# Patient Record
Sex: MALE | Race: WHITE | Employment: OTHER | URBAN - NONMETROPOLITAN AREA
[De-identification: names, ages, dates, MRNs, and addresses within clinical notes are randomized per-mention and may not be internally consistent; named-entity substitution may affect disease eponyms.]

---

## 2021-09-03 ENCOUNTER — APPOINTMENT (OUTPATIENT)
Dept: GENERAL RADIOLOGY | Age: 76
End: 2021-09-03
Payer: MEDICARE

## 2021-09-03 ENCOUNTER — HOSPITAL ENCOUNTER (EMERGENCY)
Age: 76
Discharge: HOME | End: 2021-09-03
Attending: EMERGENCY MEDICINE
Payer: MEDICARE

## 2021-09-03 VITALS
SYSTOLIC BLOOD PRESSURE: 126 MMHG | HEART RATE: 90 BPM | TEMPERATURE: 97.8 F | DIASTOLIC BLOOD PRESSURE: 83 MMHG | HEIGHT: 71 IN | BODY MASS INDEX: 20.66 KG/M2 | OXYGEN SATURATION: 92 % | WEIGHT: 147.6 LBS | RESPIRATION RATE: 18 BRPM
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
## 2021-09-03 DIAGNOSIS — S16.1XXA ACUTE STRAIN OF NECK MUSCLE, INITIAL ENCOUNTER: ICD-10-CM

## 2021-09-03 DIAGNOSIS — S29.019A THORACIC MYOFASCIAL STRAIN, INITIAL ENCOUNTER: ICD-10-CM

## 2021-09-03 PROCEDURE — 72110 X-RAY EXAM L-2 SPINE 4/>VWS: CPT

## 2021-09-03 PROCEDURE — 72072 X-RAY EXAM THORAC SPINE 3VWS: CPT

## 2021-09-03 PROCEDURE — 99283 EMERGENCY DEPT VISIT LOW MDM: CPT

## 2021-09-03 PROCEDURE — 72050 X-RAY EXAM NECK SPINE 4/5VWS: CPT

## 2021-09-03 PROCEDURE — 71045 X-RAY EXAM CHEST 1 VIEW: CPT

## 2021-09-03 RX ORDER — NAPROXEN 375 MG/1
375 TABLET ORAL 2 TIMES DAILY WITH MEALS
Qty: 20 TABLET | Refills: 0 | Status: SHIPPED | OUTPATIENT
Start: 2021-09-03

## 2021-09-03 ASSESSMENT — PAIN DESCRIPTION - PAIN TYPE: TYPE: ACUTE PAIN

## 2021-09-03 ASSESSMENT — PAIN DESCRIPTION - FREQUENCY: FREQUENCY: CONTINUOUS

## 2021-09-03 ASSESSMENT — PAIN DESCRIPTION - LOCATION: LOCATION: BACK;CHEST;NECK

## 2021-09-03 ASSESSMENT — PAIN DESCRIPTION - DESCRIPTORS: DESCRIPTORS: ACHING

## 2021-09-03 ASSESSMENT — PAIN SCALES - GENERAL: PAINLEVEL_OUTOF10: 7
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
## 2022-01-09 ENCOUNTER — APPOINTMENT (OUTPATIENT)
Dept: GENERAL RADIOLOGY | Age: 77
DRG: 190 | End: 2022-01-09
Payer: MEDICARE

## 2022-01-09 ENCOUNTER — HOSPITAL ENCOUNTER (INPATIENT)
Age: 77
LOS: 4 days | Discharge: HOME | DRG: 190 | End: 2022-01-14
Attending: EMERGENCY MEDICINE | Admitting: INTERNAL MEDICINE
Payer: MEDICARE

## 2022-01-09 DIAGNOSIS — R91.8 PULMONARY NODULES: ICD-10-CM

## 2022-01-09 DIAGNOSIS — J44.1 COPD EXACERBATION (HCC): Primary | ICD-10-CM

## 2022-01-09 DIAGNOSIS — R09.02 HYPOXIA: ICD-10-CM

## 2022-01-09 LAB
ABSOLUTE EOS #: 0 K/UL (ref 0–0.4)
ABSOLUTE IMMATURE GRANULOCYTE: ABNORMAL K/UL (ref 0–0.3)
ABSOLUTE LYMPH #: 0.7 K/UL (ref 1–4.8)
ABSOLUTE MONO #: 1.7 K/UL (ref 0–1)
ALBUMIN SERPL-MCNC: 3.9 G/DL (ref 3.5–5.2)
ALBUMIN/GLOBULIN RATIO: ABNORMAL (ref 1–2.5)
ALLEN TEST: ABNORMAL
ALP BLD-CCNC: 106 U/L (ref 40–129)
ALT SERPL-CCNC: 6 U/L (ref 5–41)
ANION GAP SERPL CALCULATED.3IONS-SCNC: 14 MMOL/L (ref 9–17)
AST SERPL-CCNC: 7 U/L
BASOPHILS # BLD: 0 % (ref 0–2)
BASOPHILS ABSOLUTE: 0 K/UL (ref 0–0.2)
BILIRUB SERPL-MCNC: 0.41 MG/DL (ref 0.3–1.2)
BUN BLDV-MCNC: 26 MG/DL (ref 8–23)
BUN/CREAT BLD: ABNORMAL (ref 9–20)
C-REACTIVE PROTEIN: 323.1 MG/L (ref 0–5)
CALCIUM SERPL-MCNC: 9.6 MG/DL (ref 8.6–10.4)
CHLORIDE BLD-SCNC: 101 MMOL/L (ref 98–107)
CO2: 25 MMOL/L (ref 20–31)
CREAT SERPL-MCNC: 0.59 MG/DL (ref 0.7–1.2)
DIFFERENTIAL TYPE: YES
EOSINOPHILS RELATIVE PERCENT: 0 % (ref 0–5)
FERRITIN: 307 UG/L (ref 30–400)
FIO2: 35
FIO2: 35
FIO2: 40
GFR AFRICAN AMERICAN: >60 ML/MIN
GFR NON-AFRICAN AMERICAN: >60 ML/MIN
GFR SERPL CREATININE-BSD FRML MDRD: ABNORMAL ML/MIN/{1.73_M2}
GFR SERPL CREATININE-BSD FRML MDRD: ABNORMAL ML/MIN/{1.73_M2}
GLUCOSE BLD-MCNC: 164 MG/DL (ref 70–99)
HCT VFR BLD CALC: 46.3 % (ref 41–53)
HEMOGLOBIN: 14.9 G/DL (ref 13.5–17.5)
IMMATURE GRANULOCYTES: ABNORMAL %
LACTATE DEHYDROGENASE: 136 U/L (ref 135–225)
LACTIC ACID, SEPSIS WHOLE BLOOD: NORMAL MMOL/L (ref 0.5–1.9)
LACTIC ACID, SEPSIS: 1.1 MMOL/L (ref 0.5–1.9)
LYMPHOCYTES # BLD: 4 % (ref 13–44)
MCH RBC QN AUTO: 26.5 PG (ref 26–34)
MCHC RBC AUTO-ENTMCNC: 32.1 G/DL (ref 31–37)
MCV RBC AUTO: 82.5 FL (ref 80–100)
MODE: ABNORMAL
MONOCYTES # BLD: 11 % (ref 5–9)
NEGATIVE BASE EXCESS, ART: 2 (ref 0–2)
NEGATIVE BASE EXCESS, ART: ABNORMAL (ref 0–2)
NEGATIVE BASE EXCESS, ART: ABNORMAL (ref 0–2)
NRBC AUTOMATED: ABNORMAL PER 100 WBC
O2 DEVICE/FLOW/%: ABNORMAL
PATIENT TEMP: ABNORMAL
PDW BLD-RTO: 15.9 % (ref 12.1–15.2)
PLATELET # BLD: 310 K/UL (ref 140–450)
PLATELET ESTIMATE: ABNORMAL
PMV BLD AUTO: ABNORMAL FL (ref 6–12)
POC HCO3: 28 MMOL/L (ref 21–28)
POC HCO3: 32.1 MMOL/L (ref 21–28)
POC HCO3: 32.6 MMOL/L (ref 21–28)
POC O2 SATURATION: 88 % (ref 94–98)
POC O2 SATURATION: 91 % (ref 94–98)
POC O2 SATURATION: 96 % (ref 94–98)
POC PCO2 TEMP: ABNORMAL MM HG
POC PCO2: 69.2 MM HG (ref 35–48)
POC PCO2: 73.2 MM HG (ref 35–48)
POC PCO2: 85.5 MM HG (ref 35–48)
POC PH TEMP: ABNORMAL
POC PH: 7.18 (ref 7.35–7.45)
POC PH: 7.21 (ref 7.35–7.45)
POC PH: 7.26 (ref 7.35–7.45)
POC PO2 TEMP: ABNORMAL MM HG
POC PO2: 103.3 MM HG (ref 83–108)
POC PO2: 70.4 MM HG (ref 83–108)
POC PO2: 74.3 MM HG (ref 83–108)
POSITIVE BASE EXCESS, ART: 0 (ref 0–3)
POSITIVE BASE EXCESS, ART: 2 (ref 0–3)
POSITIVE BASE EXCESS, ART: ABNORMAL (ref 0–3)
POTASSIUM SERPL-SCNC: 4.2 MMOL/L (ref 3.7–5.3)
RBC # BLD: 5.61 M/UL (ref 4.5–5.9)
RBC # BLD: ABNORMAL 10*6/UL
SAMPLE SITE: ABNORMAL
SARS-COV-2, RAPID: NOT DETECTED
SEG NEUTROPHILS: 85 % (ref 39–75)
SEGMENTED NEUTROPHILS ABSOLUTE COUNT: 13.1 K/UL (ref 2.1–6.5)
SODIUM BLD-SCNC: 140 MMOL/L (ref 135–144)
SPECIMEN DESCRIPTION: NORMAL
TCO2 (CALC), ART: ABNORMAL MMOL/L (ref 22–29)
TOTAL PROTEIN: 8.3 G/DL (ref 6.4–8.3)
TROPONIN INTERP: NORMAL
TROPONIN T: NORMAL NG/ML
TROPONIN, HIGH SENSITIVITY: 20 NG/L (ref 0–22)
WBC # BLD: 15.6 K/UL (ref 3.5–11)
WBC # BLD: ABNORMAL 10*3/UL

## 2022-01-09 PROCEDURE — 82728 ASSAY OF FERRITIN: CPT

## 2022-01-09 PROCEDURE — 2580000003 HC RX 258: Performed by: EMERGENCY MEDICINE

## 2022-01-09 PROCEDURE — 86140 C-REACTIVE PROTEIN: CPT

## 2022-01-09 PROCEDURE — 96367 TX/PROPH/DG ADDL SEQ IV INF: CPT

## 2022-01-09 PROCEDURE — C9803 HOPD COVID-19 SPEC COLLECT: HCPCS

## 2022-01-09 PROCEDURE — 84484 ASSAY OF TROPONIN QUANT: CPT

## 2022-01-09 PROCEDURE — 83605 ASSAY OF LACTIC ACID: CPT

## 2022-01-09 PROCEDURE — 96375 TX/PRO/DX INJ NEW DRUG ADDON: CPT

## 2022-01-09 PROCEDURE — 93005 ELECTROCARDIOGRAM TRACING: CPT | Performed by: EMERGENCY MEDICINE

## 2022-01-09 PROCEDURE — 36415 COLL VENOUS BLD VENIPUNCTURE: CPT

## 2022-01-09 PROCEDURE — 96365 THER/PROPH/DIAG IV INF INIT: CPT

## 2022-01-09 PROCEDURE — 6370000000 HC RX 637 (ALT 250 FOR IP): Performed by: EMERGENCY MEDICINE

## 2022-01-09 PROCEDURE — 83615 LACTATE (LD) (LDH) ENZYME: CPT

## 2022-01-09 PROCEDURE — 85025 COMPLETE CBC W/AUTO DIFF WBC: CPT

## 2022-01-09 PROCEDURE — 36600 WITHDRAWAL OF ARTERIAL BLOOD: CPT

## 2022-01-09 PROCEDURE — 87635 SARS-COV-2 COVID-19 AMP PRB: CPT

## 2022-01-09 PROCEDURE — 94640 AIRWAY INHALATION TREATMENT: CPT

## 2022-01-09 PROCEDURE — 87040 BLOOD CULTURE FOR BACTERIA: CPT

## 2022-01-09 PROCEDURE — 94660 CPAP INITIATION&MGMT: CPT

## 2022-01-09 PROCEDURE — 82803 BLOOD GASES ANY COMBINATION: CPT

## 2022-01-09 PROCEDURE — 71045 X-RAY EXAM CHEST 1 VIEW: CPT

## 2022-01-09 PROCEDURE — 6360000002 HC RX W HCPCS: Performed by: EMERGENCY MEDICINE

## 2022-01-09 PROCEDURE — 80053 COMPREHEN METABOLIC PANEL: CPT

## 2022-01-09 RX ORDER — IPRATROPIUM BROMIDE AND ALBUTEROL SULFATE 2.5; .5 MG/3ML; MG/3ML
1 SOLUTION RESPIRATORY (INHALATION) ONCE
Status: COMPLETED | OUTPATIENT
Start: 2022-01-09 | End: 2022-01-09

## 2022-01-09 RX ORDER — 0.9 % SODIUM CHLORIDE 0.9 %
500 INTRAVENOUS SOLUTION INTRAVENOUS ONCE
Status: COMPLETED | OUTPATIENT
Start: 2022-01-09 | End: 2022-01-09

## 2022-01-09 RX ORDER — METHYLPREDNISOLONE SODIUM SUCCINATE 125 MG/2ML
125 INJECTION, POWDER, LYOPHILIZED, FOR SOLUTION INTRAMUSCULAR; INTRAVENOUS ONCE
Status: COMPLETED | OUTPATIENT
Start: 2022-01-09 | End: 2022-01-09

## 2022-01-09 RX ADMIN — CEFTRIAXONE SODIUM 1000 MG: 1 INJECTION, POWDER, FOR SOLUTION INTRAMUSCULAR; INTRAVENOUS at 14:51

## 2022-01-09 RX ADMIN — IPRATROPIUM BROMIDE AND ALBUTEROL SULFATE 1 AMPULE: .5; 3 SOLUTION RESPIRATORY (INHALATION) at 14:36

## 2022-01-09 RX ADMIN — METHYLPREDNISOLONE SODIUM SUCCINATE 125 MG: 125 INJECTION, POWDER, FOR SOLUTION INTRAMUSCULAR; INTRAVENOUS at 18:41

## 2022-01-09 RX ADMIN — IPRATROPIUM BROMIDE AND ALBUTEROL SULFATE 1 AMPULE: .5; 3 SOLUTION RESPIRATORY (INHALATION) at 19:41

## 2022-01-09 RX ADMIN — AZITHROMYCIN MONOHYDRATE 500 MG: 500 INJECTION, POWDER, LYOPHILIZED, FOR SOLUTION INTRAVENOUS at 15:22

## 2022-01-09 RX ADMIN — SODIUM CHLORIDE 500 ML: 9 INJECTION, SOLUTION INTRAVENOUS at 13:22

## 2022-01-09 ASSESSMENT — ENCOUNTER SYMPTOMS
VOMITING: 0
ABDOMINAL PAIN: 0
NAUSEA: 0
EYE REDNESS: 0
DIARRHEA: 0
SHORTNESS OF BREATH: 1
TROUBLE SWALLOWING: 0
SORE THROAT: 0
BACK PAIN: 0
EYE PAIN: 0
COLOR CHANGE: 0
COUGH: 0

## 2022-01-09 NOTE — ED PROVIDER NOTES
family history. SOCIAL HISTORY       Social History     Socioeconomic History    Marital status:      Spouse name: None    Number of children: None    Years of education: None    Highest education level: None   Occupational History    None   Tobacco Use    Smoking status: Current Every Day Smoker     Packs/day: 1.00    Smokeless tobacco: Never Used   Vaping Use    Vaping Use: Never used   Substance and Sexual Activity    Alcohol use: None    Drug use: Never    Sexual activity: None   Other Topics Concern    None   Social History Narrative    None     Social Determinants of Health     Financial Resource Strain:     Difficulty of Paying Living Expenses: Not on file   Food Insecurity:     Worried About Running Out of Food in the Last Year: Not on file    Lesly of Food in the Last Year: Not on file   Transportation Needs:     Lack of Transportation (Medical): Not on file    Lack of Transportation (Non-Medical):  Not on file   Physical Activity:     Days of Exercise per Week: Not on file    Minutes of Exercise per Session: Not on file   Stress:     Feeling of Stress : Not on file   Social Connections:     Frequency of Communication with Friends and Family: Not on file    Frequency of Social Gatherings with Friends and Family: Not on file    Attends Orthodox Services: Not on file    Active Member of [address] [place] [address] or Organizations: Not on file    Attends Club or Organization Meetings: Not on file    Marital Status: Not on file   Intimate Partner Violence:     Fear of Current or Ex-Partner: Not on file    Emotionally Abused: Not on file    Physically Abused: Not on file    Sexually Abused: Not on file   Housing Stability:     Unable to Pay for Housing in the Last Year: Not on file    Number of [place] in the Last Year: Not on file    Unstable Housing in the Last Year: Not on file           PHYSICAL EXAM    (up to 7 for level 4, 8 ormore for level 5)     ED Triage Vitals   BP Temp Temp Source Pulse Resp SpO2 Height Weight   01/09/22 1306 01/09/22 1307 01/09/22 1307 01/09/22 1306 01/09/22 1307 01/09/22 1305 -- --   (!) 148/72 98.7 °F (37.1 °C) Oral 110 28 (!) 61 %         Physical Exam     Physical    Vital signs and nursing notes were reviewed as well as the social, family, and past medical history. Gen. appearance: Patient is alert and oriented and in no acute distress    Head: Atraumatic, normocephalic    Neck: Supple, trachea/thyroid normal    EENT: PERRLA, EOMI, conjunctiva normal.    Skin: Warm and dry with no rash    Cardiovascular: Heart RRR, no gallops or rubs, no aortic enlargement or bruits noted. Respiratory: Lungs diminished, minimal wheezing, no rales,     Gastrointestinal: Abdomen nontender, bowel sounds normal, no rebound/guarding/distention or mass    Musculoskeletal: No tenderness in the extremities, no back or hip pain. Neurological: Patient is alert and oriented ×3, no focal motor or sensory deficits noted, reflexes normal, NIH = 0      DIAGNOSTIC RESULTS     EKG: All EKG's are interpreted by the Emergency Department Physicianwho either signs or Co-signs this chart in the absence of a cardiologist.    EKG shows a heart rate of 114 with normal sinus rhythm and no acute ischemic change.     RADIOLOGY:         LABS:  Labs Reviewed   CBC WITH AUTO DIFFERENTIAL - Abnormal; Notable for the following components:       Result Value    WBC 15.6 (*)     RDW 15.9 (*)     Seg Neutrophils 85 (*)     Lymphocytes 4 (*)     Monocytes 11 (*)     Segs Absolute 13.10 (*)     Absolute Lymph # 0.70 (*)     Absolute Mono # 1.70 (*)     All other components within normal limits   COMPREHENSIVE METABOLIC PANEL - Abnormal; Notable for the following components:    Glucose 164 (*)     BUN 26 (*)     CREATININE 0.59 (*)     All other components within normal limits   C-REACTIVE PROTEIN - Abnormal; Notable for the following components:    .1 (*)     All other components within normal limits   ARTERIAL BLOOD GAS, POC - Abnormal; Notable for the following components:    POC pH 7.215 (*)     POC pCO2 69.2 (*)     All other components within normal limits   ARTERIAL BLOOD GAS, POC - Abnormal; Notable for the following components:    POC pH 7.182 (*)     POC pCO2 85.5 (*)     POC PO2 70.4 (*)     POC HCO3 32.1 (*)     POC O2 SAT 88 (*)     All other components within normal limits   ARTERIAL BLOOD GAS, POC - Abnormal; Notable for the following components:    POC pH 7.257 (*)     POC pCO2 73.2 (*)     POC PO2 74.3 (*)     POC HCO3 32.6 (*)     POC O2 SAT 91 (*)     All other components within normal limits   ARTERIAL BLOOD GAS, POC - Abnormal; Notable for the following components:    POC pH 7.304 (*)     POC pCO2 61.6 (*)     POC PO2 67.3 (*)     POC HCO3 30.6 (*)     POC O2 SAT 90 (*)     All other components within normal limits   ARTERIAL BLOOD GAS, POC - Abnormal; Notable for the following components:    POC pH 7.310 (*)     POC pCO2 62.8 (*)     POC PO2 62.8 (*)     POC HCO3 31.6 (*)     POC O2 SAT 89 (*)     All other components within normal limits   COVID-19, RAPID   CULTURE, BLOOD 1   CULTURE, BLOOD 1   TROPONIN   LACTATE DEHYDROGENASE   FERRITIN   LACTATE, SEPSIS       All other labs were within normal range or not returned as of this dictation. EMERGENCY DEPARTMENT COURSE and DIFFERENTIAL DIAGNOSIS/MDM:   Vitals:    Vitals:    01/10/22 0530 01/10/22 0600 01/10/22 0630 01/10/22 0701   BP:  120/69  127/75   Pulse: 75 70 73 71   Resp: 18 16 17 15   Temp:       TempSrc:       SpO2: 92%  94% 93%   Weight:       Height:                     REASSESSMENT      Here in the ED given patient's initial O2 of 69 patient was placed on BiPAP. After an hour and a half a repeat blood gas was obtained and patient's pH is actually more acidic at 7.182 and a PCO2 of 85 is noted. At this time patient's settings were adjusted and we will check again in 30 minutes for improvement versus deterioration. Patient is still awake and breathing comfortably with the BiPAP. Patient's pH and PCO2 improved with adjustments and patient seems to be doing much better. Patient was given IV steroids and has been given multiple nebulized treatments and is tolerating the BiPAP mask. At this point there is no bed for admission. I did call [facility] and spoke to their intensivist and patient was accepted in transfer however there are no beds and patient is on the wait list.  Patient will be continued to be managed in the ED and reevaluated. Patient will be signed out to the oncoming provider at 7 AM.                  Due to the immediate potential for life-threatening deterioration due to hypoxia and respiratory distress secondary to COPD during which time patient was placed on BiPAP and given albuterol treatments and steroids, I spent 80 minutes providing critical care. This time is excluding time spent performing procedures. PROCEDURES:  Unless otherwise noted below, none     Procedures    FINAL IMPRESSION      1. COPD exacerbation ([address]    2. Hypoxia          DISPOSITION/PLAN   DISPOSITION        PATIENT REFERRED TO:  No follow-up provider specified.     DISCHARGE MEDICATIONS:  New Prescriptions    No medications on file          (Please note that portions ofthis note were completed with a voice recognition program.  Efforts were made to edit the dictations but occasionally words are mis-transcribed.)    Jaimee Reaves MD(electronically signed)  Attending Emergency Physician            Jaimee Reaves MD  01/10/22 0394

## 2022-01-09 NOTE — CARE COORDINATION
Call received from transfer center for admission to [facility] for evaluation and treatment of acute hypercapnic/hypoxic respiratory failure. Pt has history of COPD  and is now requiring Bipap. AB.//32. Plan:     1. Restart appropriate home medications  2. Oxygen supplementation with goal SPO2 88-92%  3. Continue Solu-medrol 40 Q8  4. Continue Duo-neb's, start guaifenesin, acapella. 5. Continue Rocephin/Azithromycin  6. Continue BiPAP  7.  Plan for admission to Step-down when pt arrives

## 2022-01-10 ENCOUNTER — APPOINTMENT (OUTPATIENT)
Dept: CT IMAGING | Age: 77
DRG: 190 | End: 2022-01-10
Payer: MEDICARE

## 2022-01-10 LAB
ALLEN TEST: POSITIVE
ALLEN TEST: POSITIVE
EKG ATRIAL RATE: 114 BPM
EKG P AXIS: 73 DEGREES
EKG P-R INTERVAL: 168 MS
EKG Q-T INTERVAL: 338 MS
EKG QRS DURATION: 108 MS
EKG QTC CALCULATION (BAZETT): 465 MS
EKG R AXIS: 97 DEGREES
EKG T AXIS: 59 DEGREES
EKG VENTRICULAR RATE: 114 BPM
FIO2: 34
FIO2: 34
GLUCOSE BLD-MCNC: 217 MG/DL (ref 65–99)
GLUCOSE BLD-MCNC: 217 MG/DL (ref 65–99)
MODE: ABNORMAL
MODE: ABNORMAL
NEGATIVE BASE EXCESS, ART: ABNORMAL (ref 0–2)
NEGATIVE BASE EXCESS, ART: ABNORMAL (ref 0–2)
O2 DEVICE/FLOW/%: ABNORMAL
O2 DEVICE/FLOW/%: ABNORMAL
PATIENT TEMP: ABNORMAL
PATIENT TEMP: ABNORMAL
POC HCO3: 30.6 MMOL/L (ref 21–28)
POC HCO3: 31.6 MMOL/L (ref 21–28)
POC O2 SATURATION: 89 % (ref 94–98)
POC O2 SATURATION: 90 % (ref 94–98)
POC PCO2 TEMP: ABNORMAL MM HG
POC PCO2 TEMP: ABNORMAL MM HG
POC PCO2: 61.6 MM HG (ref 35–48)
POC PCO2: 62.8 MM HG (ref 35–48)
POC PH TEMP: ABNORMAL
POC PH TEMP: ABNORMAL
POC PH: 7.3 (ref 7.35–7.45)
POC PH: 7.31 (ref 7.35–7.45)
POC PO2 TEMP: ABNORMAL MM HG
POC PO2 TEMP: ABNORMAL MM HG
POC PO2: 62.8 MM HG (ref 83–108)
POC PO2: 67.3 MM HG (ref 83–108)
POSITIVE BASE EXCESS, ART: 2 (ref 0–3)
POSITIVE BASE EXCESS, ART: 3 (ref 0–3)
SAMPLE SITE: ABNORMAL
SAMPLE SITE: ABNORMAL
TCO2 (CALC), ART: ABNORMAL MMOL/L (ref 22–29)
TCO2 (CALC), ART: ABNORMAL MMOL/L (ref 22–29)

## 2022-01-10 PROCEDURE — 6370000000 HC RX 637 (ALT 250 FOR IP): Performed by: INTERNAL MEDICINE

## 2022-01-10 PROCEDURE — 2580000003 HC RX 258: Performed by: INTERNAL MEDICINE

## 2022-01-10 PROCEDURE — 96375 TX/PRO/DX INJ NEW DRUG ADDON: CPT

## 2022-01-10 PROCEDURE — 94640 AIRWAY INHALATION TREATMENT: CPT

## 2022-01-10 PROCEDURE — 6360000002 HC RX W HCPCS: Performed by: INTERNAL MEDICINE

## 2022-01-10 PROCEDURE — 2700000000 HC OXYGEN THERAPY PER DAY

## 2022-01-10 PROCEDURE — 83036 HEMOGLOBIN GLYCOSYLATED A1C: CPT

## 2022-01-10 PROCEDURE — 6370000000 HC RX 637 (ALT 250 FOR IP): Performed by: EMERGENCY MEDICINE

## 2022-01-10 PROCEDURE — 94761 N-INVAS EAR/PLS OXIMETRY MLT: CPT

## 2022-01-10 PROCEDURE — 82947 ASSAY GLUCOSE BLOOD QUANT: CPT

## 2022-01-10 PROCEDURE — 82803 BLOOD GASES ANY COMBINATION: CPT

## 2022-01-10 PROCEDURE — 99285 EMERGENCY DEPT VISIT HI MDM: CPT

## 2022-01-10 PROCEDURE — 93010 ELECTROCARDIOGRAM REPORT: CPT | Performed by: INTERNAL MEDICINE

## 2022-01-10 PROCEDURE — 6360000002 HC RX W HCPCS: Performed by: EMERGENCY MEDICINE

## 2022-01-10 PROCEDURE — 1200000000 HC SEMI PRIVATE

## 2022-01-10 PROCEDURE — 71250 CT THORAX DX C-: CPT

## 2022-01-10 PROCEDURE — 94660 CPAP INITIATION&MGMT: CPT

## 2022-01-10 PROCEDURE — 36600 WITHDRAWAL OF ARTERIAL BLOOD: CPT

## 2022-01-10 PROCEDURE — 94669 MECHANICAL CHEST WALL OSCILL: CPT

## 2022-01-10 RX ORDER — IPRATROPIUM BROMIDE AND ALBUTEROL SULFATE 2.5; .5 MG/3ML; MG/3ML
1 SOLUTION RESPIRATORY (INHALATION) ONCE
Status: COMPLETED | OUTPATIENT
Start: 2022-01-10 | End: 2022-01-10

## 2022-01-10 RX ORDER — ALBUTEROL SULFATE 2.5 MG/3ML
2.5 SOLUTION RESPIRATORY (INHALATION)
Status: DISCONTINUED | OUTPATIENT
Start: 2022-01-10 | End: 2022-01-14 | Stop reason: HOSPADM

## 2022-01-10 RX ORDER — LORAZEPAM 2 MG/ML
0.5 INJECTION INTRAMUSCULAR ONCE
Status: COMPLETED | OUTPATIENT
Start: 2022-01-10 | End: 2022-01-10

## 2022-01-10 RX ORDER — ONDANSETRON 2 MG/ML
4 INJECTION INTRAMUSCULAR; INTRAVENOUS EVERY 6 HOURS PRN
Status: DISCONTINUED | OUTPATIENT
Start: 2022-01-10 | End: 2022-01-14 | Stop reason: HOSPADM

## 2022-01-10 RX ORDER — ONDANSETRON 4 MG/1
4 TABLET, ORALLY DISINTEGRATING ORAL EVERY 8 HOURS PRN
Status: DISCONTINUED | OUTPATIENT
Start: 2022-01-10 | End: 2022-01-14 | Stop reason: HOSPADM

## 2022-01-10 RX ORDER — NICOTINE POLACRILEX 4 MG
15 LOZENGE BUCCAL PRN
Status: DISCONTINUED | OUTPATIENT
Start: 2022-01-10 | End: 2022-01-14 | Stop reason: HOSPADM

## 2022-01-10 RX ORDER — GUAIFENESIN 600 MG/1
600 TABLET, EXTENDED RELEASE ORAL 2 TIMES DAILY
Status: DISCONTINUED | OUTPATIENT
Start: 2022-01-10 | End: 2022-01-14 | Stop reason: HOSPADM

## 2022-01-10 RX ORDER — IPRATROPIUM BROMIDE AND ALBUTEROL SULFATE 2.5; .5 MG/3ML; MG/3ML
1 SOLUTION RESPIRATORY (INHALATION)
Status: DISCONTINUED | OUTPATIENT
Start: 2022-01-10 | End: 2022-01-13

## 2022-01-10 RX ORDER — ACETAMINOPHEN 650 MG/1
650 SUPPOSITORY RECTAL EVERY 6 HOURS PRN
Status: DISCONTINUED | OUTPATIENT
Start: 2022-01-10 | End: 2022-01-14 | Stop reason: HOSPADM

## 2022-01-10 RX ORDER — HYDROCODONE BITARTRATE AND ACETAMINOPHEN 5; 325 MG/1; MG/1
1 TABLET ORAL ONCE
Status: COMPLETED | OUTPATIENT
Start: 2022-01-10 | End: 2022-01-10

## 2022-01-10 RX ORDER — DEXTROSE MONOHYDRATE 50 MG/ML
100 INJECTION, SOLUTION INTRAVENOUS PRN
Status: DISCONTINUED | OUTPATIENT
Start: 2022-01-10 | End: 2022-01-14 | Stop reason: HOSPADM

## 2022-01-10 RX ORDER — ACETAMINOPHEN 325 MG/1
650 TABLET ORAL EVERY 6 HOURS PRN
Status: DISCONTINUED | OUTPATIENT
Start: 2022-01-10 | End: 2022-01-14 | Stop reason: HOSPADM

## 2022-01-10 RX ORDER — PREDNISONE 20 MG/1
40 TABLET ORAL DAILY
Status: DISCONTINUED | OUTPATIENT
Start: 2022-01-13 | End: 2022-01-14 | Stop reason: HOSPADM

## 2022-01-10 RX ORDER — DEXTROSE MONOHYDRATE 25 G/50ML
12.5 INJECTION, SOLUTION INTRAVENOUS PRN
Status: DISCONTINUED | OUTPATIENT
Start: 2022-01-10 | End: 2022-01-14 | Stop reason: HOSPADM

## 2022-01-10 RX ORDER — LACTOBACILLUS RHAMNOSUS GG 10B CELL
1 CAPSULE ORAL 2 TIMES DAILY WITH MEALS
Status: DISCONTINUED | OUTPATIENT
Start: 2022-01-10 | End: 2022-01-14 | Stop reason: HOSPADM

## 2022-01-10 RX ORDER — BUDESONIDE AND FORMOTEROL FUMARATE DIHYDRATE 160; 4.5 UG/1; UG/1
2 AEROSOL RESPIRATORY (INHALATION) 2 TIMES DAILY
Status: DISCONTINUED | OUTPATIENT
Start: 2022-01-10 | End: 2022-01-14 | Stop reason: HOSPADM

## 2022-01-10 RX ORDER — SODIUM CHLORIDE 9 MG/ML
25 INJECTION, SOLUTION INTRAVENOUS PRN
Status: DISCONTINUED | OUTPATIENT
Start: 2022-01-10 | End: 2022-01-14 | Stop reason: HOSPADM

## 2022-01-10 RX ORDER — SODIUM CHLORIDE 0.9 % (FLUSH) 0.9 %
5-40 SYRINGE (ML) INJECTION EVERY 12 HOURS SCHEDULED
Status: DISCONTINUED | OUTPATIENT
Start: 2022-01-10 | End: 2022-01-14 | Stop reason: HOSPADM

## 2022-01-10 RX ORDER — SODIUM CHLORIDE 0.9 % (FLUSH) 0.9 %
5-40 SYRINGE (ML) INJECTION PRN
Status: DISCONTINUED | OUTPATIENT
Start: 2022-01-10 | End: 2022-01-14 | Stop reason: HOSPADM

## 2022-01-10 RX ORDER — POLYETHYLENE GLYCOL 3350 17 G/17G
17 POWDER, FOR SOLUTION ORAL DAILY PRN
Status: DISCONTINUED | OUTPATIENT
Start: 2022-01-10 | End: 2022-01-14 | Stop reason: HOSPADM

## 2022-01-10 RX ORDER — METHYLPREDNISOLONE SODIUM SUCCINATE 40 MG/ML
40 INJECTION, POWDER, LYOPHILIZED, FOR SOLUTION INTRAMUSCULAR; INTRAVENOUS EVERY 8 HOURS
Status: COMPLETED | OUTPATIENT
Start: 2022-01-10 | End: 2022-01-12

## 2022-01-10 RX ADMIN — METHYLPREDNISOLONE SODIUM SUCCINATE 40 MG: 40 INJECTION, POWDER, FOR SOLUTION INTRAMUSCULAR; INTRAVENOUS at 18:14

## 2022-01-10 RX ADMIN — GUAIFENESIN 600 MG: 600 TABLET, EXTENDED RELEASE ORAL at 21:43

## 2022-01-10 RX ADMIN — IPRATROPIUM BROMIDE AND ALBUTEROL SULFATE 1 AMPULE: .5; 3 SOLUTION RESPIRATORY (INHALATION) at 19:48

## 2022-01-10 RX ADMIN — LORAZEPAM 0.5 MG: 2 INJECTION, SOLUTION INTRAMUSCULAR; INTRAVENOUS at 01:30

## 2022-01-10 RX ADMIN — CEFTRIAXONE SODIUM 1000 MG: 1 INJECTION, POWDER, FOR SOLUTION INTRAMUSCULAR; INTRAVENOUS at 18:14

## 2022-01-10 RX ADMIN — SODIUM CHLORIDE, PRESERVATIVE FREE 10 ML: 5 INJECTION INTRAVENOUS at 21:46

## 2022-01-10 RX ADMIN — Medication 1 CAPSULE: at 18:19

## 2022-01-10 RX ADMIN — INSULIN LISPRO 1 UNITS: 100 INJECTION, SOLUTION INTRAVENOUS; SUBCUTANEOUS at 21:43

## 2022-01-10 RX ADMIN — HYDROCODONE BITARTRATE AND ACETAMINOPHEN 1 TABLET: 5; 325 TABLET ORAL at 11:53

## 2022-01-10 RX ADMIN — ENOXAPARIN SODIUM 40 MG: 100 INJECTION SUBCUTANEOUS at 18:19

## 2022-01-10 RX ADMIN — BUDESONIDE AND FORMOTEROL FUMARATE DIHYDRATE 2 PUFF: 160; 4.5 AEROSOL RESPIRATORY (INHALATION) at 19:48

## 2022-01-10 RX ADMIN — AZITHROMYCIN MONOHYDRATE 500 MG: 500 INJECTION, POWDER, LYOPHILIZED, FOR SOLUTION INTRAVENOUS at 18:42

## 2022-01-10 RX ADMIN — IPRATROPIUM BROMIDE AND ALBUTEROL SULFATE 1 AMPULE: .5; 3 SOLUTION RESPIRATORY (INHALATION) at 01:42

## 2022-01-10 ASSESSMENT — PAIN SCALES - GENERAL
PAINLEVEL_OUTOF10: 0
PAINLEVEL_OUTOF10: 0
PAINLEVEL_OUTOF10: 7

## 2022-01-11 LAB
ABSOLUTE EOS #: ABNORMAL K/UL (ref 0–0.4)
ABSOLUTE IMMATURE GRANULOCYTE: ABNORMAL K/UL (ref 0–0.3)
ABSOLUTE LYMPH #: 0.6 K/UL (ref 1–4.8)
ABSOLUTE MONO #: 0.52 K/UL (ref 0–1)
ANION GAP SERPL CALCULATED.3IONS-SCNC: 9 MMOL/L (ref 9–17)
BASOPHILS # BLD: ABNORMAL % (ref 0–2)
BASOPHILS ABSOLUTE: ABNORMAL K/UL (ref 0–0.2)
BUN BLDV-MCNC: 35 MG/DL (ref 8–23)
BUN/CREAT BLD: 85 (ref 9–20)
CALCIUM SERPL-MCNC: 9.3 MG/DL (ref 8.6–10.4)
CHLORIDE BLD-SCNC: 105 MMOL/L (ref 98–107)
CO2: 28 MMOL/L (ref 20–31)
CREAT SERPL-MCNC: 0.41 MG/DL (ref 0.7–1.2)
DIFFERENTIAL TYPE: ABNORMAL
EOSINOPHILS RELATIVE PERCENT: ABNORMAL % (ref 0–5)
ESTIMATED AVERAGE GLUCOSE: 108 MG/DL
GFR AFRICAN AMERICAN: >60 ML/MIN
GFR NON-AFRICAN AMERICAN: >60 ML/MIN
GFR SERPL CREATININE-BSD FRML MDRD: ABNORMAL ML/MIN/{1.73_M2}
GFR SERPL CREATININE-BSD FRML MDRD: ABNORMAL ML/MIN/{1.73_M2}
GLUCOSE BLD-MCNC: 216 MG/DL (ref 70–99)
GLUCOSE BLD-MCNC: 223 MG/DL (ref 65–99)
GLUCOSE BLD-MCNC: 262 MG/DL (ref 65–99)
GLUCOSE BLD-MCNC: 264 MG/DL (ref 65–99)
GLUCOSE BLD-MCNC: 297 MG/DL (ref 65–99)
HBA1C MFR BLD: 5.4 % (ref 4–6)
HCT VFR BLD CALC: 41.8 % (ref 41–53)
HEMOGLOBIN: 13.5 G/DL (ref 13.5–17.5)
IMMATURE GRANULOCYTES: ABNORMAL %
LYMPHOCYTES # BLD: 7 % (ref 13–44)
MCH RBC QN AUTO: 26.8 PG (ref 26–34)
MCHC RBC AUTO-ENTMCNC: 32.3 G/DL (ref 31–37)
MCV RBC AUTO: 82.8 FL (ref 80–100)
MONOCYTES # BLD: 6 % (ref 5–9)
MORPHOLOGY: ABNORMAL
NRBC AUTOMATED: ABNORMAL PER 100 WBC
PDW BLD-RTO: 16.2 % (ref 12.1–15.2)
PLATELET # BLD: 327 K/UL (ref 140–450)
PLATELET ESTIMATE: ABNORMAL
PMV BLD AUTO: ABNORMAL FL (ref 6–12)
POTASSIUM SERPL-SCNC: 5 MMOL/L (ref 3.7–5.3)
RBC # BLD: 5.05 M/UL (ref 4.5–5.9)
RBC # BLD: ABNORMAL 10*6/UL
SEG NEUTROPHILS: 87 % (ref 39–75)
SEGMENTED NEUTROPHILS ABSOLUTE COUNT: 7.48 K/UL (ref 2.1–6.5)
SODIUM BLD-SCNC: 142 MMOL/L (ref 135–144)
WBC # BLD: 8.6 K/UL (ref 3.5–11)
WBC # BLD: ABNORMAL 10*3/UL

## 2022-01-11 PROCEDURE — 6360000002 HC RX W HCPCS: Performed by: INTERNAL MEDICINE

## 2022-01-11 PROCEDURE — 82947 ASSAY GLUCOSE BLOOD QUANT: CPT

## 2022-01-11 PROCEDURE — 94660 CPAP INITIATION&MGMT: CPT

## 2022-01-11 PROCEDURE — 80048 BASIC METABOLIC PNL TOTAL CA: CPT

## 2022-01-11 PROCEDURE — 94640 AIRWAY INHALATION TREATMENT: CPT

## 2022-01-11 PROCEDURE — 36415 COLL VENOUS BLD VENIPUNCTURE: CPT

## 2022-01-11 PROCEDURE — 85025 COMPLETE CBC W/AUTO DIFF WBC: CPT

## 2022-01-11 PROCEDURE — 2700000000 HC OXYGEN THERAPY PER DAY

## 2022-01-11 PROCEDURE — 6370000000 HC RX 637 (ALT 250 FOR IP): Performed by: INTERNAL MEDICINE

## 2022-01-11 PROCEDURE — 2580000003 HC RX 258: Performed by: INTERNAL MEDICINE

## 2022-01-11 PROCEDURE — 94669 MECHANICAL CHEST WALL OSCILL: CPT

## 2022-01-11 PROCEDURE — 1200000000 HC SEMI PRIVATE

## 2022-01-11 PROCEDURE — 94761 N-INVAS EAR/PLS OXIMETRY MLT: CPT

## 2022-01-11 RX ORDER — NICOTINE 21 MG/24HR
1 PATCH, TRANSDERMAL 24 HOURS TRANSDERMAL DAILY
Status: DISCONTINUED | OUTPATIENT
Start: 2022-01-11 | End: 2022-01-14 | Stop reason: HOSPADM

## 2022-01-11 RX ORDER — PANTOPRAZOLE SODIUM 40 MG/1
40 TABLET, DELAYED RELEASE ORAL
Status: DISCONTINUED | OUTPATIENT
Start: 2022-01-11 | End: 2022-01-14 | Stop reason: HOSPADM

## 2022-01-11 RX ADMIN — METFORMIN HYDROCHLORIDE 500 MG: 500 TABLET ORAL at 08:37

## 2022-01-11 RX ADMIN — INSULIN LISPRO 2 UNITS: 100 INJECTION, SOLUTION INTRAVENOUS; SUBCUTANEOUS at 21:41

## 2022-01-11 RX ADMIN — IPRATROPIUM BROMIDE AND ALBUTEROL SULFATE 1 AMPULE: .5; 3 SOLUTION RESPIRATORY (INHALATION) at 13:39

## 2022-01-11 RX ADMIN — IPRATROPIUM BROMIDE AND ALBUTEROL SULFATE 1 AMPULE: .5; 3 SOLUTION RESPIRATORY (INHALATION) at 09:36

## 2022-01-11 RX ADMIN — METHYLPREDNISOLONE SODIUM SUCCINATE 40 MG: 40 INJECTION, POWDER, FOR SOLUTION INTRAMUSCULAR; INTRAVENOUS at 00:54

## 2022-01-11 RX ADMIN — ENOXAPARIN SODIUM 40 MG: 100 INJECTION SUBCUTANEOUS at 08:37

## 2022-01-11 RX ADMIN — GUAIFENESIN 600 MG: 600 TABLET, EXTENDED RELEASE ORAL at 08:37

## 2022-01-11 RX ADMIN — IPRATROPIUM BROMIDE AND ALBUTEROL SULFATE 1 AMPULE: .5; 3 SOLUTION RESPIRATORY (INHALATION) at 17:46

## 2022-01-11 RX ADMIN — IPRATROPIUM BROMIDE AND ALBUTEROL SULFATE 1 AMPULE: .5; 3 SOLUTION RESPIRATORY (INHALATION) at 22:08

## 2022-01-11 RX ADMIN — SODIUM CHLORIDE, PRESERVATIVE FREE 10 ML: 5 INJECTION INTRAVENOUS at 08:39

## 2022-01-11 RX ADMIN — SODIUM CHLORIDE, PRESERVATIVE FREE 10 ML: 5 INJECTION INTRAVENOUS at 21:00

## 2022-01-11 RX ADMIN — INSULIN LISPRO 2 UNITS: 100 INJECTION, SOLUTION INTRAVENOUS; SUBCUTANEOUS at 17:45

## 2022-01-11 RX ADMIN — INSULIN LISPRO 3 UNITS: 100 INJECTION, SOLUTION INTRAVENOUS; SUBCUTANEOUS at 08:35

## 2022-01-11 RX ADMIN — BUDESONIDE AND FORMOTEROL FUMARATE DIHYDRATE 2 PUFF: 160; 4.5 AEROSOL RESPIRATORY (INHALATION) at 22:08

## 2022-01-11 RX ADMIN — CEFTRIAXONE SODIUM 1000 MG: 1 INJECTION, POWDER, FOR SOLUTION INTRAMUSCULAR; INTRAVENOUS at 17:47

## 2022-01-11 RX ADMIN — Medication 1 CAPSULE: at 17:48

## 2022-01-11 RX ADMIN — BUDESONIDE AND FORMOTEROL FUMARATE DIHYDRATE 2 PUFF: 160; 4.5 AEROSOL RESPIRATORY (INHALATION) at 09:38

## 2022-01-11 RX ADMIN — PANTOPRAZOLE SODIUM 40 MG: 40 TABLET, DELAYED RELEASE ORAL at 06:11

## 2022-01-11 RX ADMIN — IPRATROPIUM BROMIDE AND ALBUTEROL SULFATE 1 AMPULE: .5; 3 SOLUTION RESPIRATORY (INHALATION) at 05:27

## 2022-01-11 RX ADMIN — METHYLPREDNISOLONE SODIUM SUCCINATE 40 MG: 40 INJECTION, POWDER, FOR SOLUTION INTRAMUSCULAR; INTRAVENOUS at 17:48

## 2022-01-11 RX ADMIN — AZITHROMYCIN MONOHYDRATE 500 MG: 500 INJECTION, POWDER, LYOPHILIZED, FOR SOLUTION INTRAVENOUS at 18:26

## 2022-01-11 RX ADMIN — ACETAMINOPHEN 650 MG: 325 TABLET, FILM COATED ORAL at 08:37

## 2022-01-11 RX ADMIN — INSULIN LISPRO 3 UNITS: 100 INJECTION, SOLUTION INTRAVENOUS; SUBCUTANEOUS at 11:27

## 2022-01-11 RX ADMIN — Medication 1 CAPSULE: at 08:37

## 2022-01-11 RX ADMIN — METHYLPREDNISOLONE SODIUM SUCCINATE 40 MG: 40 INJECTION, POWDER, FOR SOLUTION INTRAMUSCULAR; INTRAVENOUS at 08:37

## 2022-01-11 RX ADMIN — GUAIFENESIN 600 MG: 600 TABLET, EXTENDED RELEASE ORAL at 21:42

## 2022-01-11 ASSESSMENT — PAIN SCALES - GENERAL
PAINLEVEL_OUTOF10: 7
PAINLEVEL_OUTOF10: 0
PAINLEVEL_OUTOF10: 7
PAINLEVEL_OUTOF10: 0
PAINLEVEL_OUTOF10: 7
PAINLEVEL_OUTOF10: 0
PAINLEVEL_OUTOF10: 6
PAINLEVEL_OUTOF10: 4

## 2022-01-11 ASSESSMENT — PAIN DESCRIPTION - LOCATION: LOCATION: GENERALIZED

## 2022-01-11 NOTE — PROGRESS NOTES
Room smells overwhelmingly of smoke upon entering the room; pt refuses to allow nursing to put his jacket in the closet and states he isn't smoking in the room. Dr. Serena Alba made aware and in to talk with pt; pt gives his cigarettes to Dr. Serena Alba and is educated on no smoking in the hospital. Cigarettes given to .

## 2022-01-11 NOTE — PLAN OF CARE
Problem: Skin Integrity:  Goal: Will show no infection signs and symptoms  Description: Will show no infection signs and symptoms  1/10/2022 2343 by Abhishek Fair RN  Outcome: Ongoing  1/10/2022 1920 by Paige Perry RN  Outcome: Ongoing  Goal: Absence of new skin breakdown  Description: Absence of new skin breakdown  1/10/2022 2343 by Abhishek Fair RN  Outcome: Ongoing  1/10/2022 1920 by Paige Perry RN  Outcome: Ongoing     Problem: SAFETY  Goal: Free from accidental physical injury  1/10/2022 2343 by Abhishek Fair RN  Outcome: Ongoing  1/10/2022 1920 by Paige Perry RN  Outcome: Ongoing  Goal: Free from intentional harm  1/10/2022 1920 by Paige Perry RN  Outcome: Ongoing     Problem: DAILY CARE  Goal: Daily care needs are met  1/10/2022 2343 by Abhishek Fair RN  Outcome: Ongoing  1/10/2022 1920 by Paige Perry RN  Outcome: Ongoing     Problem: PAIN  Goal: Patient's pain/discomfort is manageable  1/10/2022 1920 by Paige Perry RN  Outcome: Ongoing     Problem: SKIN INTEGRITY  Goal: Skin integrity is maintained or improved  1/10/2022 2343 by Abhishek Fair RN  Outcome: Ongoing  1/10/2022 1920 by Paige Perry RN  Outcome: Ongoing     Problem: KNOWLEDGE DEFICIT  Goal: Patient/S.O. demonstrates understanding of disease process, treatment plan, medications, and discharge instructions.   1/10/2022 2343 by Abhishek Fair RN  Outcome: Ongoing  1/10/2022 1920 by Paige Perry RN  Outcome: Ongoing     Problem: DISCHARGE BARRIERS  Goal: Patient's continuum of care needs are met  1/10/2022 1920 by Paige Perry RN  Outcome: Ongoing

## 2022-01-11 NOTE — PROGRESS NOTES
SW met with pt to complete assessment with RN case manager during quality rounds. Pt is alert and oriented and cooperative with assessment. Pt is a 78year old male admitted for COPD exacerbation. Pt reports that he lives alone in his home in [place] [place] but states that his grandson has been staying with him. Pt reports that his grandkids are close by and are willing to assist him if needed. Pt reports that he is not using any DME. Pt attends the [facility] in [place] and is eligible for additional services through them as needed. Pt reports that he drives and is able to get to appointments. Pt is a full code and follows with Dr Aguilar Ocasio as PCP. Pt also sees the [place] Dr in the clinic in [place]. Pt does not have advance directives and is uncertain about who he would name to make medical decisions for him. Pt talks for awhile about his sister who has had many medical complications over the years and knows healthcare pretty well but he doesn't think he would want her to make his decisions. ACP note completed with information provided. Pt reports that his medications are completely covered at the [facility]. Pt plans to return home at discharge. Pt identifies no discharge needs or concerns at this time. SW will follow and remain available as needed.  Ashley Moreno MSW LSW 1/11/2022

## 2022-01-11 NOTE — PLAN OF CARE
Problem: Skin Integrity:  Goal: Will show no infection signs and symptoms  Description: Will show no infection signs and symptoms  Outcome: Ongoing  Goal: Absence of new skin breakdown  Description: Absence of new skin breakdown  Outcome: Ongoing     Problem: SAFETY  Goal: Free from accidental physical injury  Outcome: Ongoing  Goal: Free from intentional harm  Outcome: Ongoing     Problem: DAILY CARE  Goal: Daily care needs are met  Outcome: Ongoing     Problem: PAIN  Goal: Patient's pain/discomfort is manageable  Outcome: Ongoing     Problem: SKIN INTEGRITY  Goal: Skin integrity is maintained or improved  Outcome: Ongoing     Problem: KNOWLEDGE DEFICIT  Goal: Patient/S.O. demonstrates understanding of disease process, treatment plan, medications, and discharge instructions.   Outcome: Ongoing     Problem: DISCHARGE BARRIERS  Goal: Patient's continuum of care needs are met  Outcome: Ongoing

## 2022-01-11 NOTE — H&P
History & Physical    Patient:  Rosie Isaacs  YOB: 1945  Date of Service: 1/11/2022  MRN: [id number]   Acct:   [de-identified]   Primary Care Physician: No primary care provider on file. Chief Complaint:   Chief Complaint   Patient presents with    Shortness of Breath     pt reports shortness of breath since yesterday. History of Present Illness: The patient is a 78 y.o. male presented to the emergency room complaining of severe shortness of breath, fever, myalgias and generalized weakness. Patient reported that his symptoms started today before and progressively became worse. He had no associated chest pain, no abdominal pain. He had associated cough. Patient is a smoker and has a history of COPD, not on oxygen at home. Patient's oxygen saturations was in the 70s in ER. He was placed on BiPAP for respiratory support. Work-up in the emergency room revealed temperature of 98.7, heart rate 110, respirations 28, blood pressure 148/72. Initial ABGs revealed pH of 7.25/73.2/74.3/32. 6. After BiPAP treatment repeat ABGs revealed pH 7.18, PCO2 85.5, PO2 70.4, bicarb 32.1. BiPAP settings were adjusted and subsequent ABG revealed improvement of PCO2. In addition, the patient was treated with IV steroids and was treated with multiple nebulizer treatments. Patient's transfer to [facility] was initiated in ER. Labs revealed unremarkable BMP with normal renal function, glucose 164, CRP was 323, , troponin high-sensitivity was 20, LFTs normal, WBC was elevated 15.6, H&H was normal 14.9/46.3, platelets 411. Portable chest x-ray revealed mild atelectasis or early infiltrates in lung bases, hyperhydration with chronic interstitial markings bilaterally. COVID-19 was negative. Patient also underwent CT chest without contrast for better evaluation of respiratory status and it revealed:     1. Marked emphysematous changes. 2. Mild lingular infiltrates versus atelectasis.    3. Scattered tiny nodular and patchy opacities; nonspecific. Follow-up CT    imaging in 3 months is recommended to document stability. 4. Mild mediastinal lymphadenopathy; some of this may be reactive, but there is    also evidence of chronic granulomatous disease. 5. Innumerable hypodense lesions throughout the liver suspected represent    cysts, or possibly cysts and hemangiomas. Consider nonemergent multiphase CT    imaging of the liver for confirmation and to exclude a mass. 6. Cholelithiasis.         After spending more than 24 hours in the emergency room due to difficulty with transfer to higher level care facility (given current COVID pandemic crisis )the patient was deemed appropriate for admission to our facility since his respiratory status improved in ER. Morning the patient looks comfortable, on 4 L nasal cannula and has no complaints            Past Medical History:        Diagnosis Date    Diabetes mellitus ([address]        Past Surgical History:  History reviewed. No pertinent surgical history. Home Medications:   No current facility-administered medications on file prior to encounter. Current Outpatient Medications on File Prior to Encounter   Medication Sig Dispense Refill    metFORMIN (GLUCOPHAGE) 500 MG tablet Take 500 mg by mouth daily (with breakfast)      naproxen (NAPROSYN) 375 MG tablet Take 1 tablet by mouth 2 times daily (with meals) 20 tablet 0       Allergies:  Patient has no known allergies. Social History:    reports that he has been smoking. He has been smoking about 1.00 pack per day. He has never used smokeless tobacco. He reports that he does not use drugs. Family History:   History reviewed. No pertinent family history. Review of systems:  Constitutional: Positive for fever, no night sweats, positive fatigue  Head: no headache, no head injury  Eye: no blurring of vision, no double vision.   Ears: no earache, hearing difficulty, no tinnitus  Mouth/throat: no sore throat  Lungs: Positive cough, positive shortness of breath, positive wheeze  CVS: no palpitation, no chest pain  GI: no abdominal pain, no nausea , no vomiting, no constipation  ITA: no dysuria, frequency and urgency  Musculoskeletal: no joint pain, positive for myalgia  Endocrine: no polyuria, polydypsia, no cold or heat intolerence  Hematology: no anemia, no easy brusing or bleeding, no hx of clotting disorder  Dermatology: no skin rash, no eczema  Psychiatry: no depression, no anxiety  Neurology: no syncope, no seizures, no numbness or tingling of hands, no numbness or tingling of feet, no paresis      Vitals:   Vitals:    01/11/22 0106   BP: 100/63   Pulse: 99   Resp: 22   Temp: 97.3   SpO2: 93 % on 4 L      BMI: Body mass index is 19.39 kg/m².     Physical Exam:  General Appearance: alert and oriented to person, place and time, in no acute distress  Cardiovascular: normal rate, regular rhythm, normal S1 and S2, no murmurs, rubs, clicks, or gallops, distal pulses intact  Pulmonary/Chest: Scattered bilateral expiratory wheezes, no rales or rhonchi, normal air movement, no respiratory distress  Abdomen: soft, non-tender, non-distended, normal bowel sounds  Extremities: no cyanosis, clubbing or edema  Skin: warm and dry, no rash or erythema  Head: normocephalic and atraumatic  Eyes: pupils equal, round, and reactive to light  Neck: supple and non-tender without mass, no thyromegaly   Musculoskeletal: normal range of motion, no joint swelling, deformity or tenderness  Neurological: alert, oriented, normal speech, no focal findings or movement disorder noted    Review of Labs and Diagnostic Testing:    Recent Results (from the past 24 hour(s))   Glucose, Whole Blood    Collection Time: 01/10/22  5:52 PM   Result Value Ref Range    POC Glucose 217 (H) 65 - 99 mg/dL   Glucose, Whole Blood    Collection Time: 01/10/22  9:40 PM   Result Value Ref Range    POC Glucose 217 (H) 65 - 99 mg/dL       Radiology:     CT CHEST WO CONTRAST    Result Date: 1/10/2022  EXAMINATION: CT CHEST WO CONTRAST HISTORY: Reason for exam:->evaluate for pneumonia is COMPARISON: XR chest 1/9/2022 TECHNIQUE: Axial, Coronal, and Sagittal images were created without the administration of IV contrast material. Dose reduction techniques were achieved by using automated exposure control and/or adjustment of mA and/or kV according to patient size and/or use of iterative reconstruction technique. FINDINGS: LUNGS: Marked emphysematous changes. A few areas of mild patchy and strandy opacities within the anterior lateral left lung base. Tiny faint patchy and nodular opacities scattered within the lungs, most notable within the posterior right lung base. 6 mm nodule within left upper lobe adjacent the hilum. PLEURA: No mass, effusion, or pneumothorax. VASCULATURE: No abnormality. ARIADNA: No mass or adenopathy. MEDIASTINUM: Mild lymphadenopathy. Calcified subcarinal lymph nodes. CARDIAC: Trace amount of pericardial fluid. No significant cardiac enlargement. AORTA: No aneurysm or dissection. CHEST WALL: No mass or axillary adenopathy. BONES: No bone lesion or fracture. LIMITED ABDOMEN: Innumerable small and large hypodense lesions throughout the liver; larger lesions tend to measure fluid density. Multiple dense calcifications within the liver. Numerous tiny stones layering within the gallbladder. Bilateral adrenal gland hypertrophy. Old incompletely healed posterior lateral right eighth rib fracture. Old healed left rib fractures. Limited images of the upper abdomen. OTHER: Negative. 1. Marked emphysematous changes. 2. Mild lingular infiltrates versus atelectasis. 3. Scattered tiny nodular and patchy opacities; nonspecific. Follow-up CT imaging in 3 months is recommended to document stability. 4. Mild mediastinal lymphadenopathy; some of this may be reactive, but there is also evidence of chronic granulomatous disease.  5. Innumerable hypodense lesions throughout the liver suspected represent cysts, or possibly cysts and hemangiomas. Consider nonemergent multiphase CT imaging of the liver for confirmation and to exclude a mass. 6. Cholelithiasis. XR CHEST PORTABLE    Result Date: 1/9/2022  EXAM: XR CHEST PORTABLE HISTORY: Reason for exam:->shortness of breath COMPARISON: Portable chest from 9/3/2021. TECHNIQUE: Portable chest was done at 1:11 PM. REPORT: Trachea is midline. Mediastinum is not widened. Heart size is unremarkable. The lungs show hyperaeration with chronic changes bilaterally. There is slight atelectasis or early infiltrates in the lung bases. No effusion or nodule or pneumothorax is noted. Diaphragm is intact. The bony elements show mild osteopenic and degenerative changes. 1. Mild atelectasis or early infiltrates in the lung bases. 2. Hyperaeration with chronic interstitial markings bilaterally. EKG: Sinus tachycardia, rate 114, right axis deviation, nonspecific ST and T wave changes    Assessment/ Plan:    1. Acute exacerbation of COPD -patient is on IV steroids, frequent nebulizer treatments, IV Rocephin and Zithromax started 1/10/2022, Symbicort. Clinically improving  2. Acute hypoxemic hypercapnic respiratory failure secondary to #1 -required BiPAP in the emergency room, was able to be weaned off and currently on 4 L nasal cannula, continue titrating oxygen off as tolerates. Patient is not oxygen dependent at home  3. Diabetes mellitus type 2, non-insulin-dependent -patient will be started on sliding scale insulin for anticipated steroid-induced hyperglycemia  4. Pulmonary nodules per CT chest -outpatient follow-up in 3 months recommended to document stability  5. Tobacco dependence      Advanced Care Plan    (x )  I confirmed that the patient's Advanced Care Plan is present, code status documented, or surrogate decision maker is listed in the patient's medical record.   ( )  The patient's advanced care plan is not present because: (select)   ( ) I confirmed today that the patient does not wish or was not able to name a surrogate decision maker or provide an [address] ( ) Hospice care is currently being provided or has been provided this calender year. ( )  I did not confirm today the presence of an [address] or surrogate decision maker documented within the patient's medical record. (Does not satisfy MIPS performance). Documentation of Current Medications in the Medical Record   (x )  I have utilized all available immediate resources to obtain, update, or review the patient's current medications. If Yes, Stop Here  ( ) The patient is not eligible for medications reconciliation; the patient is in an emergent medical situation where delaying treatment would jeopardize the patient's health. ( ) I did not confirm, update or review the patient's current list of medications today. (does not satisfy MIPS performance)        Medical Necessity: Inpatient admission is appropriate for this patient secondary to the need of IV steroids, IV antibiotics, frequent nebulizer treatments, oxygen supplement    Estimated length of stay: 3 days. The beneficiary may reasonably be expected to be discharged or transferred to a hospital within 96 hours after admission.     DVT prophylaxis:   [x] Lovenox   [] SCDs   [] SQ Heparin   [] Encourage ambulation, low risk for DVT, no chemical or mechanical    prophylaxis necessary      [] Already on Anticoagulation    Anticipated Disposition upon discharge:   [x] Home   [] Home with Home Health   [] Ivan Ghulam   [] [address] [place] [address],[address]      Electronically signed by Olaf Phoenix MD on 1/11/2022 at 3:59 AM

## 2022-01-11 NOTE — PROGRESS NOTES
Quality flow rounds held on 1/11/22     Isaak Guidry is admitted for  COPD    Length of stay 1. Education:    Needed Education: COPD, meds, follow up,diet      Do you have any questions regarding your plan of care while at the hospital? denies    Planned Disposition:               [x]  Home when able                [] Swing Bed                [] ECF/SNF               [] Other/TBD    Barriers to Discharge:    Can you afford your medications? yes   Do you have transportation to follow up appointments? Drives self   Do you need any new equipment at home? denies   Current equipment includes   none    Do you have a living will or durable power of  for healthcare? denies               If yes do we have a copy on file? n/a    Do you or your family have any questions or concerns we haven't already discussed? Denies    Lives alone, states has a grandson that helps when needed. [facility] and also has local PCP-Dr Villatoro in [place]. Denies needs at discharge and plans to return home. Bowen Harley and writer present for rounding.

## 2022-01-11 NOTE — ACP (ADVANCE CARE PLANNING)
Advance Care Planning     Advance Care Planning Activator (Inpatient)  Conversation Note      Date of ACP Conversation: 1/11/2022     Conversation Conducted with: Patient with Decision Making Capacity    ACP Activator: Tamra Henrik, [address] [facility] Decision Maker:     Current Designated Health Care Decision Maker: Pt is uncertain who he wants to name to make medical decisions for him and prefers to think about this. Click here to complete Healthcare Decision Makers including section of the Healthcare Decision Maker Relationship (ie \"Primary\")  Today we discussed [address] [place] [address]. The patient is considering options. Care Preferences    Ventilation: \"If you were in your present state of health and suddenly became very ill and were unable to breathe on your own, what would your preference be about the use of a ventilator (breathing machine) if it were available to you? \"      Would the patient desire the use of ventilator (breathing machine)?: yes    \"If your health worsens and it becomes clear that your chance of recovery is unlikely, what would your preference be about the use of a ventilator (breathing machine) if it were available to you? \"     Would the patient desire the use of ventilator (breathing machine)?: No      Resuscitation  \"CPR works best to restart the heart when there is a sudden event, like a heart attack, in someone who is otherwise healthy. Unfortunately, CPR does not typically restart the heart for people who have serious health conditions or who are very sick. \"    \"In the event your heart stopped as a result of an underlying serious health condition, would you want attempts to be made to restart your heart (answer \"yes\" for attempt to resuscitate) or would you prefer a natural death (answer \"no\" for do not attempt to resuscitate)? \" yes       [] Yes   [x] No   Educated Patient / Chaim Wilson regarding differences between Advance Directives and portable DNR orders.     Length of ACP Conversation in minutes:  10    Conversation Outcomes:  [x] ACP discussion completed  [] Existing advance directive reviewed with patient; no changes to patient's previously recorded wishes  [] New Advance Directive completed  [] Portable Do Not Rescitate prepared for Provider review and signature  [] POLST/POST/MOLST/MOST prepared for Provider review and signature      Follow-up plan:    [] Schedule follow-up conversation to continue planning  [] Referred individual to Provider for additional questions/concerns   [] Advised patient/agent/surrogate to review completed ACP document and update if needed with changes in condition, patient preferences or care setting    [x] This note routed to one or more involved healthcare providers

## 2022-01-11 NOTE — PROGRESS NOTES
In to give patient his medication and ask to scan his wrist band, pt refuses stating he is eating his dinner and becoming verbally aggressive with nursing. Pt left to eat dinner.

## 2022-01-12 PROBLEM — E44.0 MODERATE MALNUTRITION (HCC): Status: ACTIVE | Noted: 2022-01-12

## 2022-01-12 LAB
ANION GAP SERPL CALCULATED.3IONS-SCNC: 7 MMOL/L (ref 9–17)
BUN BLDV-MCNC: 27 MG/DL (ref 8–23)
BUN/CREAT BLD: 60 (ref 9–20)
CALCIUM SERPL-MCNC: 9.2 MG/DL (ref 8.6–10.4)
CHLORIDE BLD-SCNC: 105 MMOL/L (ref 98–107)
CO2: 31 MMOL/L (ref 20–31)
CREAT SERPL-MCNC: 0.45 MG/DL (ref 0.7–1.2)
GFR AFRICAN AMERICAN: >60 ML/MIN
GFR NON-AFRICAN AMERICAN: >60 ML/MIN
GFR SERPL CREATININE-BSD FRML MDRD: ABNORMAL ML/MIN/{1.73_M2}
GFR SERPL CREATININE-BSD FRML MDRD: ABNORMAL ML/MIN/{1.73_M2}
GLUCOSE BLD-MCNC: 214 MG/DL (ref 65–99)
GLUCOSE BLD-MCNC: 237 MG/DL (ref 65–99)
GLUCOSE BLD-MCNC: 263 MG/DL (ref 65–99)
GLUCOSE BLD-MCNC: 282 MG/DL (ref 65–99)
GLUCOSE BLD-MCNC: 332 MG/DL (ref 70–99)
POTASSIUM SERPL-SCNC: 4.7 MMOL/L (ref 3.7–5.3)
SODIUM BLD-SCNC: 143 MMOL/L (ref 135–144)

## 2022-01-12 PROCEDURE — 36415 COLL VENOUS BLD VENIPUNCTURE: CPT

## 2022-01-12 PROCEDURE — 2700000000 HC OXYGEN THERAPY PER DAY

## 2022-01-12 PROCEDURE — 80048 BASIC METABOLIC PNL TOTAL CA: CPT

## 2022-01-12 PROCEDURE — 94761 N-INVAS EAR/PLS OXIMETRY MLT: CPT

## 2022-01-12 PROCEDURE — 6370000000 HC RX 637 (ALT 250 FOR IP): Performed by: INTERNAL MEDICINE

## 2022-01-12 PROCEDURE — 6360000002 HC RX W HCPCS: Performed by: INTERNAL MEDICINE

## 2022-01-12 PROCEDURE — 94669 MECHANICAL CHEST WALL OSCILL: CPT

## 2022-01-12 PROCEDURE — 1200000000 HC SEMI PRIVATE

## 2022-01-12 PROCEDURE — 94640 AIRWAY INHALATION TREATMENT: CPT

## 2022-01-12 PROCEDURE — 2580000003 HC RX 258: Performed by: INTERNAL MEDICINE

## 2022-01-12 PROCEDURE — 82947 ASSAY GLUCOSE BLOOD QUANT: CPT

## 2022-01-12 RX ADMIN — INSULIN LISPRO 2 UNITS: 100 INJECTION, SOLUTION INTRAVENOUS; SUBCUTANEOUS at 21:17

## 2022-01-12 RX ADMIN — Medication 1 CAPSULE: at 16:56

## 2022-01-12 RX ADMIN — INSULIN LISPRO 2 UNITS: 100 INJECTION, SOLUTION INTRAVENOUS; SUBCUTANEOUS at 16:56

## 2022-01-12 RX ADMIN — GUAIFENESIN 600 MG: 600 TABLET, EXTENDED RELEASE ORAL at 21:18

## 2022-01-12 RX ADMIN — BUDESONIDE AND FORMOTEROL FUMARATE DIHYDRATE 2 PUFF: 160; 4.5 AEROSOL RESPIRATORY (INHALATION) at 09:41

## 2022-01-12 RX ADMIN — IPRATROPIUM BROMIDE AND ALBUTEROL SULFATE 1 AMPULE: .5; 3 SOLUTION RESPIRATORY (INHALATION) at 09:39

## 2022-01-12 RX ADMIN — ACETAMINOPHEN 650 MG: 325 TABLET, FILM COATED ORAL at 07:50

## 2022-01-12 RX ADMIN — IPRATROPIUM BROMIDE AND ALBUTEROL SULFATE 1 AMPULE: .5; 3 SOLUTION RESPIRATORY (INHALATION) at 18:18

## 2022-01-12 RX ADMIN — BUDESONIDE AND FORMOTEROL FUMARATE DIHYDRATE 2 PUFF: 160; 4.5 AEROSOL RESPIRATORY (INHALATION) at 22:15

## 2022-01-12 RX ADMIN — INSULIN LISPRO 3 UNITS: 100 INJECTION, SOLUTION INTRAVENOUS; SUBCUTANEOUS at 07:52

## 2022-01-12 RX ADMIN — ACETAMINOPHEN 650 MG: 325 TABLET, FILM COATED ORAL at 21:18

## 2022-01-12 RX ADMIN — Medication 1 CAPSULE: at 07:51

## 2022-01-12 RX ADMIN — IPRATROPIUM BROMIDE AND ALBUTEROL SULFATE 1 AMPULE: .5; 3 SOLUTION RESPIRATORY (INHALATION) at 22:15

## 2022-01-12 RX ADMIN — METFORMIN HYDROCHLORIDE 500 MG: 500 TABLET ORAL at 07:51

## 2022-01-12 RX ADMIN — CEFTRIAXONE SODIUM 1000 MG: 1 INJECTION, POWDER, FOR SOLUTION INTRAMUSCULAR; INTRAVENOUS at 18:31

## 2022-01-12 RX ADMIN — INSULIN LISPRO 2 UNITS: 100 INJECTION, SOLUTION INTRAVENOUS; SUBCUTANEOUS at 12:09

## 2022-01-12 RX ADMIN — ENOXAPARIN SODIUM 40 MG: 100 INJECTION SUBCUTANEOUS at 07:51

## 2022-01-12 RX ADMIN — PANTOPRAZOLE SODIUM 40 MG: 40 TABLET, DELAYED RELEASE ORAL at 06:14

## 2022-01-12 RX ADMIN — METHYLPREDNISOLONE SODIUM SUCCINATE 40 MG: 40 INJECTION, POWDER, FOR SOLUTION INTRAMUSCULAR; INTRAVENOUS at 00:57

## 2022-01-12 RX ADMIN — ALBUTEROL SULFATE 2.5 MG: 2.5 SOLUTION RESPIRATORY (INHALATION) at 05:34

## 2022-01-12 RX ADMIN — METHYLPREDNISOLONE SODIUM SUCCINATE 40 MG: 40 INJECTION, POWDER, FOR SOLUTION INTRAMUSCULAR; INTRAVENOUS at 07:50

## 2022-01-12 RX ADMIN — GUAIFENESIN 600 MG: 600 TABLET, EXTENDED RELEASE ORAL at 07:51

## 2022-01-12 RX ADMIN — AZITHROMYCIN MONOHYDRATE 500 MG: 500 INJECTION, POWDER, LYOPHILIZED, FOR SOLUTION INTRAVENOUS at 19:13

## 2022-01-12 RX ADMIN — SODIUM CHLORIDE, PRESERVATIVE FREE 10 ML: 5 INJECTION INTRAVENOUS at 07:51

## 2022-01-12 ASSESSMENT — PAIN DESCRIPTION - LOCATION
LOCATION: GENERALIZED
LOCATION: GENERALIZED

## 2022-01-12 ASSESSMENT — PAIN SCALES - GENERAL
PAINLEVEL_OUTOF10: 0
PAINLEVEL_OUTOF10: 7
PAINLEVEL_OUTOF10: 4
PAINLEVEL_OUTOF10: 1

## 2022-01-12 ASSESSMENT — PAIN DESCRIPTION - PAIN TYPE
TYPE: CHRONIC PAIN
TYPE: CHRONIC PAIN

## 2022-01-12 NOTE — PROGRESS NOTES
Comprehensive Nutrition Assessment    Type and Reason for Visit:  Initial    Nutrition Recommendations/Plan:  Encourage oral intakes    Nutrition Assessment:  Moderate malnutrition r/t inadequate nutrient intakes, AEB mild to moderate fat and muscle losses. Weight losses over time, not triggering for significance at this time. Hyperglycemia r/t steroid provision, with Diabetes history (A1C 5.4). Asleep upon visit, not awaking to call. Will trial Glucerna supplement r/t concerning weight losses and malnutrition indices. Malnutrition Assessment:  Malnutrition Status: Moderate malnutrition    Context:  Acute Illness     Findings of the 6 clinical characteristics of malnutrition:  Energy Intake:  Unable to assess  Weight Loss:  No significant weight loss     Body Fat Loss:  1 - Mild body fat loss Buccal region   Muscle Mass Loss:  1 - Mild muscle mass loss Scapula (trapezius),Temples (temporalis),Clavicles (pectoralis & deltoids)  Fluid Accumulation:  No significant fluid accumulation     Strength:  Not Performed    Estimated Daily Nutrient Needs:  Energy (kcal):  4238-0588 (25-30); Weight Used for Energy Requirements:  Current     Protein (g):  82-95 (1.3-1.5); Weight Used for Protein Requirements:  Ideal        Fluid (ml/day):  1900; Method Used for Fluid Requirements:  1 ml/kcal      Nutrition Related Findings:  mild to moderate fat and muscle losses. Wounds:  None       Current Nutrition Therapies:    ADULT DIET;  Regular; 4 carb choices (60 gm/meal)  ADULT ORAL NUTRITION SUPPLEMENT; Breakfast, Lunch, Dinner; Diabetic Oral Supplement    Anthropometric Measures:  · Height: 5' 11\" (180.3 cm)  · Current Body Weight: 139 lb (63 kg)   · Admission Body Weight: 150 lb (68 kg)    · Usual Body Weight: 147 lb 9.6 oz (67 kg) (in September)     · Ideal Body Weight: 172 lbs; % Ideal Body Weight 80.8 %   · BMI: 19.4  · Adjusted Body Weight:  ; No Adjustment    · BMI Categories: Underweight (BMI less than 22) age over 65       Nutrition Diagnosis:   · Altered nutrition-related lab values related to endocrine dysfuntion as evidenced by lab values (glucose)     Lab Results   Component Value Date     01/12/2022    K 4.7 01/12/2022     01/12/2022    CO2 31 01/12/2022    BUN 27 (H) 01/12/2022    CREATININE 0.45 (L) 01/12/2022    GLUCOSE 332 (H) 01/12/2022    CALCIUM 9.2 01/12/2022    PROT 8.3 01/09/2022    LABALBU 3.9 01/09/2022    BILITOT 0.41 01/09/2022    ALKPHOS 106 01/09/2022    AST 7 01/09/2022    ALT 6 01/09/2022    LABGLOM >60 01/12/2022    GFRAA >60 01/12/2022     Lab Results   Component Value Date    LABA1C 5.4 01/10/2022     Lab Results   Component Value Date     01/10/2022     No results found for: [address]     01/10/22  1752 01/10/22  2140 01/11/22  0751 01/11/22  1125 01/11/22  1603 01/11/22  1952 01/12/22  0732   POCGLU 217* 217* 262* 297* 223* 264* 263*       Nutrition Interventions:   Food and/or Nutrient Delivery:  Continue Current Diet  Nutrition Education/Counseling:  No recommendation at this time   Coordination of Nutrition Care:  Continue to monitor while inpatient    Goals:  PO >75% meals       Nutrition Monitoring and Evaluation:   Behavioral-Environmental Outcomes:  None Identified   Food/Nutrient Intake Outcomes:  Food and Nutrient Intake  Physical Signs/Symptoms Outcomes:  Biochemical Data,Weight     Discharge Planning:     Too soon to determine     Electronically signed by Elaine Pat RD, LD on 1/12/22 at 11:27 AM EST    Contact: [phone]

## 2022-01-12 NOTE — PROGRESS NOTES
Hospitalist Progress Note  1/12/2022 9:28 AM  Subjective:   Admit Date: 1/9/2022  PCP: Gabo Galvan MD    Interval History:     The patient states that he feels better, however still very short of breath. Has no other complaints. Denies chest pain, abdominal pain, nausea or vomiting. Still on oxygen 3 L nasal cannula. Patient reports that he is not wearing oxygen at home      Diet: ADULT DIET; Regular; 4 carb choices (60 gm/meal)  Medications:   Scheduled Meds:   pantoprazole  40 mg Oral QAM AC    nicotine  1 patch TransDERmal Daily    metFORMIN  500 mg Oral Daily with breakfast    sodium chloride flush  5-40 mL IntraVENous 2 times per day    enoxaparin  40 mg SubCUTAneous Daily    ipratropium-albuterol  1 ampule Inhalation Q4H WA    [START ON 1/13/2022] predniSONE  40 mg Oral Daily    azithromycin  500 mg IntraVENous Q24H    cefTRIAXone (ROCEPHIN) IV  1,000 mg IntraVENous Q24H    insulin lispro  0-6 Units SubCUTAneous TID WC    insulin lispro  0-3 Units SubCUTAneous Nightly    budesonide-formoterol  2 puff Inhalation BID    guaiFENesin  600 mg Oral BID    lactobacillus  1 capsule Oral BID WC     Continuous Infusions:   sodium chloride      dextrose       PRN Medications: sodium chloride flush, sodium chloride, ondansetron **OR** ondansetron, polyethylene glycol, acetaminophen **OR** acetaminophen, albuterol, glucose, dextrose, glucagon (rDNA), dextrose    Objective:   Vitals: /63   Pulse 87   Temp 97.9 °F (36.6 °C) (Oral)   Resp 16   Ht 5' 11\" (1.803 m)   Wt 139 lb (63 kg)   SpO2 90%   BMI 19.39 kg/m²   BMI: Body mass index is 19.39 kg/m².     CBC:   Recent Labs     01/09/22  1320 01/11/22  0425   WBC 15.6* 8.6   HGB 14.9 13.5    327     BMP:    Recent Labs     01/09/22  1320 01/11/22  0425 01/12/22  0430    142 143   K 4.2 5.0 4.7    105 105   CO2 25 28 31   BUN 26* 35* 27*   CREATININE 0.59* 0.41* 0.45*   GLUCOSE 164* 216* 332*     Hepatic:   Recent Labs 01/09/22  1320   AST 7   ALT 6   BILITOT 0.41   ALKPHOS 106     Physical Exam:    General Appearance: alert and oriented to person, place and time, in no acute distress  Cardiovascular: normal rate, regular rhythm, normal S1 and S2, no murmurs  Pulmonary/Chest: Scattered bilateral expiratory wheezes and rhonchi, diminished breath sounds bilaterally, no respiratory distress  Abdomen: soft, non-tender, non-distended, normal bowel sounds  Extremities: no cyanosis, clubbing or edema  Skin: warm and dry, no rash   Neurological: alert, oriented, normal speech, no focal findings or movement disorder noted    Assessment and Plan:       1. Acute exacerbation of COPD -continue on IV steroids, frequent nebulizer treatments, IV Rocephin and Zithromax started 1/10/2022, Symbicort. 2.  Acute hypoxemic hypercapnic respiratory failure secondary to #1 -required BiPAP in the emergency room, was able to be weaned off and currently on 3 L nasal cannula, continue titrating oxygen off as tolerates. Patient is not oxygen dependent at home  3. Diabetes mellitus type 2, non-insulin-dependent - on sliding scale insulin for anticipated steroid-induced hyperglycemia  4. Pulmonary nodules per CT chest -needs outpatient follow-up in 3 months recommended to document stability  5.   Tobacco dependence -on nicotine patch      Electronically signed by Deniz Fajardo MD on 1/12/2022 at 9:28 AM    [facility]

## 2022-01-13 LAB
GLUCOSE BLD-MCNC: 141 MG/DL (ref 65–99)
GLUCOSE BLD-MCNC: 211 MG/DL (ref 65–99)
GLUCOSE BLD-MCNC: 267 MG/DL (ref 65–99)
GLUCOSE BLD-MCNC: 278 MG/DL (ref 65–99)

## 2022-01-13 PROCEDURE — 6360000002 HC RX W HCPCS: Performed by: INTERNAL MEDICINE

## 2022-01-13 PROCEDURE — 2700000000 HC OXYGEN THERAPY PER DAY

## 2022-01-13 PROCEDURE — 94669 MECHANICAL CHEST WALL OSCILL: CPT

## 2022-01-13 PROCEDURE — 94761 N-INVAS EAR/PLS OXIMETRY MLT: CPT

## 2022-01-13 PROCEDURE — 2580000003 HC RX 258: Performed by: INTERNAL MEDICINE

## 2022-01-13 PROCEDURE — 1200000000 HC SEMI PRIVATE

## 2022-01-13 PROCEDURE — 82947 ASSAY GLUCOSE BLOOD QUANT: CPT

## 2022-01-13 PROCEDURE — 6370000000 HC RX 637 (ALT 250 FOR IP): Performed by: INTERNAL MEDICINE

## 2022-01-13 PROCEDURE — 94640 AIRWAY INHALATION TREATMENT: CPT

## 2022-01-13 RX ORDER — IPRATROPIUM BROMIDE AND ALBUTEROL SULFATE 2.5; .5 MG/3ML; MG/3ML
1 SOLUTION RESPIRATORY (INHALATION) 4 TIMES DAILY
Status: DISCONTINUED | OUTPATIENT
Start: 2022-01-13 | End: 2022-01-14 | Stop reason: HOSPADM

## 2022-01-13 RX ADMIN — Medication 1 CAPSULE: at 08:28

## 2022-01-13 RX ADMIN — GUAIFENESIN 600 MG: 600 TABLET, EXTENDED RELEASE ORAL at 08:28

## 2022-01-13 RX ADMIN — Medication 1 CAPSULE: at 17:01

## 2022-01-13 RX ADMIN — ENOXAPARIN SODIUM 40 MG: 100 INJECTION SUBCUTANEOUS at 08:28

## 2022-01-13 RX ADMIN — PANTOPRAZOLE SODIUM 40 MG: 40 TABLET, DELAYED RELEASE ORAL at 06:16

## 2022-01-13 RX ADMIN — INSULIN LISPRO 2 UNITS: 100 INJECTION, SOLUTION INTRAVENOUS; SUBCUTANEOUS at 21:08

## 2022-01-13 RX ADMIN — GUAIFENESIN 600 MG: 600 TABLET, EXTENDED RELEASE ORAL at 21:10

## 2022-01-13 RX ADMIN — IPRATROPIUM BROMIDE AND ALBUTEROL SULFATE 1 AMPULE: .5; 3 SOLUTION RESPIRATORY (INHALATION) at 16:09

## 2022-01-13 RX ADMIN — ACETAMINOPHEN 650 MG: 325 TABLET, FILM COATED ORAL at 15:27

## 2022-01-13 RX ADMIN — INSULIN LISPRO 3 UNITS: 100 INJECTION, SOLUTION INTRAVENOUS; SUBCUTANEOUS at 16:49

## 2022-01-13 RX ADMIN — INSULIN LISPRO 2 UNITS: 100 INJECTION, SOLUTION INTRAVENOUS; SUBCUTANEOUS at 11:39

## 2022-01-13 RX ADMIN — SODIUM CHLORIDE, PRESERVATIVE FREE 10 ML: 5 INJECTION INTRAVENOUS at 21:11

## 2022-01-13 RX ADMIN — ALBUTEROL SULFATE 2.5 MG: 2.5 SOLUTION RESPIRATORY (INHALATION) at 05:02

## 2022-01-13 RX ADMIN — SODIUM CHLORIDE, PRESERVATIVE FREE 10 ML: 5 INJECTION INTRAVENOUS at 08:28

## 2022-01-13 RX ADMIN — PREDNISONE 40 MG: 20 TABLET ORAL at 08:28

## 2022-01-13 RX ADMIN — IPRATROPIUM BROMIDE AND ALBUTEROL SULFATE 1 AMPULE: .5; 3 SOLUTION RESPIRATORY (INHALATION) at 21:15

## 2022-01-13 RX ADMIN — IPRATROPIUM BROMIDE AND ALBUTEROL SULFATE 1 AMPULE: .5; 3 SOLUTION RESPIRATORY (INHALATION) at 09:35

## 2022-01-13 RX ADMIN — BUDESONIDE AND FORMOTEROL FUMARATE DIHYDRATE 2 PUFF: 160; 4.5 AEROSOL RESPIRATORY (INHALATION) at 21:16

## 2022-01-13 RX ADMIN — BUDESONIDE AND FORMOTEROL FUMARATE DIHYDRATE 2 PUFF: 160; 4.5 AEROSOL RESPIRATORY (INHALATION) at 09:30

## 2022-01-13 RX ADMIN — CEFTRIAXONE SODIUM 1000 MG: 1 INJECTION, POWDER, FOR SOLUTION INTRAMUSCULAR; INTRAVENOUS at 16:59

## 2022-01-13 RX ADMIN — METFORMIN HYDROCHLORIDE 500 MG: 500 TABLET ORAL at 08:28

## 2022-01-13 ASSESSMENT — PAIN SCALES - GENERAL
PAINLEVEL_OUTOF10: 4
PAINLEVEL_OUTOF10: 2
PAINLEVEL_OUTOF10: 7
PAINLEVEL_OUTOF10: 4

## 2022-01-13 ASSESSMENT — PAIN DESCRIPTION - LOCATION: LOCATION: GENERALIZED

## 2022-01-13 ASSESSMENT — PAIN DESCRIPTION - PAIN TYPE: TYPE: CHRONIC PAIN

## 2022-01-13 NOTE — PROGRESS NOTES
Hospitalist Progress Note  1/13/2022 5:11 AM  Subjective:   Admit Date: 1/9/2022  PCP: Mir Flowers MD    Interval History:     The patient is awake, alert, getting breathing treatments. Has no complaints. Overall improving, still on 2 L of oxygen. Night was uneventful. Has no chest pain, no nausea, no vomiting, no diarrhea    Diet: ADULT DIET; Regular; 4 carb choices (60 gm/meal)  ADULT ORAL NUTRITION SUPPLEMENT; Breakfast, Lunch, Dinner; Diabetic Oral Supplement  Medications:   Scheduled Meds:   pantoprazole  40 mg Oral QAM AC    nicotine  1 patch TransDERmal Daily    metFORMIN  500 mg Oral Daily with breakfast    sodium chloride flush  5-40 mL IntraVENous 2 times per day    enoxaparin  40 mg SubCUTAneous Daily    ipratropium-albuterol  1 ampule Inhalation Q4H WA    predniSONE  40 mg Oral Daily    cefTRIAXone (ROCEPHIN) IV  1,000 mg IntraVENous Q24H    insulin lispro  0-6 Units SubCUTAneous TID WC    insulin lispro  0-3 Units SubCUTAneous Nightly    budesonide-formoterol  2 puff Inhalation BID    guaiFENesin  600 mg Oral BID    lactobacillus  1 capsule Oral BID WC     Continuous Infusions:   sodium chloride      dextrose       PRN Medications: sodium chloride flush, sodium chloride, ondansetron **OR** ondansetron, polyethylene glycol, acetaminophen **OR** acetaminophen, albuterol, glucose, dextrose, glucagon (rDNA), dextrose    Objective:   Vitals: /64   Pulse 90   Temp 97.9 °F (36.6 °C) (Oral)   Resp 18   Ht 5' 11\" (1.803 m)   Wt 139 lb (63 kg)   SpO2 93%   BMI 19.39 kg/m²   BMI: Body mass index is 19.39 kg/m².     CBC:   Recent Labs     01/11/22  0425   WBC 8.6   HGB 13.5        BMP:    Recent Labs     01/11/22 0425 01/12/22  0430    143   K 5.0 4.7    105   CO2 28 31   BUN 35* 27*   CREATININE 0.41* 0.45*   GLUCOSE 216* 332*       Physical Exam:    General Appearance: alert and oriented to person, place and time, in no acute distress  Cardiovascular: normal rate, regular rhythm, normal S1 and S2, no murmurs  Pulmonary/Chest: Scattered bilateral expiratory wheezes , diminished breath sounds bilaterally, no respiratory distress  Abdomen: soft, non-tender, non-distended, normal bowel sounds  Extremities: no cyanosis, clubbing or edema  Skin: warm and dry, no rash   Neurological: alert, oriented, normal speech, no focal findings or movement disorder noted       Assessment and Plan:       1.  Acute exacerbation of COPD - improving with IV steroids, IV antibiotics, frequent aerosol treatments  2.  Acute hypoxemic hypercapnic respiratory failure secondary to #1 -required BiPAP in the emergency room, was able to be weaned off and currently on 2 L nasal cannula, continue titrating oxygen off as tolerates.  Patient is not oxygen dependent at home. Suspect he may need to be discharged with home O2. Discussed with the patient and he declined the option. 3.  Diabetes mellitus type 2, non-insulin-dependent - on sliding scale insulin for anticipated steroid-induced hyperglycemia  4.  Pulmonary nodules per CT chest -needs outpatient follow-up in 3 months recommended to document stability  5.  Tobacco dependence -on nicotine patch  6.   Moderate malnutrition -oral supplement added to diet      Electronically signed by Umer Diaz MD on 1/13/2022 at 5:11 AM    Rounding Hospitalist

## 2022-01-14 ENCOUNTER — APPOINTMENT (OUTPATIENT)
Dept: CT IMAGING | Age: 77
DRG: 190 | End: 2022-01-14
Payer: MEDICARE

## 2022-01-14 VITALS
OXYGEN SATURATION: 94 % | DIASTOLIC BLOOD PRESSURE: 64 MMHG | HEIGHT: 71 IN | WEIGHT: 139 LBS | RESPIRATION RATE: 18 BRPM | HEART RATE: 86 BPM | TEMPERATURE: 97.9 F | BODY MASS INDEX: 19.46 KG/M2 | SYSTOLIC BLOOD PRESSURE: 130 MMHG

## 2022-01-14 LAB
ANION GAP SERPL CALCULATED.3IONS-SCNC: 6 MMOL/L (ref 9–17)
BUN BLDV-MCNC: 26 MG/DL (ref 8–23)
BUN/CREAT BLD: 62 (ref 9–20)
CALCIUM SERPL-MCNC: 9.2 MG/DL (ref 8.6–10.4)
CHLORIDE BLD-SCNC: 100 MMOL/L (ref 98–107)
CO2: 33 MMOL/L (ref 20–31)
CREAT SERPL-MCNC: 0.42 MG/DL (ref 0.7–1.2)
GFR AFRICAN AMERICAN: >60 ML/MIN
GFR NON-AFRICAN AMERICAN: >60 ML/MIN
GFR SERPL CREATININE-BSD FRML MDRD: ABNORMAL ML/MIN/{1.73_M2}
GFR SERPL CREATININE-BSD FRML MDRD: ABNORMAL ML/MIN/{1.73_M2}
GLUCOSE BLD-MCNC: 155 MG/DL (ref 65–99)
GLUCOSE BLD-MCNC: 203 MG/DL (ref 70–99)
GLUCOSE BLD-MCNC: 272 MG/DL (ref 65–99)
POTASSIUM SERPL-SCNC: 4.7 MMOL/L (ref 3.7–5.3)
SODIUM BLD-SCNC: 139 MMOL/L (ref 135–144)

## 2022-01-14 PROCEDURE — 6360000004 HC RX CONTRAST MEDICATION: Performed by: INTERNAL MEDICINE

## 2022-01-14 PROCEDURE — 6370000000 HC RX 637 (ALT 250 FOR IP): Performed by: INTERNAL MEDICINE

## 2022-01-14 PROCEDURE — 6360000002 HC RX W HCPCS: Performed by: INTERNAL MEDICINE

## 2022-01-14 PROCEDURE — 80048 BASIC METABOLIC PNL TOTAL CA: CPT

## 2022-01-14 PROCEDURE — 74160 CT ABDOMEN W/CONTRAST: CPT

## 2022-01-14 PROCEDURE — 2580000003 HC RX 258: Performed by: INTERNAL MEDICINE

## 2022-01-14 PROCEDURE — 94640 AIRWAY INHALATION TREATMENT: CPT

## 2022-01-14 PROCEDURE — 94669 MECHANICAL CHEST WALL OSCILL: CPT

## 2022-01-14 PROCEDURE — 94761 N-INVAS EAR/PLS OXIMETRY MLT: CPT

## 2022-01-14 PROCEDURE — 82947 ASSAY GLUCOSE BLOOD QUANT: CPT

## 2022-01-14 PROCEDURE — 36415 COLL VENOUS BLD VENIPUNCTURE: CPT

## 2022-01-14 RX ORDER — GUAIFENESIN 600 MG/1
600 TABLET, EXTENDED RELEASE ORAL 2 TIMES DAILY
Qty: 20 TABLET | Refills: 0 | Status: SHIPPED | OUTPATIENT
Start: 2022-01-14

## 2022-01-14 RX ORDER — PREDNISONE 20 MG/1
40 TABLET ORAL DAILY
Qty: 10 TABLET | Refills: 0 | Status: SHIPPED | OUTPATIENT
Start: 2022-01-14 | End: 2022-01-19

## 2022-01-14 RX ORDER — BUDESONIDE AND FORMOTEROL FUMARATE DIHYDRATE 160; 4.5 UG/1; UG/1
2 AEROSOL RESPIRATORY (INHALATION) 2 TIMES DAILY
Qty: 10.2 G | Refills: 3 | Status: SHIPPED | OUTPATIENT
Start: 2022-01-14

## 2022-01-14 RX ORDER — AZITHROMYCIN 500 MG/1
500 TABLET, FILM COATED ORAL DAILY
Qty: 3 TABLET | Refills: 0 | Status: SHIPPED | OUTPATIENT
Start: 2022-01-14 | End: 2022-01-17

## 2022-01-14 RX ORDER — ALBUTEROL SULFATE 2.5 MG/3ML
2.5 SOLUTION RESPIRATORY (INHALATION)
Qty: 120 EACH | Refills: 3 | Status: SHIPPED | OUTPATIENT
Start: 2022-01-14

## 2022-01-14 RX ADMIN — PANTOPRAZOLE SODIUM 40 MG: 40 TABLET, DELAYED RELEASE ORAL at 05:56

## 2022-01-14 RX ADMIN — INSULIN LISPRO 1 UNITS: 100 INJECTION, SOLUTION INTRAVENOUS; SUBCUTANEOUS at 08:08

## 2022-01-14 RX ADMIN — INSULIN LISPRO 3 UNITS: 100 INJECTION, SOLUTION INTRAVENOUS; SUBCUTANEOUS at 12:05

## 2022-01-14 RX ADMIN — ENOXAPARIN SODIUM 40 MG: 100 INJECTION SUBCUTANEOUS at 08:07

## 2022-01-14 RX ADMIN — METFORMIN HYDROCHLORIDE 500 MG: 500 TABLET ORAL at 08:08

## 2022-01-14 RX ADMIN — PREDNISONE 40 MG: 20 TABLET ORAL at 08:08

## 2022-01-14 RX ADMIN — IPRATROPIUM BROMIDE AND ALBUTEROL SULFATE 1 AMPULE: .5; 3 SOLUTION RESPIRATORY (INHALATION) at 11:30

## 2022-01-14 RX ADMIN — SODIUM CHLORIDE, PRESERVATIVE FREE 10 ML: 5 INJECTION INTRAVENOUS at 08:15

## 2022-01-14 RX ADMIN — GUAIFENESIN 600 MG: 600 TABLET, EXTENDED RELEASE ORAL at 08:08

## 2022-01-14 RX ADMIN — Medication 1 CAPSULE: at 08:08

## 2022-01-14 RX ADMIN — IPRATROPIUM BROMIDE AND ALBUTEROL SULFATE 1 AMPULE: .5; 3 SOLUTION RESPIRATORY (INHALATION) at 05:36

## 2022-01-14 RX ADMIN — IOPAMIDOL 75 ML: 755 INJECTION, SOLUTION INTRAVENOUS at 06:24

## 2022-01-14 RX ADMIN — ACETAMINOPHEN 650 MG: 325 TABLET, FILM COATED ORAL at 08:08

## 2022-01-14 RX ADMIN — BUDESONIDE AND FORMOTEROL FUMARATE DIHYDRATE 2 PUFF: 160; 4.5 AEROSOL RESPIRATORY (INHALATION) at 09:18

## 2022-01-14 ASSESSMENT — PAIN DESCRIPTION - PAIN TYPE: TYPE: CHRONIC PAIN

## 2022-01-14 ASSESSMENT — PAIN SCALES - GENERAL: PAINLEVEL_OUTOF10: 7

## 2022-01-14 ASSESSMENT — PAIN DESCRIPTION - LOCATION: LOCATION: GENERALIZED

## 2022-01-14 NOTE — PROGRESS NOTES
IV and telemetry monitor removed without incident. Discharge instructions provided to patient at bedside. Verbalized understanding of follow up appointments, CT scan in 3 months, diet, activity, medications and reasons to return to ED/call physician. Copy of discharge instructions provided. Advised to call back directly if there are further questions, or if these symptoms fail to improve as anticipated or worsen. All questions answered. Copy of discharge instructions provided. Assisted to wheelchair and taken to personal car. Denies further needs.

## 2022-01-14 NOTE — PROGRESS NOTES
Acknowledge pt discharge to home today.  Pt identifies no discharge needs or concerns and refused home oxygen with Dr. Eden Weinstein MSW LSW 1/14/2022

## 2022-01-14 NOTE — DISCHARGE SUMMARY
Hospitalist Discharge Summary    Patient:  Christin Palafox  YOB: 1945    MRN: [id number]   Acct: [de-identified]    Primary Care Physician: Joe Yan MD    Admit date:  1/9/2022    Discharge date:  1/14/2022       Discharge Diagnoses:     1. Acute exacerbation of COPD  2. Acute hypoxemic hypercapnic respiratory failure  3. Diabetes mellitus type 2, non-insulin-dependent, controlled. Hemoglobin A1c 5.4% on 1/10/2022  4. Tobacco abuse  5. Pulmonary nodules per CT chest.  3-month follow-up recommended to document stability. CT chest ordered  6. Moderate malnutrition  7. Innumerable liver lesions per CT chest.  Follow-up CT abdomen pelvis with IV contrast on 1/14/2022 revealed:    1. Marked emphysematous changes within lung bases. 2. Innumerable lesions throughout the liver favoring cysts. Numerous   calcifications, some are round with dense peripheral calcifications. Findings   may represent prior infectious/parasitic etiology or polycystic disease. 3. Cholelithiasis. 4. 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            Discharge Medications:         Medication List      START taking these medications    albuterol (2.5 MG/3ML) 0.083% nebulizer solution  Commonly known as: PROVENTIL  Take 3 mLs by nebulization every 2 hours as needed for Wheezing     azithromycin 500 MG tablet  Commonly known as: Zithromax  Take 1 tablet by mouth daily for 3 days     budesonide-formoterol 160-4.5 MCG/ACT Aero  Commonly known as: SYMBICORT  Inhale 2 puffs into the lungs 2 times daily     guaiFENesin 600 MG extended release tablet  Commonly known as: MUCINEX  Take 1 tablet by mouth 2 times daily     predniSONE 20 MG tablet  Commonly known as: DELTASONE  Take 2 tablets by mouth daily for 5 days        CONTINUE taking these medications    metFORMIN 500 MG tablet  Commonly known as: GLUCOPHAGE     naproxen 375 MG tablet  Commonly known as: NAPROSYN  Take 1 tablet by mouth 2 times daily (with meals)           Where to Get Your Medications      These medications were sent to [address] [place] [address] [phone] [place] [phone]  [address] [place] [address]    Phone: [phone]   · albuterol (2.5 MG/3ML) 0.083% nebulizer solution  · azithromycin 500 MG tablet  · budesonide-formoterol 160-4.5 MCG/ACT Aero  · guaiFENesin 600 MG extended release tablet  · predniSONE 20 MG tablet         Diet: Low carbohydrate diet    Activity: Resume prehospital activities    Follow-up:  in 1-2 weeks with Eudora Dakin, MD    Physical Exam:    Vitals:  Patient Vitals for the past 24 hrs:   BP Temp Temp src Pulse Resp SpO2   01/13/22 2129 -- -- -- -- -- 91 %   01/13/22 2116 -- -- -- -- 18 91 %   01/13/22 2105 (!) 105/58 98.1 °F (36.7 °C) Oral 84 20 91 %   01/13/22 1609 -- -- -- -- 20 91 %   01/13/22 1511 111/67 97.9 °F (36.6 °C) Oral -- 19 91 %   01/13/22 0937 -- -- -- -- 22 91 %   01/13/22 0930 -- -- -- -- 22 92 %   01/13/22 0742 128/76 97.5 °F (36.4 °C) Oral 94 22 90 %     Weight: Weight: 139 lb (63 kg)     24 hour intake/output:    Intake/Output Summary (Last 24 hours) at 1/14/2022 0543  Last data filed at 1/13/2022 1430  Gross per 24 hour   Intake 730 ml   Output 950 ml   Net -220 ml     General Appearance: alert and oriented to person, place and time, in no acute distress  Cardiovascular: normal rate, regular rhythm, normal S1 and S2, no murmurs  Pulmonary/Chest: Scattered bilateral expiratory wheezes , diminished breath sounds bilaterally, no respiratory distress  Abdomen: soft, non-tender, non-distended, normal bowel sounds  Extremities: no cyanosis, clubbing or edema  Skin: warm and dry, no rash   Neurological: alert, oriented, normal speech, no focal findings or movement disorder noted       Hospital Course: The patient is a 78 y.o. male presented to the emergency room complaining of severe shortness of breath, fever, myalgias and generalized weakness. Patient reported that his symptoms started today before and progressively became worse. He had no associated chest pain, no abdominal pain. He had associated cough. Patient is a smoker and has a history of COPD, not on oxygen at home. Patient's oxygen saturations was in the 70s in ER. He was placed on BiPAP for respiratory support. Work-up in the emergency room revealed temperature of 98.7, heart rate 110, respirations 28, blood pressure 148/72. Initial ABGs revealed pH of 7.25/73.2/74.3/32. 6. After BiPAP treatment repeat ABGs revealed pH 7.18, PCO2 85.5, PO2 70.4, bicarb 32.1. BiPAP settings were adjusted and subsequent ABG revealed improvement of PCO2. In addition, the patient was treated with IV steroids and was treated with multiple nebulizer treatments. Patient's transfer to [facility] was initiated in ER. Labs revealed unremarkable BMP with normal renal function, glucose 164, CRP was 323, , troponin high-sensitivity was 20, LFTs normal, WBC was elevated 15.6, H&H was normal 14.9/46.3, platelets 348. Portable chest x-ray revealed mild atelectasis or early infiltrates in lung bases, hyperhydration with chronic interstitial markings bilaterally. COVID-19 was negative. Patient also underwent CT chest without contrast for better evaluation of respiratory status and it revealed:      1. Marked emphysematous changes. 2. Mild lingular infiltrates versus atelectasis. 3. Scattered tiny nodular and patchy opacities; nonspecific. Follow-up CT    imaging in 3 months is recommended to document stability. 4. Mild mediastinal lymphadenopathy; some of this may be reactive, but there is    also evidence of chronic granulomatous disease. The patient is a 78 y.o. male presented to the emergency room complaining of severe shortness of breath, fever, myalgias and generalized weakness. Patient reported that his symptoms started today before and progressively became worse.   He had no associated chest pain, no abdominal pain. He had associated cough. Patient is a smoker and has a history of COPD, not on oxygen at home. Patient's oxygen saturations was in the 70s in ER. He was placed on BiPAP for respiratory support. Work-up in the emergency room revealed temperature of 98.7, heart rate 110, respirations 28, blood pressure 148/72. Initial ABGs revealed pH of 7.25/73.2/74.3/32. 6. After BiPAP treatment repeat ABGs revealed pH 7.18, PCO2 85.5, PO2 70.4, bicarb 32.1. BiPAP settings were adjusted and subsequent ABG revealed improvement of PCO2. In addition, the patient was treated with IV steroids and was treated with multiple nebulizer treatments. Patient's transfer to [facility] was initiated in ER. Labs revealed unremarkable BMP with normal renal function, glucose 164, CRP was 323, , troponin high-sensitivity was 20, LFTs normal, WBC was elevated 15.6, H&H was normal 14.9/46.3, platelets 340. Portable chest x-ray revealed mild atelectasis or early infiltrates in lung bases, hyperhydration with chronic interstitial markings bilaterally. COVID-19 was negative. Patient also underwent CT chest without contrast for better evaluation of respiratory status and it revealed:      1. Marked emphysematous changes. 2. Mild lingular infiltrates versus atelectasis. 3. Scattered tiny nodular and patchy opacities; nonspecific. Follow-up CT    imaging in 3 months is recommended to document stability. 4. Mild mediastinal lymphadenopathy; some of this may be reactive, but there is    also evidence of chronic granulomatous disease. 5. Innumerable hypodense lesions throughout the liver suspected represent    cysts, or possibly cysts and hemangiomas. Consider nonemergent multiphase CT    imaging of the liver for confirmation and to exclude a mass.    6. Cholelithiasis.                         After spending more than 24 hours in the emergency room due to difficulty with transfer to higher level care facility (given current COVID pandemic crisis )the patient was deemed appropriate for admission to our facility since his respiratory status improved in ER. Patient's hospital course as follows:    1.  Acute exacerbation of COPD - improved with IV steroids, IV antibiotics, frequent aerosol treatments. Discharge home with oral steroids and Zithromax  2.  Acute hypoxemic hypercapnic respiratory failure secondary to #1 -required BiPAP in the emergency room, was able to be weaned off and this morning was assessed for home oxygen. Was 91% on room air at rest.  Patient also stated that he will not use oxygen at home even if he qualifies. 3.  Diabetes mellitus type 2, non-insulin-dependent -was on sliding scale insulin for anticipated steroid-induced hyperglycemia. Will discharge home with metformin. His hemoglobin A1c is 5.4%  4.  Pulmonary nodules per CT chest -  outpatient follow-up in 3 months recommended to document stability. CT chest ordered. 5.  Tobacco dependence -on nicotine patch  6. Moderate malnutrition -oral supplement added to diet  7. Innumerable hypodense lesions throughout the liver suspected to be secondary to cysts or possibly cyst and hemangioma. Nonemergent multiphase CT imaging of the liver was recommended.   CT abdomen and pelvis was done today and revealed multiple lesions throughout the liver favoring cysts      Disposition: home    Condition: Stable    Time Spent: 32 minutes      Electronically signed by Yesenia Hoang MD on 1/14/2022 at 5:43 AM  Discharging Hospitalist

## 2022-01-15 LAB
CULTURE: NORMAL
CULTURE: NORMAL
Lab: NORMAL
Lab: NORMAL
SPECIMEN DESCRIPTION: NORMAL
SPECIMEN DESCRIPTION: NORMAL

## 2022-12-06 ENCOUNTER — APPOINTMENT (OUTPATIENT)
Dept: GENERAL RADIOLOGY | Age: 77
End: 2022-12-06
Payer: MEDICARE

## 2022-12-06 ENCOUNTER — HOSPITAL ENCOUNTER (EMERGENCY)
Age: 77
Discharge: ANOTHER HOSPITAL | End: 2022-12-07
Attending: FAMILY MEDICINE | Admitting: INTERNAL MEDICINE
Payer: MEDICARE

## 2022-12-06 DIAGNOSIS — J96.21 ACUTE ON CHRONIC RESPIRATORY FAILURE WITH HYPOXIA AND HYPERCAPNIA (HCC): Primary | ICD-10-CM

## 2022-12-06 DIAGNOSIS — J18.9 PNEUMONIA OF BOTH LOWER LOBES DUE TO INFECTIOUS ORGANISM: ICD-10-CM

## 2022-12-06 DIAGNOSIS — Z87.09 HISTORY OF COPD: ICD-10-CM

## 2022-12-06 DIAGNOSIS — J96.22 ACUTE ON CHRONIC RESPIRATORY FAILURE WITH HYPOXIA AND HYPERCAPNIA (HCC): Primary | ICD-10-CM

## 2022-12-06 PROBLEM — J15.9 COMMUNITY ACQUIRED BACTERIAL PNEUMONIA: Status: ACTIVE | Noted: 2022-12-06

## 2022-12-06 LAB
-: ABNORMAL
ABSOLUTE EOS #: 0 K/UL (ref 0–0.4)
ABSOLUTE LYMPH #: 0.3 K/UL (ref 1–4.8)
ABSOLUTE MONO #: 0.8 K/UL (ref 0–1)
ALLEN TEST: ABNORMAL
ALLEN TEST: POSITIVE
ANION GAP SERPL CALCULATED.3IONS-SCNC: 5 MMOL/L (ref 9–17)
BACTERIA: ABNORMAL
BASOPHILS # BLD: 0 % (ref 0–2)
BASOPHILS ABSOLUTE: 0 K/UL (ref 0–0.2)
BILIRUBIN URINE: NEGATIVE
BUN BLDV-MCNC: 19 MG/DL (ref 8–23)
BUN/CREAT BLD: 31 (ref 9–20)
CALCIUM SERPL-MCNC: 9.8 MG/DL (ref 8.6–10.4)
CHLORIDE BLD-SCNC: 98 MMOL/L (ref 98–107)
CO2: 37 MMOL/L (ref 20–31)
COLOR: YELLOW
COMMENT UA: ABNORMAL
CREAT SERPL-MCNC: 0.61 MG/DL (ref 0.7–1.2)
DIFFERENTIAL TYPE: YES
EOSINOPHILS RELATIVE PERCENT: 0 % (ref 0–5)
EPITHELIAL CELLS UA: ABNORMAL /HPF
FIO2: 40
FIO2: 40
FIO2: 55
GFR SERPL CREATININE-BSD FRML MDRD: >60 ML/MIN/1.73M2
GLUCOSE BLD-MCNC: 147 MG/DL (ref 70–99)
GLUCOSE URINE: NEGATIVE
HCT VFR BLD CALC: 41.8 % (ref 41–53)
HEMOGLOBIN: 13.2 G/DL (ref 13.5–17.5)
KETONES, URINE: NEGATIVE
LACTIC ACID: 0.6 MMOL/L (ref 0.5–2.2)
LEUKOCYTE ESTERASE, URINE: NEGATIVE
LYMPHOCYTES # BLD: 3 % (ref 13–44)
MCH RBC QN AUTO: 26.3 PG (ref 26–34)
MCHC RBC AUTO-ENTMCNC: 31.5 G/DL (ref 31–37)
MCV RBC AUTO: 83.4 FL (ref 80–100)
MODE: ABNORMAL
MODE: AC
MONOCYTES # BLD: 7 % (ref 5–9)
NITRITE, URINE: NEGATIVE
O2 DEVICE/FLOW/%: ABNORMAL
PDW BLD-RTO: 15.9 % (ref 12.1–15.2)
PH UA: 8 (ref 5–8)
PLATELET # BLD: 280 K/UL (ref 140–450)
POC HCO3: 32.2 MMOL/L (ref 21–28)
POC HCO3: 38.7 MMOL/L (ref 21–28)
POC HCO3: 38.8 MMOL/L (ref 21–28)
POC HCO3: 39 MMOL/L (ref 21–28)
POC HCO3: 39 MMOL/L (ref 21–28)
POC O2 SATURATION: 91 % (ref 94–98)
POC O2 SATURATION: 94 % (ref 94–98)
POC O2 SATURATION: 97 % (ref 94–98)
POC O2 SATURATION: 98 % (ref 94–98)
POC O2 SATURATION: 98 % (ref 94–98)
POC PCO2: 52.5 MM HG (ref 35–48)
POC PCO2: 77.2 MM HG (ref 35–48)
POC PCO2: 79.7 MM HG (ref 35–48)
POC PCO2: 83.6 MM HG (ref 35–48)
POC PCO2: 84.4 MM HG (ref 35–48)
POC PH: 7.27 (ref 7.35–7.45)
POC PH: 7.27 (ref 7.35–7.45)
POC PH: 7.3 (ref 7.35–7.45)
POC PH: 7.31 (ref 7.35–7.45)
POC PH: 7.4 (ref 7.35–7.45)
POC PO2: 132.1 MM HG (ref 83–108)
POC PO2: 135.7 MM HG (ref 83–108)
POC PO2: 72.5 MM HG (ref 83–108)
POC PO2: 82 MM HG (ref 83–108)
POC PO2: 95.3 MM HG (ref 83–108)
POSITIVE BASE EXCESS, ART: 6 (ref 0–3)
POSITIVE BASE EXCESS, ART: 8 (ref 0–3)
POSITIVE BASE EXCESS, ART: 8 (ref 0–3)
POSITIVE BASE EXCESS, ART: 9 (ref 0–3)
POSITIVE BASE EXCESS, ART: 9 (ref 0–3)
POTASSIUM SERPL-SCNC: 4.4 MMOL/L (ref 3.7–5.3)
PRO-BNP: 443 PG/ML
PROTEIN UA: ABNORMAL
RBC # BLD: 5.01 M/UL (ref 4.5–5.9)
RBC UA: ABNORMAL /HPF (ref 0–2)
SAMPLE SITE: ABNORMAL
SARS-COV-2, RAPID: NOT DETECTED
SEG NEUTROPHILS: 90 % (ref 39–75)
SEGMENTED NEUTROPHILS ABSOLUTE COUNT: 9.7 K/UL (ref 2.1–6.5)
SODIUM BLD-SCNC: 140 MMOL/L (ref 135–144)
SPECIFIC GRAVITY UA: 1.01 (ref 1–1.03)
SPECIMEN DESCRIPTION: NORMAL
TROPONIN, HIGH SENSITIVITY: 11 NG/L (ref 0–22)
TURBIDITY: CLEAR
URINE HGB: NEGATIVE
UROBILINOGEN, URINE: NORMAL
WBC # BLD: 10.8 K/UL (ref 3.5–11)
WBC UA: ABNORMAL /HPF

## 2022-12-06 PROCEDURE — 2500000003 HC RX 250 WO HCPCS: Performed by: FAMILY MEDICINE

## 2022-12-06 PROCEDURE — 80048 BASIC METABOLIC PNL TOTAL CA: CPT

## 2022-12-06 PROCEDURE — 94640 AIRWAY INHALATION TREATMENT: CPT

## 2022-12-06 PROCEDURE — 1200000000 HC SEMI PRIVATE

## 2022-12-06 PROCEDURE — 36415 COLL VENOUS BLD VENIPUNCTURE: CPT

## 2022-12-06 PROCEDURE — 94002 VENT MGMT INPAT INIT DAY: CPT

## 2022-12-06 PROCEDURE — 87086 URINE CULTURE/COLONY COUNT: CPT

## 2022-12-06 PROCEDURE — 6360000002 HC RX W HCPCS: Performed by: FAMILY MEDICINE

## 2022-12-06 PROCEDURE — 81001 URINALYSIS AUTO W/SCOPE: CPT

## 2022-12-06 PROCEDURE — 87040 BLOOD CULTURE FOR BACTERIA: CPT

## 2022-12-06 PROCEDURE — 51702 INSERT TEMP BLADDER CATH: CPT

## 2022-12-06 PROCEDURE — 71045 X-RAY EXAM CHEST 1 VIEW: CPT

## 2022-12-06 PROCEDURE — 36600 WITHDRAWAL OF ARTERIAL BLOOD: CPT

## 2022-12-06 PROCEDURE — 93005 ELECTROCARDIOGRAM TRACING: CPT | Performed by: FAMILY MEDICINE

## 2022-12-06 PROCEDURE — 6370000000 HC RX 637 (ALT 250 FOR IP)

## 2022-12-06 PROCEDURE — 96365 THER/PROPH/DIAG IV INF INIT: CPT

## 2022-12-06 PROCEDURE — 6370000000 HC RX 637 (ALT 250 FOR IP): Performed by: FAMILY MEDICINE

## 2022-12-06 PROCEDURE — 87635 SARS-COV-2 COVID-19 AMP PRB: CPT

## 2022-12-06 PROCEDURE — 94664 DEMO&/EVAL PT USE INHALER: CPT

## 2022-12-06 PROCEDURE — 83605 ASSAY OF LACTIC ACID: CPT

## 2022-12-06 PROCEDURE — 96366 THER/PROPH/DIAG IV INF ADDON: CPT

## 2022-12-06 PROCEDURE — 31500 INSERT EMERGENCY AIRWAY: CPT

## 2022-12-06 PROCEDURE — 83880 ASSAY OF NATRIURETIC PEPTIDE: CPT

## 2022-12-06 PROCEDURE — 2580000003 HC RX 258: Performed by: FAMILY MEDICINE

## 2022-12-06 PROCEDURE — 6360000002 HC RX W HCPCS

## 2022-12-06 PROCEDURE — 85025 COMPLETE CBC W/AUTO DIFF WBC: CPT

## 2022-12-06 PROCEDURE — 99285 EMERGENCY DEPT VISIT HI MDM: CPT

## 2022-12-06 PROCEDURE — 84484 ASSAY OF TROPONIN QUANT: CPT

## 2022-12-06 PROCEDURE — 96375 TX/PRO/DX INJ NEW DRUG ADDON: CPT

## 2022-12-06 PROCEDURE — 82803 BLOOD GASES ANY COMBINATION: CPT

## 2022-12-06 PROCEDURE — C9803 HOPD COVID-19 SPEC COLLECT: HCPCS

## 2022-12-06 PROCEDURE — 96367 TX/PROPH/DG ADDL SEQ IV INF: CPT

## 2022-12-06 PROCEDURE — 94660 CPAP INITIATION&MGMT: CPT

## 2022-12-06 RX ORDER — LEVALBUTEROL INHALATION SOLUTION 1.25 MG/3ML
1.25 SOLUTION RESPIRATORY (INHALATION)
Status: CANCELLED | OUTPATIENT
Start: 2022-12-06

## 2022-12-06 RX ORDER — IPRATROPIUM BROMIDE AND ALBUTEROL SULFATE 2.5; .5 MG/3ML; MG/3ML
1 SOLUTION RESPIRATORY (INHALATION) ONCE
Status: COMPLETED | OUTPATIENT
Start: 2022-12-06 | End: 2022-12-06

## 2022-12-06 RX ORDER — ACETAMINOPHEN 325 MG/1
650 TABLET ORAL EVERY 6 HOURS PRN
Status: CANCELLED | OUTPATIENT
Start: 2022-12-06

## 2022-12-06 RX ORDER — SUCCINYLCHOLINE CHLORIDE 20 MG/ML
INJECTION INTRAMUSCULAR; INTRAVENOUS DAILY PRN
Status: COMPLETED | OUTPATIENT
Start: 2022-12-06 | End: 2022-12-06

## 2022-12-06 RX ORDER — ROSUVASTATIN CALCIUM 10 MG/1
10 TABLET, COATED ORAL DAILY
Status: CANCELLED | OUTPATIENT
Start: 2022-12-06

## 2022-12-06 RX ORDER — LEVOTHYROXINE SODIUM 112 UG/1
112 TABLET ORAL DAILY
COMMUNITY

## 2022-12-06 RX ORDER — INSULIN LISPRO 100 [IU]/ML
0-4 INJECTION, SOLUTION INTRAVENOUS; SUBCUTANEOUS NIGHTLY
Status: CANCELLED | OUTPATIENT
Start: 2022-12-06

## 2022-12-06 RX ORDER — POLYETHYLENE GLYCOL 3350 17 G/17G
17 POWDER, FOR SOLUTION ORAL DAILY PRN
Status: CANCELLED | OUTPATIENT
Start: 2022-12-06

## 2022-12-06 RX ORDER — MIDAZOLAM HYDROCHLORIDE 5 MG/ML
INJECTION INTRAMUSCULAR; INTRAVENOUS
Status: COMPLETED
Start: 2022-12-06 | End: 2022-12-06

## 2022-12-06 RX ORDER — CELECOXIB 100 MG/1
200 CAPSULE ORAL DAILY
Status: CANCELLED | OUTPATIENT
Start: 2022-12-06

## 2022-12-06 RX ORDER — ROSUVASTATIN CALCIUM 10 MG/1
10 TABLET, COATED ORAL
Status: ON HOLD | COMMUNITY
Start: 2022-10-16 | End: 2022-12-07

## 2022-12-06 RX ORDER — LORAZEPAM 2 MG/ML
1 INJECTION INTRAMUSCULAR ONCE
Status: COMPLETED | OUTPATIENT
Start: 2022-12-06 | End: 2022-12-06

## 2022-12-06 RX ORDER — ONDANSETRON 2 MG/ML
4 INJECTION INTRAMUSCULAR; INTRAVENOUS EVERY 6 HOURS PRN
Status: CANCELLED | OUTPATIENT
Start: 2022-12-06

## 2022-12-06 RX ORDER — METHYLPREDNISOLONE SODIUM SUCCINATE 125 MG/2ML
125 INJECTION, POWDER, LYOPHILIZED, FOR SOLUTION INTRAMUSCULAR; INTRAVENOUS ONCE
Status: COMPLETED | OUTPATIENT
Start: 2022-12-06 | End: 2022-12-06

## 2022-12-06 RX ORDER — ROSUVASTATIN CALCIUM 10 MG/1
10 TABLET, COATED ORAL DAILY
COMMUNITY

## 2022-12-06 RX ORDER — SODIUM CHLORIDE 0.9 % (FLUSH) 0.9 %
5-40 SYRINGE (ML) INJECTION PRN
Status: CANCELLED | OUTPATIENT
Start: 2022-12-06

## 2022-12-06 RX ORDER — SODIUM CHLORIDE 0.9 % (FLUSH) 0.9 %
5-40 SYRINGE (ML) INJECTION EVERY 12 HOURS SCHEDULED
Status: CANCELLED | OUTPATIENT
Start: 2022-12-06

## 2022-12-06 RX ORDER — CELECOXIB 200 MG/1
200 CAPSULE ORAL
Status: ON HOLD | COMMUNITY
Start: 2022-05-06 | End: 2022-12-07

## 2022-12-06 RX ORDER — ACETAMINOPHEN 650 MG/1
650 SUPPOSITORY RECTAL EVERY 6 HOURS PRN
Status: CANCELLED | OUTPATIENT
Start: 2022-12-06

## 2022-12-06 RX ORDER — LEVOTHYROXINE SODIUM 112 UG/1
112 TABLET ORAL DAILY
Status: CANCELLED | OUTPATIENT
Start: 2022-12-06

## 2022-12-06 RX ORDER — CELECOXIB 200 MG/1
200 CAPSULE ORAL DAILY
COMMUNITY

## 2022-12-06 RX ORDER — ETOMIDATE 2 MG/ML
INJECTION INTRAVENOUS DAILY PRN
Status: COMPLETED | OUTPATIENT
Start: 2022-12-06 | End: 2022-12-06

## 2022-12-06 RX ORDER — FLUTICASONE PROPIONATE AND SALMETEROL 500; 50 UG/1; UG/1
POWDER RESPIRATORY (INHALATION)
COMMUNITY
Start: 2022-10-18

## 2022-12-06 RX ORDER — SUCCINYLCHOLINE CHLORIDE 20 MG/ML
50 INJECTION INTRAMUSCULAR; INTRAVENOUS ONCE
Status: COMPLETED | OUTPATIENT
Start: 2022-12-06 | End: 2022-12-06

## 2022-12-06 RX ORDER — ONDANSETRON 4 MG/1
4 TABLET, ORALLY DISINTEGRATING ORAL EVERY 8 HOURS PRN
Status: CANCELLED | OUTPATIENT
Start: 2022-12-06

## 2022-12-06 RX ORDER — MIDAZOLAM HYDROCHLORIDE 2 MG/2ML
2 INJECTION, SOLUTION INTRAMUSCULAR; INTRAVENOUS ONCE
Status: COMPLETED | OUTPATIENT
Start: 2022-12-06 | End: 2022-12-06

## 2022-12-06 RX ORDER — GUAIFENESIN 600 MG/1
600 TABLET, EXTENDED RELEASE ORAL 2 TIMES DAILY
Status: CANCELLED | OUTPATIENT
Start: 2022-12-06

## 2022-12-06 RX ORDER — DEXTROSE MONOHYDRATE 100 MG/ML
INJECTION, SOLUTION INTRAVENOUS CONTINUOUS PRN
Status: CANCELLED | OUTPATIENT
Start: 2022-12-06

## 2022-12-06 RX ORDER — SODIUM CHLORIDE 9 MG/ML
INJECTION, SOLUTION INTRAVENOUS CONTINUOUS
Status: CANCELLED | OUTPATIENT
Start: 2022-12-06

## 2022-12-06 RX ORDER — TOPIRAMATE 50 MG/1
25 TABLET, FILM COATED ORAL 2 TIMES DAILY
Status: CANCELLED | OUTPATIENT
Start: 2022-12-06

## 2022-12-06 RX ORDER — TOPIRAMATE 25 MG/1
25 TABLET ORAL 2 TIMES DAILY
COMMUNITY

## 2022-12-06 RX ORDER — IPRATROPIUM BROMIDE AND ALBUTEROL SULFATE 2.5; .5 MG/3ML; MG/3ML
SOLUTION RESPIRATORY (INHALATION)
Status: COMPLETED
Start: 2022-12-06 | End: 2022-12-06

## 2022-12-06 RX ORDER — SODIUM CHLORIDE 9 MG/ML
INJECTION, SOLUTION INTRAVENOUS PRN
Status: CANCELLED | OUTPATIENT
Start: 2022-12-06

## 2022-12-06 RX ORDER — LEVOTHYROXINE SODIUM 112 UG/1
112 CAPSULE ORAL
Status: ON HOLD | COMMUNITY
Start: 2022-10-16 | End: 2022-12-07

## 2022-12-06 RX ORDER — INSULIN LISPRO 100 [IU]/ML
0-4 INJECTION, SOLUTION INTRAVENOUS; SUBCUTANEOUS
Status: CANCELLED | OUTPATIENT
Start: 2022-12-06

## 2022-12-06 RX ORDER — FENTANYL CITRATE 50 UG/ML
25 INJECTION, SOLUTION INTRAMUSCULAR; INTRAVENOUS ONCE
Status: COMPLETED | OUTPATIENT
Start: 2022-12-06 | End: 2022-12-06

## 2022-12-06 RX ORDER — ENOXAPARIN SODIUM 100 MG/ML
40 INJECTION SUBCUTANEOUS DAILY
Status: CANCELLED | OUTPATIENT
Start: 2022-12-06

## 2022-12-06 RX ORDER — METHYLPREDNISOLONE SODIUM SUCCINATE 125 MG/2ML
80 INJECTION, POWDER, LYOPHILIZED, FOR SOLUTION INTRAMUSCULAR; INTRAVENOUS EVERY 8 HOURS
Status: CANCELLED | OUTPATIENT
Start: 2022-12-06

## 2022-12-06 RX ADMIN — MIDAZOLAM 50 MG: 5 INJECTION INTRAMUSCULAR; INTRAVENOUS at 21:10

## 2022-12-06 RX ADMIN — FENTANYL CITRATE 50 MCG/HR: 50 INJECTION, SOLUTION INTRAMUSCULAR; INTRAVENOUS at 21:55

## 2022-12-06 RX ADMIN — ETOMIDATE 20 MG: 2 INJECTION, SOLUTION INTRAVENOUS at 21:02

## 2022-12-06 RX ADMIN — METHYLPREDNISOLONE SODIUM SUCCINATE 125 MG: 125 INJECTION, POWDER, FOR SOLUTION INTRAMUSCULAR; INTRAVENOUS at 11:18

## 2022-12-06 RX ADMIN — FENTANYL CITRATE 25 MCG: 50 INJECTION INTRAMUSCULAR; INTRAVENOUS at 21:33

## 2022-12-06 RX ADMIN — IPRATROPIUM BROMIDE AND ALBUTEROL SULFATE 1 AMPULE: 2.5; .5 SOLUTION RESPIRATORY (INHALATION) at 11:35

## 2022-12-06 RX ADMIN — MIDAZOLAM 2 MG: 1 INJECTION INTRAMUSCULAR; INTRAVENOUS at 21:42

## 2022-12-06 RX ADMIN — MIDAZOLAM HYDROCHLORIDE 50 MG: 5 INJECTION, SOLUTION INTRAMUSCULAR; INTRAVENOUS at 21:10

## 2022-12-06 RX ADMIN — CEFTRIAXONE SODIUM 1000 MG: 1 INJECTION, POWDER, FOR SOLUTION INTRAMUSCULAR; INTRAVENOUS at 13:00

## 2022-12-06 RX ADMIN — SUCCINYLCHOLINE CHLORIDE 50 MG: 20 INJECTION, SOLUTION INTRAMUSCULAR; INTRAVENOUS at 21:22

## 2022-12-06 RX ADMIN — LORAZEPAM 1 MG: 2 INJECTION, SOLUTION INTRAMUSCULAR; INTRAVENOUS at 14:12

## 2022-12-06 RX ADMIN — AZITHROMYCIN MONOHYDRATE 500 MG: 500 INJECTION, POWDER, LYOPHILIZED, FOR SOLUTION INTRAVENOUS at 13:20

## 2022-12-06 RX ADMIN — MIDAZOLAM HYDROCHLORIDE 3.2 MG/HR: 5 INJECTION, SOLUTION INTRAMUSCULAR; INTRAVENOUS at 21:13

## 2022-12-06 RX ADMIN — IPRATROPIUM BROMIDE AND ALBUTEROL SULFATE 1 AMPULE: 2.5; .5 SOLUTION RESPIRATORY (INHALATION) at 19:46

## 2022-12-06 RX ADMIN — SUCCINYLCHOLINE CHLORIDE 50 MG: 20 INJECTION, SOLUTION INTRAMUSCULAR; INTRAVENOUS at 21:02

## 2022-12-06 ASSESSMENT — PAIN - FUNCTIONAL ASSESSMENT: PAIN_FUNCTIONAL_ASSESSMENT: NONE - DENIES PAIN

## 2022-12-06 ASSESSMENT — PULMONARY FUNCTION TESTS: PIF_VALUE: 25

## 2022-12-06 ASSESSMENT — LIFESTYLE VARIABLES: HOW OFTEN DO YOU HAVE A DRINK CONTAINING ALCOHOL: NEVER

## 2022-12-06 NOTE — ED NOTES
ST ON MONITOR. FAMILY AWARE OF THE TIME FRAME OF BIPAP AND REPEAT ABG.      Maribel Razo RN  12/06/22 2312

## 2022-12-07 ENCOUNTER — APPOINTMENT (OUTPATIENT)
Dept: GENERAL RADIOLOGY | Age: 77
DRG: 871 | End: 2022-12-07
Attending: INTERNAL MEDICINE
Payer: MEDICARE

## 2022-12-07 ENCOUNTER — HOSPITAL ENCOUNTER (INPATIENT)
Age: 77
LOS: 5 days | Discharge: HOME HEALTH | DRG: 871 | End: 2022-12-12
Attending: INTERNAL MEDICINE | Admitting: INTERNAL MEDICINE
Payer: MEDICARE

## 2022-12-07 VITALS
RESPIRATION RATE: 16 BRPM | SYSTOLIC BLOOD PRESSURE: 86 MMHG | WEIGHT: 140 LBS | BODY MASS INDEX: 20.73 KG/M2 | HEART RATE: 61 BPM | OXYGEN SATURATION: 98 % | TEMPERATURE: 97.5 F | DIASTOLIC BLOOD PRESSURE: 62 MMHG | HEIGHT: 69 IN

## 2022-12-07 DIAGNOSIS — J15.9 COMMUNITY ACQUIRED BACTERIAL PNEUMONIA: Primary | ICD-10-CM

## 2022-12-07 PROBLEM — J96.00 ACUTE RESPIRATORY FAILURE (HCC): Status: ACTIVE | Noted: 2022-12-07

## 2022-12-07 LAB
ADENOVIRUS BY PCR: DETECTED
ALBUMIN SERPL-MCNC: 2.6 G/DL (ref 3.5–4.6)
ALP BLD-CCNC: 48 U/L (ref 35–104)
ALT SERPL-CCNC: <5 U/L (ref 0–41)
ANION GAP SERPL CALCULATED.3IONS-SCNC: 5 MEQ/L (ref 9–15)
AST SERPL-CCNC: 7 U/L (ref 0–40)
BACTERIA: NEGATIVE /HPF
BASE EXCESS ARTERIAL: 5 (ref -3–3)
BASOPHILS ABSOLUTE: 0 K/UL (ref 0–0.2)
BASOPHILS RELATIVE PERCENT: 0.3 %
BILIRUB SERPL-MCNC: <0.2 MG/DL (ref 0.2–0.7)
BILIRUBIN URINE: NEGATIVE
BLOOD, URINE: ABNORMAL
BORDETELLA PARAPERTUSSIS BY PCR: NOT DETECTED
BORDETELLA PERTUSSIS BY PCR: NOT DETECTED
BUN BLDV-MCNC: 25 MG/DL (ref 8–23)
CALCIUM IONIZED: 1.3 MMOL/L (ref 1.12–1.32)
CALCIUM SERPL-MCNC: 8.5 MG/DL (ref 8.5–9.9)
CHLAMYDOPHILIA PNEUMONIAE BY PCR: NOT DETECTED
CHLORIDE BLD-SCNC: 102 MEQ/L (ref 95–107)
CLARITY: ABNORMAL
CO2: 32 MEQ/L (ref 20–31)
COLOR: YELLOW
CORONAVIRUS 229E BY PCR: NOT DETECTED
CORONAVIRUS HKU1 BY PCR: NOT DETECTED
CORONAVIRUS NL63 BY PCR: NOT DETECTED
CORONAVIRUS OC43 BY PCR: NOT DETECTED
CREAT SERPL-MCNC: 0.44 MG/DL (ref 0.7–1.2)
CULTURE: NORMAL
EKG ATRIAL RATE: 113 BPM
EKG P AXIS: 94 DEGREES
EKG P-R INTERVAL: 202 MS
EKG Q-T INTERVAL: 320 MS
EKG QRS DURATION: 88 MS
EKG QTC CALCULATION (BAZETT): 438 MS
EKG R AXIS: 102 DEGREES
EKG T AXIS: 74 DEGREES
EKG VENTRICULAR RATE: 113 BPM
EOSINOPHILS ABSOLUTE: 0 K/UL (ref 0–0.7)
EOSINOPHILS RELATIVE PERCENT: 0 %
EPITHELIAL CELLS, UA: ABNORMAL /HPF (ref 0–5)
GFR SERPL CREATININE-BSD FRML MDRD: >60 ML/MIN/{1.73_M2}
GFR SERPL CREATININE-BSD FRML MDRD: >60 ML/MIN/{1.73_M2}
GLOBULIN: 3 G/DL (ref 2.3–3.5)
GLUCOSE BLD-MCNC: 104 MG/DL (ref 70–99)
GLUCOSE BLD-MCNC: 104 MG/DL (ref 70–99)
GLUCOSE BLD-MCNC: 105 MG/DL (ref 70–99)
GLUCOSE BLD-MCNC: 114 MG/DL (ref 70–99)
GLUCOSE BLD-MCNC: 114 MG/DL (ref 70–99)
GLUCOSE BLD-MCNC: 65 MG/DL (ref 70–99)
GLUCOSE BLD-MCNC: 70 MG/DL (ref 70–99)
GLUCOSE BLD-MCNC: 95 MG/DL (ref 70–99)
GLUCOSE URINE: NEGATIVE MG/DL
HBA1C MFR BLD: 6 % (ref 4.8–5.9)
HCO3 ARTERIAL: 31.6 MMOL/L (ref 21–29)
HCT VFR BLD CALC: 32 % (ref 42–52)
HEMOGLOBIN: 11.7 GM/DL (ref 13.5–17.5)
HEMOGLOBIN: 9.9 G/DL (ref 14–18)
HUMAN METAPNEUMOVIRUS BY PCR: NOT DETECTED
HUMAN RHINOVIRUS/ENTEROVIRUS BY PCR: NOT DETECTED
HYALINE CASTS: ABNORMAL /HPF (ref 0–5)
HYALINE CASTS: ABNORMAL /LPF (ref 0–5)
INFLUENZA A BY PCR: NOT DETECTED
INFLUENZA B BY PCR: NOT DETECTED
KETONES, URINE: NEGATIVE MG/DL
LACTATE: 0.47 MMOL/L (ref 0.4–2)
LACTIC ACID: 0.9 MMOL/L (ref 0.5–2.2)
LEUKOCYTE ESTERASE, URINE: NEGATIVE
LYMPHOCYTES ABSOLUTE: 0.5 K/UL (ref 1–4.8)
LYMPHOCYTES RELATIVE PERCENT: 9 %
MCH RBC QN AUTO: 25.9 PG (ref 27–31.3)
MCHC RBC AUTO-ENTMCNC: 30.8 % (ref 33–37)
MCV RBC AUTO: 83.9 FL (ref 79–92.2)
MONOCYTES ABSOLUTE: 0.7 K/UL (ref 0.2–0.8)
MONOCYTES RELATIVE PERCENT: 12.1 %
MYCOPLASMA PNEUMONIAE BY PCR: NOT DETECTED
NEUTROPHILS ABSOLUTE: 4.5 K/UL (ref 1.4–6.5)
NEUTROPHILS RELATIVE PERCENT: 78.6 %
NITRITE, URINE: NEGATIVE
O2 SAT, ARTERIAL: 98 % (ref 93–100)
PARAINFLUENZA VIRUS 1 BY PCR: NOT DETECTED
PARAINFLUENZA VIRUS 2 BY PCR: NOT DETECTED
PARAINFLUENZA VIRUS 3 BY PCR: NOT DETECTED
PARAINFLUENZA VIRUS 4 BY PCR: NOT DETECTED
PCO2 ARTERIAL: 67 MM HG (ref 35–45)
PDW BLD-RTO: 15.4 % (ref 11.5–14.5)
PERFORMED ON: ABNORMAL
PERFORMED ON: NORMAL
PERFORMED ON: NORMAL
PH ARTERIAL: 7.28 (ref 7.35–7.45)
PH UA: 5.5 (ref 5–9)
PLATELET # BLD: 192 K/UL (ref 130–400)
PO2 ARTERIAL: 131 MM HG (ref 75–108)
POC CHLORIDE: 102 MEQ/L (ref 99–110)
POC CREATININE: 0.6 MG/DL (ref 0.8–1.3)
POC FIO2: 50
POC HEMATOCRIT: 35 % (ref 41–53)
POC POTASSIUM: 4.3 MEQ/L (ref 3.5–5.1)
POC SAMPLE TYPE: ABNORMAL
POC SODIUM: 141 MEQ/L (ref 136–145)
POTASSIUM REFLEX MAGNESIUM: 4.2 MEQ/L (ref 3.4–4.9)
POTASSIUM SERPL-SCNC: 4.2 MEQ/L (ref 3.4–4.9)
PROCALCITONIN: 0.08 NG/ML (ref 0–0.15)
PROTEIN UA: ABNORMAL MG/DL
RBC # BLD: 3.81 M/UL (ref 4.7–6.1)
RBC UA: ABNORMAL /HPF (ref 0–5)
RESPIRATORY SYNCYTIAL VIRUS BY PCR: DETECTED
SARS-COV-2, PCR: NOT DETECTED
SODIUM BLD-SCNC: 139 MEQ/L (ref 135–144)
SPECIFIC GRAVITY UA: 1.02 (ref 1–1.03)
SPECIMEN DESCRIPTION: NORMAL
TCO2 ARTERIAL: 34 MMOL/L (ref 21–32)
TOTAL PROTEIN: 5.6 G/DL (ref 6.3–8)
TROPONIN: <0.01 NG/ML (ref 0–0.01)
UROBILINOGEN, URINE: 0.2 E.U./DL
WBC # BLD: 5.7 K/UL (ref 4.8–10.8)
WBC UA: ABNORMAL /HPF (ref 0–5)

## 2022-12-07 PROCEDURE — 82435 ASSAY OF BLOOD CHLORIDE: CPT

## 2022-12-07 PROCEDURE — 93306 TTE W/DOPPLER COMPLETE: CPT

## 2022-12-07 PROCEDURE — 87449 NOS EACH ORGANISM AG IA: CPT

## 2022-12-07 PROCEDURE — 94002 VENT MGMT INPAT INIT DAY: CPT

## 2022-12-07 PROCEDURE — 6370000000 HC RX 637 (ALT 250 FOR IP): Performed by: INTERNAL MEDICINE

## 2022-12-07 PROCEDURE — 81001 URINALYSIS AUTO W/SCOPE: CPT

## 2022-12-07 PROCEDURE — 82565 ASSAY OF CREATININE: CPT

## 2022-12-07 PROCEDURE — 71045 X-RAY EXAM CHEST 1 VIEW: CPT

## 2022-12-07 PROCEDURE — 84295 ASSAY OF SERUM SODIUM: CPT

## 2022-12-07 PROCEDURE — 2580000003 HC RX 258: Performed by: INTERNAL MEDICINE

## 2022-12-07 PROCEDURE — 84145 PROCALCITONIN (PCT): CPT

## 2022-12-07 PROCEDURE — 87086 URINE CULTURE/COLONY COUNT: CPT

## 2022-12-07 PROCEDURE — 93010 ELECTROCARDIOGRAM REPORT: CPT | Performed by: INTERNAL MEDICINE

## 2022-12-07 PROCEDURE — 94761 N-INVAS EAR/PLS OXIMETRY MLT: CPT

## 2022-12-07 PROCEDURE — 2500000003 HC RX 250 WO HCPCS: Performed by: INTERNAL MEDICINE

## 2022-12-07 PROCEDURE — 82803 BLOOD GASES ANY COMBINATION: CPT

## 2022-12-07 PROCEDURE — 84132 ASSAY OF SERUM POTASSIUM: CPT

## 2022-12-07 PROCEDURE — 2000000000 HC ICU R&B

## 2022-12-07 PROCEDURE — 99291 CRITICAL CARE FIRST HOUR: CPT | Performed by: INTERNAL MEDICINE

## 2022-12-07 PROCEDURE — 0BH17EZ INSERTION OF ENDOTRACHEAL AIRWAY INTO TRACHEA, VIA NATURAL OR ARTIFICIAL OPENING: ICD-10-PCS | Performed by: RADIOLOGY

## 2022-12-07 PROCEDURE — 5A1945Z RESPIRATORY VENTILATION, 24-96 CONSECUTIVE HOURS: ICD-10-PCS | Performed by: RADIOLOGY

## 2022-12-07 PROCEDURE — 84484 ASSAY OF TROPONIN QUANT: CPT

## 2022-12-07 PROCEDURE — 2580000003 HC RX 258

## 2022-12-07 PROCEDURE — 87899 AGENT NOS ASSAY W/OPTIC: CPT

## 2022-12-07 PROCEDURE — 89220 SPUTUM SPECIMEN COLLECTION: CPT

## 2022-12-07 PROCEDURE — 83605 ASSAY OF LACTIC ACID: CPT

## 2022-12-07 PROCEDURE — A4216 STERILE WATER/SALINE, 10 ML: HCPCS | Performed by: INTERNAL MEDICINE

## 2022-12-07 PROCEDURE — 83036 HEMOGLOBIN GLYCOSYLATED A1C: CPT

## 2022-12-07 PROCEDURE — 87070 CULTURE OTHR SPECIMN AEROBIC: CPT

## 2022-12-07 PROCEDURE — 36415 COLL VENOUS BLD VENIPUNCTURE: CPT

## 2022-12-07 PROCEDURE — 6360000002 HC RX W HCPCS: Performed by: INTERNAL MEDICINE

## 2022-12-07 PROCEDURE — 36600 WITHDRAWAL OF ARTERIAL BLOOD: CPT

## 2022-12-07 PROCEDURE — 80053 COMPREHEN METABOLIC PANEL: CPT

## 2022-12-07 PROCEDURE — 82948 REAGENT STRIP/BLOOD GLUCOSE: CPT

## 2022-12-07 PROCEDURE — 94660 CPAP INITIATION&MGMT: CPT

## 2022-12-07 PROCEDURE — 85014 HEMATOCRIT: CPT

## 2022-12-07 PROCEDURE — 85025 COMPLETE CBC W/AUTO DIFF WBC: CPT

## 2022-12-07 PROCEDURE — 0202U NFCT DS 22 TRGT SARS-COV-2: CPT

## 2022-12-07 PROCEDURE — 6360000002 HC RX W HCPCS

## 2022-12-07 PROCEDURE — 82330 ASSAY OF CALCIUM: CPT

## 2022-12-07 PROCEDURE — 2500000003 HC RX 250 WO HCPCS

## 2022-12-07 PROCEDURE — 93005 ELECTROCARDIOGRAM TRACING: CPT | Performed by: INTERNAL MEDICINE

## 2022-12-07 RX ORDER — POLYETHYLENE GLYCOL 3350 17 G/17G
17 POWDER, FOR SOLUTION ORAL DAILY PRN
Status: DISCONTINUED | OUTPATIENT
Start: 2022-12-07 | End: 2022-12-12 | Stop reason: HOSPADM

## 2022-12-07 RX ORDER — ATROPINE SULFATE 0.1 MG/ML
0.5 INJECTION INTRAVENOUS ONCE
Status: COMPLETED | OUTPATIENT
Start: 2022-12-07 | End: 2022-12-07

## 2022-12-07 RX ORDER — ONDANSETRON 2 MG/ML
4 INJECTION INTRAMUSCULAR; INTRAVENOUS EVERY 6 HOURS PRN
Status: DISCONTINUED | OUTPATIENT
Start: 2022-12-07 | End: 2022-12-12 | Stop reason: HOSPADM

## 2022-12-07 RX ORDER — SODIUM CHLORIDE, SODIUM LACTATE, POTASSIUM CHLORIDE, AND CALCIUM CHLORIDE .6; .31; .03; .02 G/100ML; G/100ML; G/100ML; G/100ML
500 INJECTION, SOLUTION INTRAVENOUS ONCE
Status: COMPLETED | OUTPATIENT
Start: 2022-12-07 | End: 2022-12-07

## 2022-12-07 RX ORDER — ONDANSETRON 4 MG/1
4 TABLET, ORALLY DISINTEGRATING ORAL EVERY 8 HOURS PRN
Status: DISCONTINUED | OUTPATIENT
Start: 2022-12-07 | End: 2022-12-12 | Stop reason: HOSPADM

## 2022-12-07 RX ORDER — METHYLPREDNISOLONE SODIUM SUCCINATE 40 MG/ML
40 INJECTION, POWDER, LYOPHILIZED, FOR SOLUTION INTRAMUSCULAR; INTRAVENOUS DAILY
Status: DISCONTINUED | OUTPATIENT
Start: 2022-12-07 | End: 2022-12-10

## 2022-12-07 RX ORDER — ACETAMINOPHEN 325 MG/1
650 TABLET ORAL EVERY 6 HOURS PRN
Status: DISCONTINUED | OUTPATIENT
Start: 2022-12-07 | End: 2022-12-12 | Stop reason: HOSPADM

## 2022-12-07 RX ORDER — PROPOFOL 10 MG/ML
5-50 INJECTION, EMULSION INTRAVENOUS CONTINUOUS
Status: DISCONTINUED | OUTPATIENT
Start: 2022-12-07 | End: 2022-12-09

## 2022-12-07 RX ORDER — SODIUM CHLORIDE, SODIUM LACTATE, POTASSIUM CHLORIDE, AND CALCIUM CHLORIDE .6; .31; .03; .02 G/100ML; G/100ML; G/100ML; G/100ML
1000 INJECTION, SOLUTION INTRAVENOUS ONCE
Status: COMPLETED | OUTPATIENT
Start: 2022-12-07 | End: 2022-12-07

## 2022-12-07 RX ORDER — SODIUM CHLORIDE, SODIUM LACTATE, POTASSIUM CHLORIDE, CALCIUM CHLORIDE 600; 310; 30; 20 MG/100ML; MG/100ML; MG/100ML; MG/100ML
INJECTION, SOLUTION INTRAVENOUS
Status: COMPLETED
Start: 2022-12-07 | End: 2022-12-07

## 2022-12-07 RX ORDER — ROSUVASTATIN CALCIUM 20 MG/1
10 TABLET, COATED ORAL DAILY
Status: DISCONTINUED | OUTPATIENT
Start: 2022-12-07 | End: 2022-12-12 | Stop reason: HOSPADM

## 2022-12-07 RX ORDER — CHLORHEXIDINE GLUCONATE 0.12 MG/ML
15 RINSE ORAL 2 TIMES DAILY
Status: DISCONTINUED | OUTPATIENT
Start: 2022-12-07 | End: 2022-12-09

## 2022-12-07 RX ORDER — INSULIN LISPRO 100 [IU]/ML
0-4 INJECTION, SOLUTION INTRAVENOUS; SUBCUTANEOUS EVERY 4 HOURS
Status: DISCONTINUED | OUTPATIENT
Start: 2022-12-07 | End: 2022-12-10

## 2022-12-07 RX ORDER — ACETAMINOPHEN 650 MG/1
650 SUPPOSITORY RECTAL EVERY 6 HOURS PRN
Status: DISCONTINUED | OUTPATIENT
Start: 2022-12-07 | End: 2022-12-12 | Stop reason: HOSPADM

## 2022-12-07 RX ORDER — SODIUM CHLORIDE 0.9 % (FLUSH) 0.9 %
5-40 SYRINGE (ML) INJECTION EVERY 12 HOURS SCHEDULED
Status: DISCONTINUED | OUTPATIENT
Start: 2022-12-07 | End: 2022-12-12 | Stop reason: HOSPADM

## 2022-12-07 RX ORDER — SODIUM CHLORIDE 9 MG/ML
INJECTION, SOLUTION INTRAVENOUS PRN
Status: DISCONTINUED | OUTPATIENT
Start: 2022-12-07 | End: 2022-12-12 | Stop reason: HOSPADM

## 2022-12-07 RX ORDER — LIDOCAINE HYDROCHLORIDE 20 MG/ML
5 INJECTION, SOLUTION INFILTRATION; PERINEURAL ONCE
Status: DISCONTINUED | OUTPATIENT
Start: 2022-12-07 | End: 2022-12-12 | Stop reason: HOSPADM

## 2022-12-07 RX ORDER — ATROPINE SULFATE 0.1 MG/ML
INJECTION INTRAVENOUS
Status: COMPLETED
Start: 2022-12-07 | End: 2022-12-07

## 2022-12-07 RX ORDER — ENOXAPARIN SODIUM 100 MG/ML
40 INJECTION SUBCUTANEOUS DAILY
Status: DISCONTINUED | OUTPATIENT
Start: 2022-12-07 | End: 2022-12-12 | Stop reason: HOSPADM

## 2022-12-07 RX ORDER — SODIUM CHLORIDE 0.9 % (FLUSH) 0.9 %
5-40 SYRINGE (ML) INJECTION PRN
Status: DISCONTINUED | OUTPATIENT
Start: 2022-12-07 | End: 2022-12-12 | Stop reason: HOSPADM

## 2022-12-07 RX ORDER — NICOTINE 21 MG/24HR
1 PATCH, TRANSDERMAL 24 HOURS TRANSDERMAL DAILY
Status: DISCONTINUED | OUTPATIENT
Start: 2022-12-07 | End: 2022-12-12 | Stop reason: HOSPADM

## 2022-12-07 RX ORDER — PROPOFOL 10 MG/ML
INJECTION, EMULSION INTRAVENOUS
Status: DISPENSED
Start: 2022-12-07 | End: 2022-12-07

## 2022-12-07 RX ORDER — SODIUM CHLORIDE 9 MG/ML
250 INJECTION, SOLUTION INTRAVENOUS ONCE
Status: DISCONTINUED | OUTPATIENT
Start: 2022-12-07 | End: 2022-12-10

## 2022-12-07 RX ORDER — LEVOTHYROXINE SODIUM 112 UG/1
112 TABLET ORAL DAILY
Status: DISCONTINUED | OUTPATIENT
Start: 2022-12-07 | End: 2022-12-12 | Stop reason: HOSPADM

## 2022-12-07 RX ORDER — MIDODRINE HYDROCHLORIDE 5 MG/1
10 TABLET ORAL
Status: DISCONTINUED | OUTPATIENT
Start: 2022-12-07 | End: 2022-12-12

## 2022-12-07 RX ORDER — DEXTROSE MONOHYDRATE 100 MG/ML
INJECTION, SOLUTION INTRAVENOUS CONTINUOUS PRN
Status: DISCONTINUED | OUTPATIENT
Start: 2022-12-07 | End: 2022-12-12 | Stop reason: HOSPADM

## 2022-12-07 RX ADMIN — PROPOFOL 20 MCG/KG/MIN: 10 INJECTION, EMULSION INTRAVENOUS at 03:15

## 2022-12-07 RX ADMIN — SODIUM CHLORIDE, POTASSIUM CHLORIDE, SODIUM LACTATE AND CALCIUM CHLORIDE 500 ML: 600; 310; 30; 20 INJECTION, SOLUTION INTRAVENOUS at 16:49

## 2022-12-07 RX ADMIN — SODIUM CHLORIDE, POTASSIUM CHLORIDE, SODIUM LACTATE AND CALCIUM CHLORIDE 500 ML: 600; 310; 30; 20 INJECTION, SOLUTION INTRAVENOUS at 17:53

## 2022-12-07 RX ADMIN — ATROPINE SULFATE 0.5 MG: 0.1 INJECTION INTRAVENOUS at 04:48

## 2022-12-07 RX ADMIN — METHYLPREDNISOLONE SODIUM SUCCINATE 40 MG: 40 INJECTION, POWDER, FOR SOLUTION INTRAMUSCULAR; INTRAVENOUS at 07:37

## 2022-12-07 RX ADMIN — Medication 5 MCG/MIN: at 14:47

## 2022-12-07 RX ADMIN — PIPERACILLIN AND TAZOBACTAM 4500 MG: 4; .5 INJECTION, POWDER, LYOPHILIZED, FOR SOLUTION INTRAVENOUS at 09:51

## 2022-12-07 RX ADMIN — FAMOTIDINE 20 MG: 10 INJECTION INTRAVENOUS at 07:37

## 2022-12-07 RX ADMIN — 0.12% CHLORHEXIDINE GLUCONATE 15 ML: 1.2 RINSE ORAL at 20:52

## 2022-12-07 RX ADMIN — FENTANYL CITRATE 50 MCG/HR: 0.05 INJECTION, SOLUTION INTRAMUSCULAR; INTRAVENOUS at 12:58

## 2022-12-07 RX ADMIN — ROSUVASTATIN CALCIUM 10 MG: 20 TABLET, FILM COATED ORAL at 11:12

## 2022-12-07 RX ADMIN — Medication 10 ML: at 20:53

## 2022-12-07 RX ADMIN — Medication 8 MCG/MIN: at 15:16

## 2022-12-07 RX ADMIN — SODIUM CHLORIDE, POTASSIUM CHLORIDE, SODIUM LACTATE AND CALCIUM CHLORIDE 1000 ML: 600; 310; 30; 20 INJECTION, SOLUTION INTRAVENOUS at 03:15

## 2022-12-07 RX ADMIN — ATROPINE SULFATE 0.5 MG: 0.1 INJECTION INTRAVENOUS at 04:28

## 2022-12-07 RX ADMIN — MIDODRINE HYDROCHLORIDE 10 MG: 5 TABLET ORAL at 11:03

## 2022-12-07 RX ADMIN — 0.12% CHLORHEXIDINE GLUCONATE 15 ML: 1.2 RINSE ORAL at 07:36

## 2022-12-07 RX ADMIN — SODIUM CHLORIDE, POTASSIUM CHLORIDE, SODIUM LACTATE AND CALCIUM CHLORIDE 1000 ML: 600; 310; 30; 20 INJECTION, SOLUTION INTRAVENOUS at 04:23

## 2022-12-07 RX ADMIN — PIPERACILLIN AND TAZOBACTAM 3375 MG: 3; .375 INJECTION, POWDER, FOR SOLUTION INTRAVENOUS at 18:08

## 2022-12-07 RX ADMIN — FAMOTIDINE 20 MG: 10 INJECTION INTRAVENOUS at 20:52

## 2022-12-07 RX ADMIN — ATROPINE SULFATE 0.5 MG: 0.1 INJECTION, SOLUTION ENDOTRACHEAL; INTRAMUSCULAR; INTRAVENOUS; SUBCUTANEOUS at 04:28

## 2022-12-07 RX ADMIN — SODIUM CHLORIDE, SODIUM LACTATE, POTASSIUM CHLORIDE, AND CALCIUM CHLORIDE 1000 ML: .6; .31; .03; .02 INJECTION, SOLUTION INTRAVENOUS at 03:15

## 2022-12-07 RX ADMIN — DOCUSATE SODIUM 100 MG: 50 LIQUID ORAL at 11:03

## 2022-12-07 RX ADMIN — MIDODRINE HYDROCHLORIDE 10 MG: 5 TABLET ORAL at 16:50

## 2022-12-07 RX ADMIN — ATROPINE SULFATE 0.5 MG: 0.1 INJECTION, SOLUTION ENDOTRACHEAL; INTRAMUSCULAR; INTRAVENOUS; SUBCUTANEOUS at 04:48

## 2022-12-07 RX ADMIN — DOCUSATE SODIUM 100 MG: 50 LIQUID ORAL at 20:52

## 2022-12-07 RX ADMIN — LEVOTHYROXINE SODIUM 112 MCG: 112 TABLET ORAL at 11:12

## 2022-12-07 RX ADMIN — ENOXAPARIN SODIUM 40 MG: 100 INJECTION SUBCUTANEOUS at 07:36

## 2022-12-07 ASSESSMENT — PULMONARY FUNCTION TESTS
PIF_VALUE: 20
PIF_VALUE: 26
PIF_VALUE: 15
PIF_VALUE: 25
PIF_VALUE: 31
PIF_VALUE: 26
PIF_VALUE: 20
PIF_VALUE: 25
PIF_VALUE: 24
PIF_VALUE: 25
PIF_VALUE: 19
PIF_VALUE: 20
PIF_VALUE: 19
PIF_VALUE: 28
PIF_VALUE: 11
PIF_VALUE: 24
PIF_VALUE: 30
PIF_VALUE: 19
PIF_VALUE: 25
PIF_VALUE: 19
PIF_VALUE: 12
PIF_VALUE: 31
PIF_VALUE: 24
PIF_VALUE: 18
PIF_VALUE: 27
PIF_VALUE: 31
PIF_VALUE: 17
PIF_VALUE: 19
PIF_VALUE: 26
PIF_VALUE: 26
PIF_VALUE: 20
PIF_VALUE: 24
PIF_VALUE: 19
PIF_VALUE: 25
PIF_VALUE: 28
PIF_VALUE: 24
PIF_VALUE: 26
PIF_VALUE: 25
PIF_VALUE: 12
PIF_VALUE: 26
PIF_VALUE: 25
PIF_VALUE: 17
PIF_VALUE: 18
PIF_VALUE: 17
PIF_VALUE: 20
PIF_VALUE: 13
PIF_VALUE: 18
PIF_VALUE: 24
PIF_VALUE: 26
PIF_VALUE: 27
PIF_VALUE: 31
PIF_VALUE: 19
PIF_VALUE: 18
PIF_VALUE: 27
PIF_VALUE: 26
PIF_VALUE: 16
PIF_VALUE: 25
PIF_VALUE: 18
PIF_VALUE: 21
PIF_VALUE: 19
PIF_VALUE: 26
PIF_VALUE: 25
PIF_VALUE: 27
PIF_VALUE: 25
PIF_VALUE: 27
PIF_VALUE: 20
PIF_VALUE: 20
PIF_VALUE: 25
PIF_VALUE: 25
PIF_VALUE: 23
PIF_VALUE: 34
PIF_VALUE: 20
PIF_VALUE: 20
PIF_VALUE: 27
PIF_VALUE: 20
PIF_VALUE: 24
PIF_VALUE: 19
PIF_VALUE: 27
PIF_VALUE: 20
PIF_VALUE: 29
PIF_VALUE: 20
PIF_VALUE: 11
PIF_VALUE: 20
PIF_VALUE: 19
PIF_VALUE: 24

## 2022-12-07 ASSESSMENT — ENCOUNTER SYMPTOMS: SHORTNESS OF BREATH: 1

## 2022-12-07 NOTE — H&P
Hospital Medicine  History and Physical    Patient:  Migdalia Verduzco  MRN: [id number]    CHIEF COMPLAINT:  hypoxia    History Obtained From:  patient, electronic medical record  Primary Care Physician: Mando Worrell MD    HISTORY OF PRESENT ILLNESS:   The patient is a 78 y.o. male transferred to [facility] from hospital for hypoxic respiratory failure. Patient 63-year-old male with known history of COPD BiPAP dependence initially presented emergency department hypercapnic. Patient failed to improve with aggressive BiPAP settings and was intubated in outside hospital and transferred to [facility] ICU for further care. Per chart review patient has a history of hypothyroid disease hyperlipidemia type 2 diabetes disease and COPD. Patient is afebrile but hypotensive on arrival me arterial pressure by 58. Patient arrives on assist-control tidal volume 450 PEEP of 5, 40% FiO2 rate of 16. Patient was given 1 L LR bolus. Sherman catheter in place patient is minimally responsive. Base metabolic panel benign as is the CBC on arrival    Past Medical History:      Diagnosis Date    COPD (chronic obstructive pulmonary disease) ([facility] [address]     Diabetes mellitus ([facility] [address]        Past Surgical History:      Procedure Laterality Date    BRAIN SURGERY         Medications Prior to Admission:    Prior to Admission medications    Medication Sig Start Date End Date Taking?  Authorizing Provider   levothyroxine (SYNTHROID) 112 MCG tablet Take 112 mcg by mouth Daily    Historical Provider, MD   topiramate (TOPAMAX) 25 MG tablet Take 25 mg by mouth 2 times daily    Historical Provider, MD   rosuvastatin (CRESTOR) 10 MG tablet Take 10 mg by mouth daily    Historical Provider, MD   celecoxib (CELEBREX) 200 MG capsule Take 200 mg by mouth daily    Historical Provider, MD   Levothyroxine Sodium 112 MCG CAPS Take 112 mcg by mouth 10/16/22   Historical Provider, MD   tiotropium (SPIRIVA RESPIMAT) 2.5 MCG/ACT AERS inhaler   = 2 puff(s), Inhalation, Daily, X 30 day(s), # 1 EA, Refills(s) 11, [facility] 178, cm, 11/02/22 11:42:00 EDT, Height/Length Dosing, 61.4, kg, 11/02/22 11:42:00 EDT, Weight Dosing 11/2/22 10/28/23  Historical Provider, MD   rosuvastatin (CRESTOR) 10 MG tablet Take 10 mg by mouth 10/16/22   Historical Provider, MD   fluticasone-salmeterol (ADVAIR) 500-50 MCG/ACT AEPB diskus inhaler INHALE 1 PUFF TWICE DAILY 10/18/22   Historical Provider, MD   celecoxib (CELEBREX) 200 MG capsule Take 200 mg by mouth 5/6/22   Historical Provider, MD   albuterol (PROVENTIL) (2.5 MG/3ML) 0.083% nebulizer solution Take 3 mLs by nebulization every 2 hours as needed for Wheezing 1/14/22   Lionel Arana MD   budesonide-formoterol (SYMBICORT) 160-4.5 MCG/ACT AERO Inhale 2 puffs into the lungs 2 times daily 1/14/22   Lionel Arana MD   guaiFENesin (MUCINEX) 600 MG extended release tablet Take 1 tablet by mouth 2 times daily 1/14/22   Lionel Arana MD   metFORMIN (GLUCOPHAGE) 500 MG tablet Take 1,000 mg by mouth daily (with breakfast)    Historical Provider, MD   naproxen (NAPROSYN) 375 MG tablet Take 1 tablet by mouth 2 times daily (with meals) 9/3/21   Yanelis Shirley MD       Allergies:  Patient has no known allergies. Social History:   TOBACCO:   reports that he has been smoking cigarettes. He has been smoking an average of 1 pack per day. He has never used smokeless tobacco.  ETOH:   reports no history of alcohol use. OCCUPATION: Unknown unable to assess  Family History:   No family history on file. REVIEW OF SYSTEMS:  Unable to obtain ROS    Physical Exam:    Vitals: BP (!) 75/53   Pulse 62   Temp 97.8 °F (36.6 °C) (Oral)   Resp 16   SpO2 100%   Constitutional: alert, appears stated age and cooperative intubated and sedated  Skin: Skin color, texture, turgor normal. No rashes or lesions  Eyes:Eye: Normal external eye, conjunctiva, JUAN ALBERTO.   ENT: Head: Normocephalic, no lesions, without obvious abnormality. Neck: no adenopathy, no carotid bruit, no JVD, supple, symmetrical, trachea midline and thyroid not enlarged, symmetric, no tenderness/mass/nodules  Respiratory: clear to auscultation bilaterally no wheezes rales or rhonchi  Cardiovascular: regular rate and rhythm, S1, S2 normal, no murmur, click, rub or gallop  Gastrointestinal: soft, non-tender; bowel sounds normal; no masses,  no organomegaly  Genitourinary: Deferred  Musculoskeletal:extremities normal, atraumatic, no cyanosis or edema  Neurologic: Mental status intubated sedated no focal deficits  Psychiatric: Unable to assess psychiatric status  Hematologic: No obvious bruising or bleeding    Recent Labs     12/06/22  1115   WBC 10.8   HGB 13.2*        Recent Labs     12/06/22  1130      K 4.4   CL 98   CO2 37*   BUN 19   CREATININE 0.61*   GLUCOSE 147*     Recent Labs     12/06/22  1348   COLORU Yellow   PHUR 8.0   WBCUA 0 TO 2   RBCUA 2 TO 5   BACTERIA 3+*   SPECGRAV 1.010   LEUKOCYTESUR NEGATIVE   UROBILINOGEN Normal   BILIRUBINUR NEGATIVE   GLUCOSEU NEGATIVE         Assessment and Plan   Acute on chronic hypoxic hypercapnic respiratory failure: Empirically start Solu-Medrol wean trial in a.m. consult to pulmonology. Respiratory viral panel, urine strep and Legionella antigen ordered. Sputum culture ordered.   Pepcid for GI prophylaxis  Lovenox for DVT prophylaxis  Sliding scale insulin coverage for DM2  Hypothyroid disease start Synthroid  hyperlipidemia: Crestor    Patient Active Problem List   Diagnosis Code    COPD exacerbation ([facility] [address] J44.1    Moderate malnutrition ([address] E44.0    Community acquired bacterial pneumonia J15.9    Acute respiratory failure ([address] J96.00       Alexandria Harvey DO  Admitting Hospitalist    Emergency Contact:

## 2022-12-07 NOTE — ED PROVIDER NOTES
[address] [facility]  eMERGENCY dEPARTMENT eNCOUnter          [address] [facility]       Chief Complaint   Patient presents with    Shortness of Breath     Lethargy and increased sob since Saturday night. Nurses Notes reviewed and I agree except as noted in the HPI. HISTORY OF PRESENT ILLNESS    Daniela Ludwig is a 78 y.o. male who presents to the emergency room via private vehicle with family \"who indicate patient's been increasingly bright for the past 2 to 3 days, with some coughing, however noticed any fever, patient Hennies admit having some chills. Family states patient been increasingly lethargic and weak for the past several days. No reported vomiting or diarrhea. Patient has known COPD history, does have oxygen to wear at night and as needed though family states he would not normally wear it. REVIEW OF SYSTEMS     Review of Systems   Respiratory:  Positive for shortness of breath. All other systems reviewed and are negative. Information primarily through family       [address]    has a past medical history of COPD (chronic obstructive pulmonary disease) ([facility] [address] and Diabetes mellitus ([facility] [address] SURGICAL HISTORY      has a past surgical history that includes brain surgery.     CURRENT MEDICATIONS       Previous Medications    ALBUTEROL (PROVENTIL) (2.5 MG/3ML) 0.083% NEBULIZER SOLUTION    Take 3 mLs by nebulization every 2 hours as needed for Wheezing    BUDESONIDE-FORMOTEROL (SYMBICORT) 160-4.5 MCG/ACT AERO    Inhale 2 puffs into the lungs 2 times daily    CELECOXIB (CELEBREX) 200 MG CAPSULE    Take 200 mg by mouth daily    CELECOXIB (CELEBREX) 200 MG CAPSULE    Take 200 mg by mouth    FLUTICASONE-SALMETEROL (ADVAIR) 500-50 MCG/ACT AEPB DISKUS INHALER    INHALE 1 PUFF TWICE DAILY    GUAIFENESIN (MUCINEX) 600 MG EXTENDED RELEASE TABLET    Take 1 tablet by mouth 2 times daily    LEVOTHYROXINE (SYNTHROID) 112 MCG TABLET    Take 112 mcg by mouth Daily    LEVOTHYROXINE SODIUM 112 MCG CAPS    Take 112 mcg by mouth    METFORMIN (GLUCOPHAGE) 500 MG TABLET    Take 1,000 mg by mouth daily (with breakfast)    NAPROXEN (NAPROSYN) 375 MG TABLET    Take 1 tablet by mouth 2 times daily (with meals)    ROSUVASTATIN (CRESTOR) 10 MG TABLET    Take 10 mg by mouth daily    ROSUVASTATIN (CRESTOR) 10 MG TABLET    Take 10 mg by mouth    TIOTROPIUM (SPIRIVA RESPIMAT) 2.5 MCG/ACT AERS INHALER      = 2 puff(s), Inhalation, Daily, X 30 day(s), # 1 EA, Refills(s) 11, Pharmacy: [address] [place] [address] [address], 178, cm, 11/02/22 11:42:00 EDT, Height/Length Dosing, 61.4, kg, 11/02/22 11:42:00 EDT, Weight Dosing    TOPIRAMATE (TOPAMAX) 25 MG TABLET    Take 25 mg by mouth 2 times daily       ALLERGIES     has No Known Allergies. FAMILY HISTORY     has no family status information on file. family history is not on file. SOCIAL HISTORY      reports that he has been smoking cigarettes. He has been smoking an average of 1 pack per day. He has never used smokeless tobacco. He reports that he does not drink alcohol and does not use drugs. PHYSICAL EXAM     INITIAL VITALS:  weight is 140 lb (63.5 kg). His oral temperature is 97.5 °F (36.4 °C). His blood pressure is 96/72 and his pulse is 77. His respiration is 16 and oxygen saturation is 98%. Physical Exam   Constitutional: Patient is oriented to person, place, and time. Patient appears well-developed and well-nourished. Patient is active and cooperative. HENT:   Head: Normocephalic and atraumatic. Head is without contusion. Right Ear: Hearing and external ear normal. No drainage. Left Ear: Hearing and external ear normal. No drainage. Nose: Nose normal. No nasal deformity. No epistaxis. Mouth/Throat: Mucous membranes are slight dry. Eyes: EOMI. Conjunctivae, sclera, and lids are normal. Right eye exhibits no discharge. Left eye exhibits no discharge.    Neck: Full passive range of motion without pain and phonation normal.  Negative JVD, negative tracheal deviation  Cardiovascular: Regular rate, regular rhythm and intact distal pulses. No lower extremity edema. Pulses: Right radial pulse  2+   Pulmonary/Chest: Effort normal. No tachypnea and no bradypnea. Globally poor air movement with fine coarseness bilateral bases  Abdominal: Soft. Patient without distension or tenderness  Musculoskeletal:   Negative acute trauma or deformity,  apparent full range of motion and normal strength all extremities appropriate to age. Neurological: Patient is alert and oriented to person, place, and time. patient displays no tremor. Patient displays no seizure activity. .   Skin: Skin is warm and dry. Patient is not diaphoretic. Psychiatric: Patient has a normal mood and affect. Patient speech is normal and behavior is normal.     DIFFERENTIAL DIAGNOSIS:   Pneumonia bronchitis URI PTX viral illness NOS respiratory failure    DIAGNOSTIC RESULTS     EKG: All EKG's are interpreted by the Emergency Department Physician who either signs or Co-signs this chart in the absence of a cardiologist.  EKG    The patient had an EKG which is interpreted by me in the absence of a Cardiologist.   [] Without comparison to previous. [x] With comparison to a previous EKG Dated 1/9/2022    EKG @ 1128 hrs -sinus tachycardia rate 113, right axis, normal intervals, as compared to prior EKG no significant changes morphology      RADIOLOGY: non-plain film images(s) such as CT, Ultrasound and MRI are read by the radiologist.  XR CHEST PORTABLE   Final Result      1. Interval placement of an endotracheal tube. 2. Bilateral infrahilar airspace disease may be associated with pulmonary edema    and/or pneumonia. Recommend clinical correlation.          XR CHEST PORTABLE   Final Result      Mild increase in bibasilar infiltrates             LABS:   Labs Reviewed   BASIC METABOLIC PANEL - Abnormal; Notable for the following components:       Result Value    Glucose 147 (*)     Creatinine 0.61 (*)     Bun/Cre Ratio 31 (*)     CO2 37 (*)     Anion Gap 5 (*)     All other components within normal limits   CBC WITH AUTO DIFFERENTIAL - Abnormal; Notable for the following components:    Hemoglobin 13.2 (*)     RDW 15.9 (*)     Seg Neutrophils 90 (*)     Lymphocytes 3 (*)     Segs Absolute 9.70 (*)     Absolute Lymph # 0.30 (*)     All other components within normal limits   BRAIN NATRIURETIC PEPTIDE - Abnormal; Notable for the following components:    Pro- (*)     All other components within normal limits   URINALYSIS - Abnormal; Notable for the following components:    Protein, UA 1+ (*)     All other components within normal limits   MICROSCOPIC URINALYSIS - Abnormal; Notable for the following components:    Bacteria, UA 3+ (*)     All other components within normal limits   ARTERIAL BLOOD GAS, POC - Abnormal; Notable for the following components:    POC pH 7.309 (*)     POC pCO2 77.2 (*)     POC PO2 82.0 (*)     POC HCO3 38.8 (*)     Positive Base Excess, Art 9 (*)     All other components within normal limits   ARTERIAL BLOOD GAS, POC - Abnormal; Notable for the following components:    POC pH 7.273 (*)     POC pCO2 84.4 (*)     POC PO2 72.5 (*)     POC HCO3 39.0 (*)     Positive Base Excess, Art 8 (*)     POC O2 SAT 91 (*)     All other components within normal limits   ARTERIAL BLOOD GAS, POC - Abnormal; Notable for the following components:    POC pH 7.273 (*)     POC pCO2 83.6 (*)     POC PO2 135.7 (*)     POC HCO3 38.7 (*)     Positive Base Excess, Art 8 (*)     All other components within normal limits   ARTERIAL BLOOD GAS, POC - Abnormal; Notable for the following components:    POC pH 7.297 (*)     POC pCO2 79.7 (*)     POC PO2 132.1 (*)     POC HCO3 39.0 (*)     Positive Base Excess, Art 9 (*)     All other components within normal limits   COVID-19, RAPID   CULTURE, BLOOD 2   CULTURE, BLOOD 1   CULTURE, URINE   TROPONIN   LACTIC ACID       EMERGENCY DEPARTMENT COURSE:   Vitals: Vitals:    12/06/22 2114 12/06/22 2116 12/06/22 2131 12/06/22 2146   BP:  (!) 158/81 124/88 96/72   Pulse:  83 93 77   Resp:       Temp:       TempSrc:       SpO2: 99% 99% 100% 98%   Weight:         Patient history and physical exam taken at bedside, initial orders placed including EKG, chest x-ray, blood work, IV access. Patient placed on cardiac monitor, O2 via nasal cannula at 5 L/min, continuous pulse oximetry. Given patient's known history of COPD, DuoNeb breathing treatment and IV Solu-Medrol ordered. EKG as above    Chest x-ray reviewed, concerning for possible lower lobe early infiltrates    Discussed with patient and patient family present by initial review of current work-up, concerning for possible lower lobe pneumonia as a cause of his shortness of breath and exacerbated his known COPD, confirmed allergies, initiate patient ceftriaxone azithromycin in the emergency room. Blood cultures x2 ordered, ceftriaxone and azithromycin IV ordered    Is with concern for patient continued difficulty maintaining good pulse oximetry, will switch to BiPAP    Shortly after being on BiPAP, patient was not tolerating them as well, will give lorazepam 1 mg IV.     Discussed with family present patient's current work-up, at this time like to contact hospitalist to discuss possible admission, acknowledged    Rapid COVID-negative    Case was discussed with Dr. Andrés Carter, hospitalist, regarding patient's presentation and current work-up, he does asked that we get an ABG on the patient to check for any CO2 retention first    ~1310 hrs - ABG reviewed, pH 7.309, pCO2 77.2, pO2 82.0    Follow-up discussion with hospitalist Dr. Andrés Carter, asked that we place patient on BiPAP and recheck blood gas in 1 hour to make sure patient is improving otherwise may need transfer    ~1440 hrs -ABG reviewed, pH 7.273, pCO2 84.4, pO2 72.5    Hospitalist Dr. Andrés Carter updated on second blood gas, worsening condition, advises transfer    Discussed with family my conversation with hospitalist as above, need for transfer to higher care facility, given limitations of try to get patient's transferred to higher care facilities with appropriate staffing, will begin contacting various facilities. Case was discussed with [facility], regarding patient's presentation and current work-up, I do advise do not have beds in the [place] system at this time we will keep patient on wait list    Family update on my conversation with Ivan Dougherty, awaiting other facilities to call back    Case was discussed with [facility] [place] [facility] Dr. Michael Barnes, hospitalist, regarding patient's presentation current work-up, will except patient ICU though advises will be under admitting doctor Jaylin, given patient's current blood gas as feels patient will be better to be intubated for better control, will recheck ABG first has been a little bit of time, if no improvement consideration for intubation    Discussed with family my conversation above, patient is accepted to [facility] [place] [facility], we discussed possible need for intubation if patient's ABG is not showing improvement as has been on BiPAP for some time, acknowledged    ~1549 hrs - ABG reviewed, pH 7.273, pCO2 83.6, pO2 135.7    Given patient's nonimprovement despite being on ABG and finally obtaining good seal, will begin intubation. Patient was transferred to ER room 4, will contact respiratory therapy and begin placing orders for RSI drugs    Patient blood pressure was watched closely, his systolic did drop below 404 mmHg, will give IV fluid bolus in the interim    Please refer to procedure note below for intubation, refer to nursing notes for medication dosing    We began with Versed 0.05 mg/kg dose, calculated at 3.2 mg/h, and will titrate as needed, and will add fentanyl for continued control.     A working getting fentanyl started, we did get Versed maxed out at 10 mg an hour, patient was given 25 mcg of fentanyl IV prior to getting the drip started, patient blood pressure did come down we had to begin modulating to balance both fentanyl and Versed drip to maintain good sedation for patient.     We continue observe patient closely in the emergency room, final blood gas below showing marked improvement, patient getting good sedation, awaiting MICU transport    ~2300 hrs - ABG pH 7.396, pCO2 52.5, pO2 95.3    Upon arrival of MICU transport crew, report given by nursing, patient packaged for transport    Critical Care Time: 75 minutes, Including direct patient interaction exclusive of any separately billed procedures, including conversation with family, discussion with [facility], discussion with [facility] hospitalist Dr. Fanny Jacobs, discussion with MICU transfer crew    ~        PROCEDURES:  Intubation    Date/Time: 12/6/2022 10:08 PM  Performed by: Diomedes Kirk MD  Authorized by: Kenroy Griffin MD     Consent:     Consent obtained:  Emergent situation    Consent given by:  Healthcare agent (d/w family)  Universal protocol:     Procedure explained and questions answered to patient or proxy's satisfaction: yes      Relevant documents present and verified: yes      Test results available: yes      Imaging studies available: yes      Required blood products, implants, devices, and special equipment available: yes      Site/side marked: yes      Immediately prior to procedure, a time out was called: yes      Patient identity confirmed:  Arm band  Pre-procedure details:     Indication: predicted clinical deterioration      Patient status:  Altered mental status (lethargic)    Look externally: facial hair      Mouth opening - incisor distance:  3 or more finger widths    Hyoid-mental distance: 3 or more finger widths      Hyoid-thyroid distance: 2 or more finger widths      Obstruction: none      Neck mobility: normal      Pharmacologic strategy: RSI      Induction agents:  Etomidate    Paralytics:  Succinylcholine  Procedure details: Preoxygenation:  Bag valve mask    CPR in progress: no      Intubation method:  Oral    Intubation technique: video assisted      Laryngoscope blade: Mac 3    Bougie used: no      Tube size (mm):  7.5    Tube type:  Cuffed    Number of attempts:  2 (Initial attempt visualized through cords, good chest rise and ETCO2 color change and auscultation, possible displaced from movement, decided to re-intubate)    Ventilation between attempts: yes with mask      Tube visualized through cords: yes    Placement assessment:     ETT at teeth/gumline (cm):  22    Tube secured with:  ETT madrigal    Breath sounds:  Equal    Placement verification: chest rise, colorimetric ETCO2, CXR verification, direct visualization, equal breath sounds and tube exhalation      CXR findings:  Appropriate position  Post-procedure details:     Procedure completion:  Tolerated    Complications comment:  As above     FINAL IMPRESSION      1. Acute on chronic respiratory failure with hypoxia and hypercapnia (HCC)    2. Pneumonia of both lower lobes due to infectious organism    3. History of COPD          DISPOSITION/PLAN   trn    PATIENT REFERRED TO:  No follow-up provider specified.     DISCHARGE MEDICATIONS:  New Prescriptions    No medications on file           Summation      Patient Course:  trn    ED Medications administered this visit:    Medications   midazolam HCl (VERSED) 100 mg in dextrose 5 % 100 mL infusion (5 mg/hr IntraVENous Rate/Dose Change 12/6/22 2158)   fentaNYL (SUBLIMAZE) 1,000 mcg in sodium chloride 0.9 % 100 mL infusion (50 mcg/hr IntraVENous New Bag 12/6/22 2155)   ipratropium-albuterol (DUONEB) nebulizer solution 1 ampule (1 ampule Inhalation Given 12/6/22 1135)   methylPREDNISolone sodium (SOLU-MEDROL) injection 125 mg (125 mg IntraVENous Given 12/6/22 1118)   cefTRIAXone (ROCEPHIN) 1,000 mg in dextrose 5 % 50 mL IVPB mini-bag (0 mg IntraVENous Stopped 12/6/22 1317)   azithromycin (ZITHROMAX) 500 mg in dextrose 5 % 250 mL (vial-mate) IVPB (0 mg IntraVENous Stopped 12/6/22 1514)   LORazepam (ATIVAN) injection 1 mg (1 mg IntraVENous Given 12/6/22 1412)   ipratropium-albuterol (DUONEB) nebulizer solution 1 ampule (1 ampule Inhalation Given 12/6/22 1946)   midazolam HCl (VERSED) 50 MG/10ML injection (50 mg  Given 12/6/22 2110)   etomidate (AMIDATE) injection (20 mg IntraVENous Given 12/6/22 2102)   succinylcholine (ANECTINE) injection (50 mg IntraVENous Given 12/6/22 2102)   midazolam HCl (VERSED) 50 MG/10ML injection (50 mg  Given 12/6/22 2110)   succinylcholine (ANECTINE) injection 50 mg (50 mg IntraVENous Given 12/6/22 2122)   fentaNYL (SUBLIMAZE) injection 25 mcg (25 mcg IntraVENous Given 12/6/22 2133)   midazolam PF (VERSED) injection 2 mg (2 mg IntraVENous Given 12/6/22 2142)       New Prescriptions from this visit:    New Prescriptions    No medications on file       Follow-up:  No follow-up provider specified. Final Impression:   1. Acute on chronic respiratory failure with hypoxia and hypercapnia (HCC)    2. Pneumonia of both lower lobes due to infectious organism    3.  History of COPD               (Please note that portions of this note were completed with a voice recognition program.  Efforts were made to edit the dictations but occasionally words are mis-transcribed.)    MD Tmami Banda MD  12/07/22 2898

## 2022-12-07 NOTE — CARE COORDINATION
LSW tried to meet with pt at bedside. Pt is intubated. LSW called pt's grandson via phone. Per grandson pt lives at home with grandson. Have four kids. Independent at baseline. Have continues home O2 at home. Doug Ponce is unsure of amount of O2 pt is on. [facility]'s grandson. Grandson report, \" Not that I know of he does not have POA or Living filled out. \"  Grandson provided LSW pt's daughter's name and number. Xavier Marcus #[phone]. LSW updated Emergency Contact list.   LSW notify [facility] for pt admission. DC PLAN: TBD; pt on vent. LSW will follow.      Electronically signed by FRANCISCO Pritchett on 12/7/2022 at 9:21 AM

## 2022-12-07 NOTE — PROGRESS NOTES
[address] [facility] Dose Adjustment Per Protocol:  Zosyn Extended Interval Interchange      Emani Hawkins is a 78 y.o. male. The following ordered dose of Zosyn has been changed to optimize its pharmacodynamic profile per [facility] pharmacy policy approved by [facility]. Recent Labs     12/06/22  1115 12/06/22  1130 12/07/22  0338   CREATININE  --  0.61* 0.44*   WBC 10.8  --  5.7     . Height: 5' 9\" (175.3 cm), Weight: 138 lb 0.1 oz (62.6 kg), Body mass index is 20.38 kg/m². Estimated Creatinine Clearance: 128 mL/min (A) (based on SCr of 0.44 mg/dL (L)). Medication Ordered:   Traditional Dosing   [] Zosyn 2.25 gm q6-8 hr   [x] Zosyn 3.375 gm q6-8 hr   [] Zosyn 4.5 gm q6-8 hr   [] Zosyn 2.25 gm q6-8 hr   [] Zosyn 3.375 gm q6-8 hr   [] Zosyn 4.5 gm q6-8 hr     New Dose      Piperacillin/Tazobactam - Extended Infusion Dosing (4-hour infusion) - Preferred Dosing Strategy       Renal Function (CrCl mL/min)  ? 20  < 20, HD, PD  CRRT    All Indications - Loading dose of 4500 milligrams x 1 over 30 minutes or via IV push. Maintenance dose  should begin 6 hours after load for CrCl > 20 and 8 hours after load for CrCl < 20. Maintenance dosing for all indications except as outlined below  [x] 3375mg q8h  [] 3375mg q12h  [] 3375mg q8h    Febrile neutropenia, Cystic fibrosis, BMI > 40*, local Pseudomonas susceptibility < 80% (refer to page 3 for indications)     [] 4500mg q8h  [] 4500 q12h  [] 4500mg q8h    *Consider 3375mg q8h for indication of Urinary tract infections in BMI > 40. Adjust for decreased renal function.            Thank Rafaela Ayala [facility]  12/7/2022 9:22 AM

## 2022-12-07 NOTE — PROGRESS NOTES
Internal Medicine   Hospitalist   Progress Note    2022   1:48 PM    Name:  Yancy Coker  MRN:    [id number]     IP Day: 0     Admit Date: 2022  3:00 AM  PCP: Jim Escalera MD    Code Status:  Full Code    Assessment and Plan: Active Problems/ diagnosis:     Acute on chronic hypoxic and hypercapnic respiratory failure in the setting of RSV and adenovirus  COPD exacerbation  Community-acquired pneumonia  Hypothyroidism  Hyperlipidemia    Plan  Continue monitoring closely in ICU  CPAP trial today, on 50% FiO2 with PEEP of 5  We will add Zosyn empirically although most likely viral, will consider stopping tomorrow if continues to improve  Discussed plan of care with patient's family at bedside  Resume home meds as ordered. DVT PPx     7 pm- 7 am, please contact on call Hospitalist for any needs     Subjective:      no new events. Poor historian due to intubation.     Physical Examination:      Vitals:  /76   Pulse 64   Temp 97.9 °F (36.6 °C) (Rectal)   Resp 17   Ht 5' 9\" (1.753 m)   Wt 138 lb 0.1 oz (62.6 kg)   SpO2 91%   BMI 20.38 kg/m²   Temp (24hrs), Av.1 °F (36.2 °C), Min:95.7 °F (35.4 °C), Max:97.9 °F (36.6 °C)      General appearance: Awake, on CPAP trial, following commands   Mental Status: Deferred due to intubation  Lungs: Diminished bilaterally, normal effort  Heart: regular rate and rhythm, no murmur  Abdomen: soft, nontender, nondistended, bowel sounds present, no masses  Extremities: no edema, redness, tenderness in the calves  Skin: no gross lesions, rashes  Neurologically awake    Data:     Labs:  Recent Labs     22  1115 22  0338 22  1245   WBC 10.8 5.7  --    HGB 13.2* 9.9* 11.7*    192  --      Recent Labs     22  1130 22  0338 22  1245    139  --    K 4.4 4.2  4.2  --    CL 98 102  --    CO2 37* 32*  --    BUN 19 25*  --    CREATININE 0.61* 0.44* 0.6*   GLUCOSE 147* 105*  --      Recent Labs     22  0338   AST 7 ALT <5   BILITOT <0.2   ALKPHOS 48       Current Facility-Administered Medications   Medication Dose Route Frequency Provider Last Rate Last Admin    enoxaparin (LOVENOX) injection 40 mg  40 mg SubCUTAneous Daily Deborrah Parcel D Sedar, DO   40 mg at 12/07/22 0736    ondansetron (ZOFRAN-ODT) disintegrating tablet 4 mg  4 mg Oral Q8H PRN Serene Soda Sedar, DO        Or    ondansetron (ZOFRAN) injection 4 mg  4 mg IntraVENous Q6H PRN Serene Soda Sedar, DO        polyethylene glycol (GLYCOLAX) packet 17 g  17 g Oral Daily PRN Serene Soda Sedar, DO        acetaminophen (TYLENOL) tablet 650 mg  650 mg Oral Q6H PRN Serene Soda Sedar, DO        Or    acetaminophen (TYLENOL) suppository 650 mg  650 mg Rectal Q6H PRN Serene Soda Sedar, DO        chlorhexidine (PERIDEX) 0.12 % solution 15 mL  15 mL Mouth/Throat BID Serene Soda Sedar, DO   15 mL at 12/07/22 0736    famotidine (PEPCID) 20 mg in sodium chloride (PF) 0.9 % 10 mL injection  20 mg IntraVENous BID Serene Soda Sedar, DO   20 mg at 12/07/22 0737    propofol injection  5-50 mcg/kg/min IntraVENous Continuous Serene Soda Sedar, DO   Stopped at 12/07/22 0955    glucose chewable tablet 16 g  4 tablet Oral PRN Deborrah Parcel D Sedar, DO        dextrose bolus 10% 125 mL  125 mL IntraVENous PRN Serene Soda Sedar, DO        Or    dextrose bolus 10% 250 mL  250 mL IntraVENous PRN Serene Soda Sedar, DO        glucagon (rDNA) injection 1 mg  1 mg SubCUTAneous PRN Deborrah Parcel D Sedar, DO        dextrose 10 % infusion   IntraVENous Continuous PRN Serene Soda Sedar, DO        insulin lispro (HUMALOG) injection vial 0-4 Units  0-4 Units SubCUTAneous Q4H Buzz D Sedar, DO        methylPREDNISolone sodium (SOLU-MEDROL) injection 40 mg  40 mg IntraVENous Daily Deborrah Parcel D Sedar, DO   40 mg at 12/07/22 0737    levothyroxine (SYNTHROID) tablet 112 mcg  112 mcg Oral Daily Serene Soda Sedar, DO   112 mcg at 12/07/22 1112    rosuvastatin (CRESTOR) tablet 10 mg  10 mg Oral Daily Buzz Gtz DO   10 mg at 12/07/22 1112    piperacillin-tazobactam (ZOSYN) 3,375 mg in dextrose 5 % 50 mL IVPB (mini-bag)  3,375 mg IntraVENous Q8H Tima Lawson DO        midodrine (PROAMATINE) tablet 10 mg  10 mg Oral TID WC Yadiel Victor MD   10 mg at 12/07/22 1103    fentaNYL (SUBLIMAZE) 1,000 mcg in sodium chloride 0.9 % 100 mL infusion  25 mcg/hr IntraVENous Continuous Yadiel Victor MD 5 mL/hr at 12/07/22 1258 50 mcg/hr at 12/07/22 1258    docusate (COLACE) 50 MG/5ML liquid 100 mg  100 mg Oral BID Jazmin Markham MD   100 mg at 12/07/22 1103    nicotine (NICODERM CQ) 21 MG/24HR 1 patch  1 patch TransDERmal Daily Da Patel DO   1 patch at 12/07/22 1305       Additional work up or/and treatment plan may be added today or then after based on clinical progression. I am managing a portion of pt care. Some medical issues are handled by other specialists. Additional work up and treatment should be done in out pt setting by pt PCP and other out pt providers. In addition to examining and evaluating pt, I spent additional time explaining care, normaland abnormal findings, and treatment plan. All of pt questions were answered. Counseling, diet and education were provided. Case will be discussed with nursing staff when appropriate. Family will be updated if and when appropriate.        Electronically signed by Da Patel DO on 12/7/2022 at 1:48 PM

## 2022-12-07 NOTE — CARE COORDINATION
Patient admitted with COPD exacerbation, but  also has RSV. COPD booklet and zone pamphlet mailed to patient residence for his review.  Electronically signed by Kristyn Rice RN on 12/7/2022 at 9:09 AM

## 2022-12-07 NOTE — CONSULTS
Pulmonary and Critical Care Medicine  Consult Note  Encounter Date: 2022 8:47 AM    Mr. Daniela Ludwig is a 78 y.o. male  : 1945  Requesting Provider: Chary Hernandez DO    Reason for request: ICU management            HISTORY OF PRESENT ILLNESS:    Patient is 78 y.o. presents with patient on vent, sedated, open his eyes, he is unable to provide history, history obtained from chart. Patient admitted with worsening shortness of breath, per notes indicate coughing, no fever, symptoms has been going on for 2 to 3 days, reported increasingly lethargic and weak for several days. No nausea or vomiting, he is on oxygen at home as needed and while asleep. Follow-up ABG showed worsening hypercapnia and hypoxia, patient eventually intubated. Past Medical History:        Diagnosis Date    COPD (chronic obstructive pulmonary disease) ([facility]     Diabetes mellitus ([facility] [facility]        Past Surgical History:        Procedure Laterality Date    BRAIN SURGERY         Social History:     reports that he has been smoking cigarettes. He has been smoking an average of 1 pack per day. He has never used smokeless tobacco. He reports that he does not drink alcohol and does not use drugs. Family History:   No family history on file. Allergies:  Patient has no known allergies.         MEDICATIONS during current hospitalization:    Continuous Infusions:   propofol 20 mcg/kg/min (22 0315)    dextrose      norepinephrine         Scheduled Meds:   propofol        enoxaparin  40 mg SubCUTAneous Daily    chlorhexidine  15 mL Mouth/Throat BID    famotidine (PEPCID) injection  20 mg IntraVENous BID    insulin lispro  0-4 Units SubCUTAneous Q4H    methylPREDNISolone  40 mg IntraVENous Daily       PRN Meds:ondansetron **OR** ondansetron, polyethylene glycol, acetaminophen **OR** acetaminophen, glucose, dextrose bolus **OR** dextrose bolus, glucagon (rDNA), dextrose        REVIEW OF SYSTEMS:   Not obtainable, on vent, sedated. PHYSICAL EXAM:    Vitals:  BP (!) 106/53   Pulse (!) 46   Temp (!) 95.7 °F (35.4 °C) (Rectal)   Resp 16   SpO2 100%     General: On vent, sedated open his eyes, follows simple commands  Head: normocephalic, atraumatic  Eyes:No gross abnormalities. ENT:  MMM no lesions  Neck:  supple and no masses  Chest : Vent sounds, no wheezing, no rales, nontender, tympanic  Heart[de-identified] Heart sounds are normal.  Regular rate and rhythm without murmur, gallop or rub. ABD:  symmetric, soft, non-tender  Musculoskeletal : no cyanosis, no clubbing, and no edema  Neuro:   Able to move all 4 extremities  Skin: No rashes or nodules noted. Lymph node:  no cervical nodes  Urology: Yes Sherman   Psychiatric: Calm        Data Review  Recent Labs     12/06/22  1115 12/07/22  0338   WBC 10.8 5.7   HGB 13.2* 9.9*   HCT 41.8 32.0*    192      Recent Labs     12/06/22  1130 12/07/22  0338    139   K 4.4 4.2  4.2   CL 98 102   CO2 37* 32*   BUN 19 25*   CREATININE 0.61* 0.44*   GLUCOSE 147* 105*       MV Settings:     Vent Mode: AC/VC  Vt (Set, mL): 450 mL  Resp Rate (Set): 16 bmp  PEEP/CPAP (cmH2O): 5  Peak Inspiratory Pressure (cmH2O): 30 cmH2O  Mean Airway Pressure (cmH2O): 9 cmH20  I:E Ratio: 1:2.70    ABGs: No results for input(s): PHART, AOU9WYG, PO2ART, MAG5TFO, BEART, O2XJHXOB, IOM6TPF in the last 72 hours. O2 Device: Ventilator  Lab Results   Component Value Date/Time    LACTA 0.9 12/07/2022 03:38 AM    LACTA 0.6 12/06/2022 12:30 PM       Radiology  I personally reviewed imaging studies and hazy groundglass changes    Positive adenovirus and RSV PCR    Assessment, plan:    This is a critically ill patient at risk of deterioration / death , needing close ICU monitoring and intervention due to below noted problems       Acute on chronic hypoxic and hypercapnic respiratory failure  Adenovirus and RSV pneumonia  COPD exacerbation  Acute encephalopathy secondary to above  Diabetes    Recommendation  Vent support lung

## 2022-12-07 NOTE — ED NOTES
PT MOVED TO ROOM 4 FOR INTUBATION. FAMILY AT CART AND UPDATED ON POC. SR ON MONITOR. PT ATTEMPTING TO GRAB MASK WHEN STIMULATED. REASSURED PT OF SAFETY.       Arlette Mcclure RN  12/06/22 1945

## 2022-12-07 NOTE — CARE COORDINATION
[facility] Case Management Initial Discharge Assessment    Met with Family to discuss discharge plan. PCP: Savannah Saldaña MD                                Date of Last Visit: N/A    [facility] Patient: Yes        [facility] Notified: yes - [facility]    If no PCP, list provided? N/A    Discharge Planning    Living Arrangements: independently at home    Who do you live with? GRANDSON     Who helps you with your care:  self    If lives at home:     Do you have any barriers navigating in your home? no    Patient can perform ADL? Yes    Current Services (outpatient and in home) :  None    Dialysis: No    Is transportation available to get to your appointments? Yes    DME Equipment:  yes - WALKER    Respiratory equipment: YES CONTINUES O2 UNKNOWN LITER     Respiratory provider:  yes - [facility]     Pharmacy:  yes - [facility] with Medication Assistance Program?  No      [facility]? N/A    Patient agreeable to SNF/Rehab? N/A    Other discharge needs identified? N/A    Does Patient Have a High-Risk for Readmission Diagnosis (CHF, PN, MI, COPD)? No        Initial Discharge Plan? (Note: please see concurrent daily documentation for any updates after initial note). FRANCISCO SPOKE WITH PT'S GRAND SON VIA PHONE. PT LIVES AT HOME WITH GRANDSON. INDEPENDENT. USE WALKER. DC PLAN TBD AT THIS TIME. PT IS INTUBATED. Readmission Risk              Risk of Unplanned Readmission:  13        CMI DONE.    Electronically signed by FRANCISCO Mcfadden on 12/7/2022 at 9:11 AM

## 2022-12-07 NOTE — PROGRESS NOTES
Shift Summary:  5664: Patient in ICU room 16 accompanied by transport team, on ventilator and Midazolam and Fentanyl for Sedation. Patient responds to pain and will not follow commands. Patient connected to ICU monitor, protective mepilex placed on sacrum and heels. Patient's skin is intact, whole body is dry and flaky, feet especially thick overgrown toe nails and calloused feet. Patient has very small open area of skin on nose, presumably from BIPAP from other facility. Patient had dried fecal matter on buttocks, cleaned off. Og Tube placed at 61cm at the teeth and set to low intermittent suction. 6618: Patient remains hypotensive given verbal order for 1 liter bolus of LR at this time. 0315: verbal order from Dr. Marcy Powers ordered propofol for sedation and versed and fentanyl gtt turned off both bags from outside facility. 8065: Notified Dr. Marcy Powers about bp trending down with LR bolus almost complete, no orders at this time. Chest xray done. 6531: notified Dr. Marcy Powers that LR bolus is done and Bp's have responded minimally, as well as new bradycardia. Received verbal order to do an EKG. As well as push 0.5 mg of atropine. 0423: 2nd LR bolus started. 8762: 0.5 mg of atropine given. 0441: Notified DR. Gtz that the atropine was not effective for HR. Received verbal order to push another 0.5mg.  0448: 0.5mg atropine given, effective this time HR in the 50's. 0530: 2nd LR bolus ended. 624: Patient attempting to sit up at this time, thrashing extremities around. This RN able to redirect patient and calm patient. Patient reoriented on the situation that occurred for his hospitalization, patient able to look at this RN and follow commands. Shortly after patient began attempting to sit up again and was thrashing head back and forth. Propofol titrated.   0700: Bedside handoff report given to [facility] ariana, as well as 60cc's of Midazolam and 60cc's of Fentanyl wasted by this RN and Vanessa No as witness to waste.

## 2022-12-07 NOTE — INTERDISCIPLINARY ROUNDS
Spiritual Care Services     Summary of Visit:  This  participated in morning interdisciplinary rounds. Patient's son was present. However, he left shortly after rounds so was not able to speak with him. Patient is intubated at this time. No AD noted in patient's chart or Epic. Encounter Summary  Encounter Overview/Reason : Interdisciplinary rounds  Service Provided For[de-identified] Family  Referral/Consult From[de-identified] Rounding  Support System: Children, Family members  Complexity of Encounter: Low  Begin Time: 1040  End Time : 1000  Total Time Calculated: 5 min  Encounter   Type: Initial Screen/Assessment                            Spiritual Assessment/Intervention/Outcomes:    Assessment: Unable to assess    Intervention: Sustaining Presence/Ministry of presence    Outcome: Did not respond      Care Plan:    Plan and Referrals  Plan/Referrals: Continue Support (comment)    Continue to support. Spiritual Care Services   Electronically signed by [facility] on 12/7/2022 at 10:38 AM.    To reach a  for emotional and spiritual support, place an [facility] \[facility]\"" consult request.   If a  is needed immediately, dial 0 and ask to page the on-call .

## 2022-12-07 NOTE — PLAN OF CARE
Problem: Discharge Planning  Goal: Discharge to home or other facility with appropriate resources  Outcome: Progressing  Flowsheets (Taken 12/7/2022 0800)  Discharge to home or other facility with appropriate resources: Identify barriers to discharge with patient and caregiver     Problem: Safety - Medical Restraint  Goal: Remains free of injury from restraints (Restraint for Interference with Medical Device)  Description: INTERVENTIONS:  1. Determine that other, less restrictive measures have been tried or would not be effective before applying the restraint  2. Evaluate the patient's condition at the time of restraint application  3. Inform patient/family regarding the reason for restraint  4. Q2H: Monitor safety, psychosocial status, comfort, nutrition and hydration  Outcome: Progressing  Flowsheets  Taken 12/7/2022 0800 by Zeinab Garcia RN  Remains free of injury from restraints (restraint for interference with medical device): Every 2 hours: Monitor safety, psychosocial status, comfort, nutrition and hydration  Taken 12/7/2022 0315 by Melany Haro RN  Remains free of injury from restraints (restraint for interference with medical device): Every 2 hours: Monitor safety, psychosocial status, comfort, nutrition and hydration     Problem: Chronic Conditions and Co-morbidities  Goal: Patient's chronic conditions and co-morbidity symptoms are monitored and maintained or improved  Outcome: Progressing  Flowsheets (Taken 12/7/2022 0800)  Care Plan - Patient's Chronic Conditions and Co-Morbidity Symptoms are Monitored and Maintained or Improved: Monitor and assess patient's chronic conditions and comorbid symptoms for stability, deterioration, or improvement     Problem: Pain  Goal: Verbalizes/displays adequate comfort level or baseline comfort level  Outcome: Progressing     Problem: Skin/Tissue Integrity  Goal: Absence of new skin breakdown  Description: 1. Monitor for areas of redness and/or skin breakdown  2. Assess vascular access sites hourly  3. Every 4-6 hours minimum:  Change oxygen saturation probe site  4. Every 4-6 hours:  If on nasal continuous positive airway pressure, respiratory therapy assess nares and determine need for appliance change or resting period.   Outcome: Progressing     Problem: ABCDS Injury Assessment  Goal: Absence of physical injury  Outcome: Progressing     Problem: Safety - Adult  Goal: Free from fall injury  Outcome: Progressing

## 2022-12-07 NOTE — PROGRESS NOTES
0800- Patient assessment complete, see flowsheets. Pt. Intubated on vent, sedated with propofol. Pt. Opens eyes to voice and follows commands. Pt. Restless and attempting to pull at equipment, so propofol titrated up per order. Pt. Having thick, yellow secretions from ETT. Sputum culture collected and sent to lab. Sherman in place draining yellow cloudy urine. Urine tests collected and sent to lab. Pt. Has smear of brown BM. OG in place to LIS. Pt. Sinus leo rate 40s-50s. BP stable at this time. Unable to get oral or axillary temp on pt., rectal probe placed showing pt. Hypothermic 35.4, so warming blanket placed on pt. Blood sugar low at 65, pt. Received scheduled solu-medrol and glucose recheck at 79.     0930- spoke with patient's grandson, Chloé Hernandez, who states he lives with pt. Grandson states pt. Wears O2 nasal cannula at home, but does not know flow rate of oxygen. States pt. Does not wear cpap/bipap at home. States pt. Is mostly independent at home but grandson helps with housework. Grandson unable to identify medications that pt. Takes, but states that pt. Does take his medications. Patient's child and grandson's mother, [facility], added into contact information. Propofol stopped. Pt. Awake and following commands. SBT started ~1030. Pt. Restless and anxious. Television on for distraction. Provided education and reassurance to pt. Family at bedside with pt. [email or web address] tech in room. 1200- pt. Remains intubated on breathing trial. Pt. Restless and re-directed frequently. Vitals stable and warming blanket turned off. After 2 hours of SBT, ABG done and sent to Dr. Eb Feng per RT. Ordered to place pt. Back on vent. Sedation restarted. Unable to complete full home med rec. Family able to list some of patient's home medications, but unsure of all of them. Pt. Remains intubated and unable to speak.  Attempted to call [facility] number on patient's medication bottles as well as VA number given by , however unable to reach anyone for information. PT. Becoming hypotensive, levophed re-ordered and started. Pt. Later becoming bradycardic down to 36 bpm. Propofol lowered and stopped. Pt. Then more awake and hypertensive. Levophed stopped. Pt. Now sedated with fentanyl, increased due to patient restlessness and agitation. 1600- pt. Continues to follow commands, nod/gesture appropriately, and make needs known by writing on paper. BP soft but stable. Levophed remains off so neither CVC nor picc placed at this time. Pt. Receiving scheduled midodrine and given 500cc LR bolus per Dr. Lorraine Covington. Temperature remains stable and warming blanket remains off. Pt. Responded well to fluid bolus with BP up to 121/64, so another fluid bolus ordered. Remainder of shift uneventful. Handoff report given to [employer].

## 2022-12-08 LAB
ALBUMIN SERPL-MCNC: 2.7 G/DL (ref 3.5–4.6)
ALP BLD-CCNC: 48 U/L (ref 35–104)
ALT SERPL-CCNC: 6 U/L (ref 0–41)
ANION GAP SERPL CALCULATED.3IONS-SCNC: 6 MEQ/L (ref 9–15)
AST SERPL-CCNC: 9 U/L (ref 0–40)
BASE EXCESS ARTERIAL: 8 (ref -3–3)
BASOPHILS ABSOLUTE: 0 K/UL (ref 0–0.2)
BASOPHILS RELATIVE PERCENT: 0.3 %
BILIRUB SERPL-MCNC: <0.2 MG/DL (ref 0.2–0.7)
BILIRUBIN DIRECT: <0.2 MG/DL (ref 0–0.4)
BILIRUBIN, INDIRECT: ABNORMAL MG/DL (ref 0–0.6)
BUN BLDV-MCNC: 25 MG/DL (ref 8–23)
CALCIUM IONIZED: 1.33 MMOL/L (ref 1.12–1.32)
CALCIUM SERPL-MCNC: 9 MG/DL (ref 8.5–9.9)
CHLORIDE BLD-SCNC: 100 MEQ/L (ref 95–107)
CO2: 32 MEQ/L (ref 20–31)
CREAT SERPL-MCNC: 0.49 MG/DL (ref 0.7–1.2)
EOSINOPHILS ABSOLUTE: 0 K/UL (ref 0–0.7)
EOSINOPHILS RELATIVE PERCENT: 0.5 %
GFR SERPL CREATININE-BSD FRML MDRD: >60 ML/MIN/{1.73_M2}
GFR SERPL CREATININE-BSD FRML MDRD: >60 ML/MIN/{1.73_M2}
GLUCOSE BLD-MCNC: 114 MG/DL (ref 70–99)
GLUCOSE BLD-MCNC: 71 MG/DL (ref 70–99)
GLUCOSE BLD-MCNC: 74 MG/DL (ref 70–99)
GLUCOSE BLD-MCNC: 76 MG/DL (ref 70–99)
GLUCOSE BLD-MCNC: 88 MG/DL (ref 70–99)
GLUCOSE BLD-MCNC: 91 MG/DL (ref 70–99)
GLUCOSE BLD-MCNC: 93 MG/DL (ref 70–99)
GLUCOSE BLD-MCNC: 96 MG/DL (ref 70–99)
HCO3 ARTERIAL: 34.3 MMOL/L (ref 21–29)
HCT VFR BLD CALC: 35.2 % (ref 42–52)
HEMOGLOBIN: 11 G/DL (ref 14–18)
HEMOGLOBIN: 13.4 GM/DL (ref 13.5–17.5)
INR BLD: 1.1
LACTATE: 0.41 MMOL/L (ref 0.4–2)
LYMPHOCYTES ABSOLUTE: 0.8 K/UL (ref 1–4.8)
LYMPHOCYTES RELATIVE PERCENT: 10.9 %
MCH RBC QN AUTO: 26 PG (ref 27–31.3)
MCHC RBC AUTO-ENTMCNC: 31.4 % (ref 33–37)
MCV RBC AUTO: 82.8 FL (ref 79–92.2)
MONOCYTES ABSOLUTE: 0.7 K/UL (ref 0.2–0.8)
MONOCYTES RELATIVE PERCENT: 9.2 %
NEUTROPHILS ABSOLUTE: 6 K/UL (ref 1.4–6.5)
NEUTROPHILS RELATIVE PERCENT: 79.1 %
O2 SAT, ARTERIAL: 91 % (ref 93–100)
PCO2 ARTERIAL: 71 MM HG (ref 35–45)
PDW BLD-RTO: 16.2 % (ref 11.5–14.5)
PERFORMED ON: ABNORMAL
PERFORMED ON: ABNORMAL
PERFORMED ON: NORMAL
PH ARTERIAL: 7.29 (ref 7.35–7.45)
PLATELET # BLD: 213 K/UL (ref 130–400)
PO2 ARTERIAL: 69 MM HG (ref 75–108)
POC CHLORIDE: 101 MEQ/L (ref 99–110)
POC CREATININE: 0.7 MG/DL (ref 0.8–1.3)
POC FIO2: 40
POC HEMATOCRIT: 40 % (ref 41–53)
POC POTASSIUM: 3.8 MEQ/L (ref 3.5–5.1)
POC SAMPLE TYPE: ABNORMAL
POC SODIUM: 142 MEQ/L (ref 136–145)
POTASSIUM REFLEX MAGNESIUM: 3.8 MEQ/L (ref 3.4–4.9)
PROCALCITONIN: 0.07 NG/ML (ref 0–0.15)
PROTHROMBIN TIME: 14.2 SEC (ref 12.3–14.9)
RBC # BLD: 4.25 M/UL (ref 4.7–6.1)
SODIUM BLD-SCNC: 138 MEQ/L (ref 135–144)
TCO2 ARTERIAL: 37 MMOL/L (ref 21–32)
TOTAL PROTEIN: 5.8 G/DL (ref 6.3–8)
URINE CULTURE, ROUTINE: NORMAL
WBC # BLD: 7.6 K/UL (ref 4.8–10.8)

## 2022-12-08 PROCEDURE — 6360000002 HC RX W HCPCS: Performed by: INTERNAL MEDICINE

## 2022-12-08 PROCEDURE — 36600 WITHDRAWAL OF ARTERIAL BLOOD: CPT

## 2022-12-08 PROCEDURE — 85014 HEMATOCRIT: CPT

## 2022-12-08 PROCEDURE — 94660 CPAP INITIATION&MGMT: CPT

## 2022-12-08 PROCEDURE — 36415 COLL VENOUS BLD VENIPUNCTURE: CPT

## 2022-12-08 PROCEDURE — 84145 PROCALCITONIN (PCT): CPT

## 2022-12-08 PROCEDURE — 6370000000 HC RX 637 (ALT 250 FOR IP): Performed by: INTERNAL MEDICINE

## 2022-12-08 PROCEDURE — 82803 BLOOD GASES ANY COMBINATION: CPT

## 2022-12-08 PROCEDURE — 2580000003 HC RX 258: Performed by: INTERNAL MEDICINE

## 2022-12-08 PROCEDURE — 99291 CRITICAL CARE FIRST HOUR: CPT | Performed by: INTERNAL MEDICINE

## 2022-12-08 PROCEDURE — 84295 ASSAY OF SERUM SODIUM: CPT

## 2022-12-08 PROCEDURE — 94003 VENT MGMT INPAT SUBQ DAY: CPT

## 2022-12-08 PROCEDURE — 80076 HEPATIC FUNCTION PANEL: CPT

## 2022-12-08 PROCEDURE — A4216 STERILE WATER/SALINE, 10 ML: HCPCS | Performed by: INTERNAL MEDICINE

## 2022-12-08 PROCEDURE — 84132 ASSAY OF SERUM POTASSIUM: CPT

## 2022-12-08 PROCEDURE — 80048 BASIC METABOLIC PNL TOTAL CA: CPT

## 2022-12-08 PROCEDURE — 82330 ASSAY OF CALCIUM: CPT

## 2022-12-08 PROCEDURE — 85025 COMPLETE CBC W/AUTO DIFF WBC: CPT

## 2022-12-08 PROCEDURE — 83605 ASSAY OF LACTIC ACID: CPT

## 2022-12-08 PROCEDURE — 82435 ASSAY OF BLOOD CHLORIDE: CPT

## 2022-12-08 PROCEDURE — 85610 PROTHROMBIN TIME: CPT

## 2022-12-08 PROCEDURE — 2500000003 HC RX 250 WO HCPCS: Performed by: INTERNAL MEDICINE

## 2022-12-08 PROCEDURE — 2700000000 HC OXYGEN THERAPY PER DAY

## 2022-12-08 PROCEDURE — 82948 REAGENT STRIP/BLOOD GLUCOSE: CPT

## 2022-12-08 PROCEDURE — 2000000000 HC ICU R&B

## 2022-12-08 PROCEDURE — 82565 ASSAY OF CREATININE: CPT

## 2022-12-08 RX ADMIN — MIDODRINE HYDROCHLORIDE 10 MG: 5 TABLET ORAL at 18:27

## 2022-12-08 RX ADMIN — ENOXAPARIN SODIUM 40 MG: 100 INJECTION SUBCUTANEOUS at 07:48

## 2022-12-08 RX ADMIN — MIDODRINE HYDROCHLORIDE 10 MG: 5 TABLET ORAL at 07:47

## 2022-12-08 RX ADMIN — LEVOTHYROXINE SODIUM 112 MCG: 112 TABLET ORAL at 06:13

## 2022-12-08 RX ADMIN — 0.12% CHLORHEXIDINE GLUCONATE 15 ML: 1.2 RINSE ORAL at 07:47

## 2022-12-08 RX ADMIN — Medication 10 ML: at 07:49

## 2022-12-08 RX ADMIN — DOCUSATE SODIUM 100 MG: 50 LIQUID ORAL at 07:47

## 2022-12-08 RX ADMIN — 0.12% CHLORHEXIDINE GLUCONATE 15 ML: 1.2 RINSE ORAL at 22:55

## 2022-12-08 RX ADMIN — FAMOTIDINE 20 MG: 10 INJECTION INTRAVENOUS at 07:47

## 2022-12-08 RX ADMIN — METHYLPREDNISOLONE SODIUM SUCCINATE 40 MG: 40 INJECTION, POWDER, FOR SOLUTION INTRAMUSCULAR; INTRAVENOUS at 07:47

## 2022-12-08 RX ADMIN — Medication 10 ML: at 22:55

## 2022-12-08 RX ADMIN — FAMOTIDINE 20 MG: 10 INJECTION INTRAVENOUS at 22:55

## 2022-12-08 RX ADMIN — PIPERACILLIN AND TAZOBACTAM 3375 MG: 3; .375 INJECTION, POWDER, FOR SOLUTION INTRAVENOUS at 02:50

## 2022-12-08 RX ADMIN — DOCUSATE SODIUM 100 MG: 50 LIQUID ORAL at 22:55

## 2022-12-08 RX ADMIN — MIDODRINE HYDROCHLORIDE 10 MG: 5 TABLET ORAL at 11:35

## 2022-12-08 RX ADMIN — FENTANYL CITRATE 75 MCG/HR: 0.05 INJECTION, SOLUTION INTRAMUSCULAR; INTRAVENOUS at 00:52

## 2022-12-08 RX ADMIN — ROSUVASTATIN CALCIUM 10 MG: 20 TABLET, FILM COATED ORAL at 07:47

## 2022-12-08 RX ADMIN — FENTANYL CITRATE 75 MCG/HR: 0.05 INJECTION, SOLUTION INTRAMUSCULAR; INTRAVENOUS at 15:39

## 2022-12-08 ASSESSMENT — PULMONARY FUNCTION TESTS
PIF_VALUE: 36
PIF_VALUE: 11
PIF_VALUE: 22
PIF_VALUE: 21
PIF_VALUE: 20
PIF_VALUE: 23
PIF_VALUE: 25
PIF_VALUE: 18
PIF_VALUE: 23
PIF_VALUE: 23
PIF_VALUE: 13
PIF_VALUE: 21
PIF_VALUE: 11
PIF_VALUE: 23
PIF_VALUE: 21
PIF_VALUE: 18
PIF_VALUE: 20
PIF_VALUE: 21
PIF_VALUE: 21
PIF_VALUE: 35
PIF_VALUE: 22
PIF_VALUE: 23
PIF_VALUE: 18
PIF_VALUE: 22
PIF_VALUE: 18
PIF_VALUE: 22
PIF_VALUE: 21
PIF_VALUE: 18
PIF_VALUE: 19
PIF_VALUE: 22
PIF_VALUE: 11
PIF_VALUE: 22
PIF_VALUE: 20
PIF_VALUE: 22
PIF_VALUE: 19
PIF_VALUE: 21
PIF_VALUE: 28
PIF_VALUE: 18
PIF_VALUE: 21

## 2022-12-08 ASSESSMENT — PAIN SCALES - GENERAL
PAINLEVEL_OUTOF10: 0
PAINLEVEL_OUTOF10: 1
PAINLEVEL_OUTOF10: 0
PAINLEVEL_OUTOF10: 0

## 2022-12-08 NOTE — INTERDISCIPLINARY ROUNDS
Spiritual Care Services     Summary of Visit:  This  participated in interdisciplinary rounds this morning. Patient was asleep. Patient's son was present. No AD noted in patient's chart or Epic. Following rounds, this  was able to spend a few minutes with the patient's son. 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 He is . Not Mormon. Son is concerned but hopeful for his dad's recovery. Says his dad is a great my! !    A daughter from [place] will be flying in today to be with her dad. Encounter Summary  Encounter Overview/Reason : Interdisciplinary rounds  Service Provided For[de-identified] Patient and family together  Referral/Consult From[de-identified] Rounding  Support System: Children, Family members  Last Encounter : 12/07/22  Complexity of Encounter: Low  Begin Time: 0930  End Time : 0935  Total Time Calculated: 5 min  Encounter   Type: Follow up     Spiritual/Emotional needs  Type: Spiritual Support                      Spiritual Assessment/Intervention/Outcomes:    Assessment: Unable to assess    Intervention: Sustaining Presence/Ministry of presence    Outcome: Did not respond      Care Plan:    Plan and Referrals  Plan/Referrals: Continue Support (comment)    Continue to provide support to family and patient. Inquire about AD. Spiritual Care Services   Electronically signed by Inga Dennis [facility] on 12/8/2022 at 10:54 AM.    To reach a  for emotional and spiritual support, place an [facility] consult request.   If a  is needed immediately, dial 0 and ask to page the on-call .

## 2022-12-08 NOTE — PLAN OF CARE
Nutrition Problem #1: Moderate malnutrition, In context of chronic illness  Intervention: Food and/or Nutrient Delivery: Start Tube Feeding  Nutritional

## 2022-12-08 NOTE — PROGRESS NOTES
Patient intubated and on small dose of fentanyl drip. ~0730 patient placed on CPAP by Dr. Daryle Horseman. Encouraging pt. To take deep breaths and slow down resp rate. Suctioning coious amounts of thick, yellow mucus from ETT. Pt. Calm and cooperative at this time. SR on monitor, BP stable, normal temp with rectal probe. Tolerating tube feed and flushes. Sherman in place. Family in room and pt. Attempting to sit up in bed and anxious. Educated pt. On relaxing to assist with passing SBT. Rounds done. ABG done after SBT, ordered to place pt. Back on vent. Pt. Continues to have large amounts of thick secretions from ETT. Family updated. No pressors needed so picc order d/c'd. Pt. On vent and fentanyl drip remainder of shift. Remains alert and following commands. PT. Tolerating increases in TF rate. Handoff report given to [employer].

## 2022-12-08 NOTE — PROGRESS NOTES
2000 Pt on vent. Eyes open when name is called. Follows commands. Moves x4. In soft wrist restraints bilaterally. Pt has small abrasion on bridge of nose. Pt on Fentanyl drip. Pt is in sinus bradycardia with rate 48-60.     0000 Pt tolerating tube feeds. Calm and cooperative. 0400 SaO2 starting to drop into the mid 80s. Fio2 increased to 50%. Unable to complete med rec. No family present and pt is sedated on ventilator. 0600 Suctioned per ET tube for large amounts thick yellow mucous. Condition report given to pts sister Judith Spence.

## 2022-12-08 NOTE — CARE COORDINATION
Quality round completed with care management team. Pt remain intubated. Son at bedside. Pt lives at home with grandson. DC plan: TBD at this time. LSW will follow.      Electronically signed by FRANCISCO Kelsey on 12/8/2022 at 10:58 AM

## 2022-12-08 NOTE — PROGRESS NOTES
Pulmonary & Critical Care Medicine ICU Progress Note  Chief complaint : Acute respiratory failure     Subjunctive/24 hour events :   Patient seen and examined during multidisciplinary rounds with RN, charge nurse, RT, pharmacy, dietitian, and social service. Patient remains on the vent, FiO2 is 40%, peep of 5, O2 sats are 692%. Currently on SBT, trying to get out of bed and following commands. He is sedated with fentanyl, required levophed yesterday but has remained off since yesterday afternoon. No fever overnight and urine output 1150 for 24 hours. Tolerating tube feeding and last BM 12/7/2022. Social History     Tobacco Use    Smoking status: Every Day     Packs/day: 1.00     Types: Cigarettes    Smokeless tobacco: Never   Substance Use Topics    Alcohol use: Never     No family history on file. Recent Labs     12/07/22  1245   PHART 7.281*   [id number] 67*   PO2ART 131*       MV Settings:  Vent Mode: CPAP/PS Resp Rate (Set): 16 bmp/Vt (Set, mL): 450 mL/ /FiO2 : 40 %           IV:   propofol 15 mcg/kg/min (12/07/22 1341)    dextrose      fentaNYL (SUBLIMAZE) infusion 75 mcg/hr (12/08/22 0616)    norepinephrine Stopped (12/07/22 1546)    sodium chloride      sodium chloride         Vitals:  BP (!) 140/77   Pulse 89   Temp 98.4 °F (36.9 °C) (Rectal)   Resp 27   Ht 5' 9\" (1.753 m)   Wt 138 lb 0.1 oz (62.6 kg)   SpO2 92%   BMI 20.38 kg/m²    Tmax:       Intake/Output Summary (Last 24 hours) at 12/8/2022 0854  Last data filed at 12/8/2022 0800  Gross per 24 hour   Intake 1788.99 ml   Output 900 ml   Net 888.99 ml       EXAM:    General: alert, cooperative  Head: normocephalic, atraumatic  Eyes:No gross abnormalities. ENT:  MMM no lesions  Neck:  supple and no masses  Chest : Fair air movement, occ rales no wheezes   Heart[de-identified] Heart sounds are normal.  Regular rate and rhythm without murmur, gallop or rub.   ABD:  bowel sounds normal, soft, non-tender  Musculoskeletal : no cyanosis, no clubbing, and no edema  Neuro:  Grossly normal  Skin: No rashes or nodules noted. Lymph node:  no cervical nodes  Urology: No [place]   Psychiatric: appropriate and anxious    Medications:  Scheduled Meds:   enoxaparin  40 mg SubCUTAneous Daily    chlorhexidine  15 mL Mouth/Throat BID    famotidine (PEPCID) injection  20 mg IntraVENous BID    insulin lispro  0-4 Units SubCUTAneous Q4H    methylPREDNISolone  40 mg IntraVENous Daily    levothyroxine  112 mcg Oral Daily    rosuvastatin  10 mg Oral Daily    piperacillin-tazobactam  3,375 mg IntraVENous Q8H    midodrine  10 mg Oral TID WC    docusate  100 mg Oral BID    nicotine  1 patch TransDERmal Daily    lidocaine  5 mL IntraDERmal Once    sodium chloride flush  5-40 mL IntraVENous 2 times per day       PRN Meds:  ondansetron **OR** ondansetron, polyethylene glycol, acetaminophen **OR** acetaminophen, glucose, dextrose bolus **OR** dextrose bolus, glucagon (rDNA), dextrose, sodium chloride flush, sodium chloride    Results: reviewed by me   CBC:   Recent Labs     12/06/22  1115 12/07/22  0338 12/07/22  1245 12/08/22  0445   WBC 10.8 5.7  --  7.6   HGB 13.2* 9.9* 11.7* 11.0*   HCT 41.8 32.0*  --  35.2*   MCV 83.4 83.9  --  82.8    192  --  213     BMP:   Recent Labs     12/06/22  1130 12/07/22  0338 12/07/22  1245 12/08/22  0445    139  --  138   K 4.4 4.2  4.2  --  3.8   CL 98 102  --  100   CO2 37* 32*  --  32*   BUN 19 25*  --  25*   CREATININE 0.61* 0.44* 0.6* 0.49*     LIVER PROFILE:   Recent Labs     12/07/22  0338 12/08/22  0445   AST 7 9   ALT <5 6   BILIDIR  --  <0.2   BILITOT <0.2 <0.2   ALKPHOS 48 48     PT/INR:   Recent Labs     12/08/22  0445   PROTIME 14.2   INR 1.1     APTT: No results for input(s): APTT in the last 72 hours.   UA:  Recent Labs     12/07/22  0514   COLORU Yellow   PHUR 5.5   WBCUA 3-5   RBCUA 20-50*   BACTERIA Negative   CLARITYU TURBID*   SPECGRAV 1.022   LEUKOCYTESUR Negative   UROBILINOGEN 0.2   BILIRUBINUR Negative   BLOODU MODERATE* GLUCOSEU Negative       Cultures:    XR CHEST PORTABLE    Result Date: 12/7/2022  EXAMINATION: ONE XRAY VIEW OF THE CHEST 12/7/2022 2:51 am COMPARISON: None. HISTORY: ORDERING SYSTEM PROVIDED HISTORY: ETT placement following transport TECHNOLOGIST PROVIDED HISTORY: Reason for exam:->ETT placement following transport What reading provider will be dictating this exam?->CRC FINDINGS: Endotracheal tube tip is at the thoracic inlet. NG tube is well within the gastric lumen. There increased markings in both lungs, left greater than right which are nonspecific but likely to relate to fibrosis. Normal heart and pulmonary vascularity. Increased lung markings which are nonspecific but likely related to fibrosis. Post endotracheal and nasogastric intubation. XR CHEST PORTABLE    Result Date: 12/6/2022  EXAM: XR CHEST PORTABLE HISTORY: Reason for exam:->tube placement COMPARISON: December 6, 2022 at 1056 hours TECHNIQUE: An AP portable supine view of the chest was obtained for which the lateral aspect of the right hemithorax was excluded FINDINGS: There is patchy airspace disease within the infrahilar regions, with central bronchial wall thickening. The heart size is within normal limits, with atherosclerotic vascular calcification within the aorta. There is an endotracheal tube in place with the tip 4.5 cm above the adilene. The osseous structures are stable. 1. Interval placement of an endotracheal tube. 2. Bilateral infrahilar airspace disease may be associated with pulmonary edema and/or pneumonia. Recommend clinical correlation. XR CHEST PORTABLE    Result Date: 12/6/2022  EXAMINATION: XR CHEST PORTABLE HISTORY: Reason for exam:->SOB COMPARISON: 1/9/2022 TECHNIQUE: AP portable FINDINGS: LUNGS: Bibasilar infiltrates, slightly increased from the prior exam. Increased interstitial lung markings likely chronic changes. Mild peribronchial thickening. VASCULATURE: No increased pulmonary vasculature.  PLEURA: No pneumothorax, effusion, or pleural thickening. CARDIAC: No cardiomegaly or cardiac silhouette abnormality. MEDIASTINUM: No visible mass or adenopathy. BONES: No fracture or visible bone lesion. OTHER: Negative. Mild increase in bibasilar infiltrates     Most recent    Chest CT      WITH CONTRAST:No results found for this or any previous visit. WITHOUT CONTRAST: Results for orders placed during the hospital encounter of 01/09/22    CT CHEST WO CONTRAST    Narrative  EXAMINATION: CT CHEST WO CONTRAST    HISTORY: Reason for exam:->evaluate for pneumonia is    COMPARISON: XR chest 1/9/2022      TECHNIQUE: Axial, Coronal, and Sagittal images were created without the  administration of IV contrast material. Dose reduction techniques were achieved  by using automated exposure control and/or adjustment of mA and/or kV according  to patient size and/or use of iterative reconstruction technique. FINDINGS:  LUNGS: Marked emphysematous changes. A few areas of mild patchy and strandy  opacities within the anterior lateral left lung base. Tiny faint patchy and  nodular opacities scattered within the lungs, most notable within the posterior  right lung base. 6 mm nodule within left upper lobe adjacent the hilum. PLEURA: No mass, effusion, or pneumothorax. VASCULATURE: No abnormality. ARIADNA: No mass or adenopathy. MEDIASTINUM: Mild lymphadenopathy. Calcified subcarinal lymph nodes. CARDIAC: Trace amount of pericardial fluid. No significant cardiac enlargement. AORTA: No aneurysm or dissection. CHEST WALL: No mass or axillary adenopathy. BONES: No bone lesion or fracture. LIMITED ABDOMEN: Innumerable small and large hypodense lesions throughout the  liver; larger lesions tend to measure fluid density. Multiple dense  calcifications within the liver. Numerous tiny stones layering within the  gallbladder. Bilateral adrenal gland hypertrophy.  Old incompletely healed  posterior lateral right eighth rib fracture. Old healed left rib fractures. Limited images of the upper abdomen. OTHER: Negative. Impression  1. Marked emphysematous changes. 2. Mild lingular infiltrates versus atelectasis. 3. Scattered tiny nodular and patchy opacities; nonspecific. Follow-up CT  imaging in 3 months is recommended to document stability. 4. Mild mediastinal lymphadenopathy; some of this may be reactive, but there is  also evidence of chronic granulomatous disease. 5. Innumerable hypodense lesions throughout the liver suspected represent  cysts, or possibly cysts and hemangiomas. Consider nonemergent multiphase CT  imaging of the liver for confirmation and to exclude a mass. 6. Cholelithiasis. CXR      2-view: No results found for this or any previous visit. Portable: Results for orders placed during the hospital encounter of 12/07/22    XR CHEST PORTABLE    Narrative  EXAMINATION:  ONE XRAY VIEW OF THE CHEST    12/7/2022 2:51 am    COMPARISON:  None. HISTORY:  ORDERING SYSTEM PROVIDED HISTORY: ETT placement following transport  TECHNOLOGIST PROVIDED HISTORY:  Reason for exam:->ETT placement following transport  What reading provider will be dictating this exam?->CRC    FINDINGS:  Endotracheal tube tip is at the thoracic inlet. NG tube is well within the  gastric lumen. There increased markings in both lungs, left greater than  right which are nonspecific but likely to relate to fibrosis. Normal heart  and pulmonary vascularity. Impression  Increased lung markings which are nonspecific but likely related to fibrosis. Post endotracheal and nasogastric intubation. Echo No results found for this or any previous visit. Assessment:   This is a critically ill patient at risk of deterioration / death , needing close ICU monitoring and intervention due to below noted problems   Acute on chronic hypoxic and hypercapnic respiratory failure   Adenovirus and RSV   COPD with exacerbation   Acute encephalopathy, improved   Diabetes     Recommendations   Vent support lung protective strategy  head of the bed 30°  Daily sedation holidays and breathing trials  Sedation with combination propofol and fentanyl target R ASS of 0 to -1  Failed SBT today, thick yellow secretions   Watch for ICU delirium: TV on, natural light, avoid benzos, pain control, early mobility, and family engagement  PUD prophylaxis  DVT prophylaxis   Continue solumedrol   Continue midodrine   Continue tube feeding   Maintain blood sugar 140-180  Monitor electrolytes and replace as needed                 Electronically signed by DALIA Valente - CNP,  FCCP ,on 12/8/2022 at 8:54 AM

## 2022-12-08 NOTE — PLAN OF CARE
Problem: Discharge Planning  Goal: Discharge to home or other facility with appropriate resources  12/7/2022 2356 by Rhianna Martinez RN  Outcome: Progressing  12/7/2022 1125 by Reshma Oliveros RN  Outcome: Progressing  Flowsheets (Taken 12/7/2022 0800)  Discharge to home or other facility with appropriate resources: Identify barriers to discharge with patient and caregiver     Problem: Safety - Medical Restraint  Goal: Remains free of injury from restraints (Restraint for Interference with Medical Device)  Description: INTERVENTIONS:  1. Determine that other, less restrictive measures have been tried or would not be effective before applying the restraint  2. Evaluate the patient's condition at the time of restraint application  3. Inform patient/family regarding the reason for restraint  4.  Q2H: Monitor safety, psychosocial status, comfort, nutrition and hydration  12/7/2022 2356 by Rhianna Martinez RN  Outcome: Progressing  Flowsheets (Taken 12/7/2022 1712 by Reshma Oliveros RN)  Remains free of injury from restraints (restraint for interference with medical device): Every 2 hours: Monitor safety, psychosocial status, comfort, nutrition and hydration  12/7/2022 1125 by Reshma Oliveros RN  Outcome: Progressing  Flowsheets  Taken 12/7/2022 0800 by Reshma Oliveros RN  Remains free of injury from restraints (restraint for interference with medical device): Every 2 hours: Monitor safety, psychosocial status, comfort, nutrition and hydration  Taken 12/7/2022 0315 by Maryjane Maher RN  Remains free of injury from restraints (restraint for interference with medical device): Every 2 hours: Monitor safety, psychosocial status, comfort, nutrition and hydration     Problem: Chronic Conditions and Co-morbidities  Goal: Patient's chronic conditions and co-morbidity symptoms are monitored and maintained or improved  12/7/2022 2356 by Rhianna Martinez RN  Outcome: Progressing  12/7/2022 1125 by Reshma Oliveros RN  Outcome: Progressing  Flowsheets (Taken 12/7/2022 0800)  Care Plan - Patient's Chronic Conditions and Co-Morbidity Symptoms are Monitored and Maintained or Improved: Monitor and assess patient's chronic conditions and comorbid symptoms for stability, deterioration, or improvement     Problem: Pain  Goal: Verbalizes/displays adequate comfort level or baseline comfort level  12/7/2022 2356 by Laura Story RN  Outcome: Progressing  12/7/2022 1125 by Valentin Thomas RN  Outcome: Progressing     Problem: Skin/Tissue Integrity  Goal: Absence of new skin breakdown  Description: 1. Monitor for areas of redness and/or skin breakdown  2. Assess vascular access sites hourly  3. Every 4-6 hours minimum:  Change oxygen saturation probe site  4. Every 4-6 hours:  If on nasal continuous positive airway pressure, respiratory therapy assess nares and determine need for appliance change or resting period.   12/7/2022 2356 by Laura Story RN  Outcome: Progressing  12/7/2022 1125 by Valentin Thomas RN  Outcome: Progressing     Problem: ABCDS Injury Assessment  Goal: Absence of physical injury  12/7/2022 2356 by Laura Story RN  Outcome: Progressing  12/7/2022 1125 by Valentin Thomas RN  Outcome: Progressing     Problem: Safety - Adult  Goal: Free from fall injury  12/7/2022 2356 by Laura Story RN  Outcome: Progressing  12/7/2022 1125 by Valentin Thomas RN  Outcome: Progressing   Continue plan of care

## 2022-12-08 NOTE — PLAN OF CARE
Problem: Discharge Planning  Goal: Discharge to home or other facility with appropriate resources  Outcome: Progressing  Flowsheets (Taken 12/8/2022 0800)  Discharge to home or other facility with appropriate resources: Identify barriers to discharge with patient and caregiver     Problem: Safety - Medical Restraint  Goal: Remains free of injury from restraints (Restraint for Interference with Medical Device)  Description: INTERVENTIONS:  1. Determine that other, less restrictive measures have been tried or would not be effective before applying the restraint  2. Evaluate the patient's condition at the time of restraint application  3. Inform patient/family regarding the reason for restraint  4. Q2H: Monitor safety, psychosocial status, comfort, nutrition and hydration  Outcome: Progressing  Flowsheets (Taken 12/8/2022 0800)  Remains free of injury from restraints (restraint for interference with medical device): Every 2 hours: Monitor safety, psychosocial status, comfort, nutrition and hydration     Problem: Chronic Conditions and Co-morbidities  Goal: Patient's chronic conditions and co-morbidity symptoms are monitored and maintained or improved  Outcome: Progressing  Flowsheets (Taken 12/8/2022 0800)  Care Plan - Patient's Chronic Conditions and Co-Morbidity Symptoms are Monitored and Maintained or Improved: Monitor and assess patient's chronic conditions and comorbid symptoms for stability, deterioration, or improvement     Problem: Pain  Goal: Verbalizes/displays adequate comfort level or baseline comfort level  Outcome: Progressing  Flowsheets (Taken 12/8/2022 0800)  Verbalizes/displays adequate comfort level or baseline comfort level: Encourage patient to monitor pain and request assistance     Problem: Skin/Tissue Integrity  Goal: Absence of new skin breakdown  Description: 1. Monitor for areas of redness and/or skin breakdown  2. Assess vascular access sites hourly  3.   Every 4-6 hours minimum:  Change oxygen saturation probe site  4. Every 4-6 hours:  If on nasal continuous positive airway pressure, respiratory therapy assess nares and determine need for appliance change or resting period.   Outcome: Progressing     Problem: ABCDS Injury Assessment  Goal: Absence of physical injury  Outcome: Progressing     Problem: Safety - Adult  Goal: Free from fall injury  Outcome: Progressing     Problem: Neurosensory - Adult  Goal: Achieves stable or improved neurological status  Outcome: Progressing  Goal: Achieves maximal functionality and self care  Outcome: Progressing     Problem: Respiratory - Adult  Goal: Achieves optimal ventilation and oxygenation  Outcome: Progressing  Flowsheets (Taken 12/8/2022 0800)  Achieves optimal ventilation and oxygenation: Assess for changes in respiratory status     Problem: Cardiovascular - Adult  Goal: Maintains optimal cardiac output and hemodynamic stability  Outcome: Progressing  Goal: Absence of cardiac dysrhythmias or at baseline  Outcome: Progressing     Problem: Skin/Tissue Integrity - Adult  Goal: Skin integrity remains intact  Outcome: Progressing  Flowsheets (Taken 12/8/2022 0800)  Skin Integrity Remains Intact: Monitor for areas of redness and/or skin breakdown  Goal: Oral mucous membranes remain intact  Outcome: Progressing  Flowsheets (Taken 12/8/2022 0800)  Oral Mucous Membranes Remain Intact: Assess oral mucosa and hygiene practices     Problem: Musculoskeletal - Adult  Goal: Return mobility to safest level of function  Outcome: Progressing  Flowsheets (Taken 12/8/2022 0800)  Return Mobility to Safest Level of Function: Obtain physical therapy/occupational therapy consults as needed  Goal: Return ADL status to a safe level of function  Outcome: Progressing  Flowsheets (Taken 12/8/2022 0800)  Return ADL Status to a Safe Level of Function: Assess activities of daily living deficits and provide assistive devices as needed     Problem: Gastrointestinal - Adult  Goal: Minimal or absence of nausea and vomiting  Outcome: Progressing  Goal: Maintains or returns to baseline bowel function  Outcome: Progressing  Goal: Maintains adequate nutritional intake  Outcome: Progressing     Problem: Genitourinary - Adult  Goal: Absence of urinary retention  Outcome: Progressing  Flowsheets (Taken 12/8/2022 0800)  Absence of urinary retention: Monitor intake/output and perform bladder scan as needed     Problem: Infection - Adult  Goal: Absence of infection at discharge  Outcome: Progressing  Flowsheets (Taken 12/8/2022 0800)  Absence of infection at discharge: Assess and monitor for signs and symptoms of infection     Problem: Metabolic/Fluid and Electrolytes - Adult  Goal: Electrolytes maintained within normal limits  Outcome: Progressing  Flowsheets (Taken 12/8/2022 0800)  Electrolytes maintained within normal limits: Monitor labs and assess patient for signs and symptoms of electrolyte imbalances  Goal: Hemodynamic stability and optimal renal function maintained  Outcome: Progressing  Flowsheets (Taken 12/8/2022 0800)  Hemodynamic stability and optimal renal function maintained: Monitor labs and assess for signs and symptoms of volume excess or deficit  Goal: Glucose maintained within prescribed range  Outcome: Progressing  Flowsheets (Taken 12/8/2022 0800)  Glucose maintained within prescribed range: Monitor blood glucose as ordered     Problem: Hematologic - Adult  Goal: Maintains hematologic stability  Outcome: Progressing  Flowsheets (Taken 12/8/2022 0800)  Maintains hematologic stability: Assess for signs and symptoms of bleeding or hemorrhage     Problem: Coping  Goal: Pt/Family able to verbalize concerns and demonstrate effective coping strategies  Description: INTERVENTIONS:  1. Assist patient/family to identify coping skills, available support systems and cultural and spiritual values  2.  Provide emotional support, including active listening and acknowledgement of concerns of patient and caregivers  3. Reduce environmental stimuli, as able  4. Instruct patient/family in relaxation techniques, as appropriate  5.  Assess for spiritual pain/suffering and initiate Spiritual Care, Psychosocial Clinical Specialist consults as needed  Outcome: Progressing  Flowsheets (Taken 12/8/2022 0800)  Patient/family able to verbalize anxieties, fears, and concerns, and demonstrate effective coping: Assist patient/family to identify coping skills, available support systems and cultural and spiritual values     Problem: Nutrition Deficit:  Goal: Optimize nutritional status  12/8/2022 1432 by Al Leigh RN  Outcome: Progressing  12/8/2022 1421 by Marychuy Menendez RD, LD  Flowsheets (Taken 12/8/2022 1421)  Nutrient intake appropriate for improving, restoring, or maintaining nutritional needs:   Assess nutritional status and recommend course of action   Monitor oral intake, labs, and treatment plans   Recommend, monitor, and adjust tube feedings and TPN/PPN based on assessed needs

## 2022-12-08 NOTE — PROGRESS NOTES
Hospitalist Progress Note      Date of Admission: 12/7/2022  Chief Complaint:    No chief complaint on file. Subjective:   Intubated, sedated    Medications:    Infusion Medications    propofol Stopped (12/07/22 1530)    dextrose      fentaNYL (SUBLIMAZE) infusion 75 mcg/hr (12/08/22 1539)    sodium chloride      sodium chloride       Scheduled Medications    enoxaparin  40 mg SubCUTAneous Daily    chlorhexidine  15 mL Mouth/Throat BID    famotidine (PEPCID) injection  20 mg IntraVENous BID    insulin lispro  0-4 Units SubCUTAneous Q4H    methylPREDNISolone  40 mg IntraVENous Daily    levothyroxine  112 mcg Oral Daily    rosuvastatin  10 mg Oral Daily    midodrine  10 mg Oral TID WC    docusate  100 mg Oral BID    nicotine  1 patch TransDERmal Daily    lidocaine  5 mL IntraDERmal Once    sodium chloride flush  5-40 mL IntraVENous 2 times per day     PRN Meds: ondansetron **OR** ondansetron, polyethylene glycol, acetaminophen **OR** acetaminophen, glucose, dextrose bolus **OR** dextrose bolus, glucagon (rDNA), dextrose, sodium chloride flush, sodium chloride    Intake/Output Summary (Last 24 hours) at 12/8/2022 1702  Last data filed at 12/8/2022 0800  Gross per 24 hour   Intake 1646.11 ml   Output 900 ml   Net 746.11 ml     Exam:  /66   Pulse 69   Temp 99 °F (37.2 °C) (Rectal)   Resp 21   Ht 5' 9\" (1.753 m)   Wt 138 lb 0.1 oz (62.6 kg)   SpO2 93%   BMI 20.38 kg/m²   Head: Normocephalic, atraumatic  Sclera clear  Neck JVD flat  Lungs: Few scattered crackles, normal effort of breathing    Labs:   Recent Labs     12/06/22  1115 12/07/22  0338 12/07/22  1245 12/08/22  0445 12/08/22  0942   WBC 10.8 5.7  --  7.6  --    HGB 13.2* 9.9* 11.7* 11.0* 13.4*   HCT 41.8 32.0*  --  35.2*  --     192  --  213  --      Recent Labs     12/06/22  1130 12/07/22  0338 12/07/22  1245 12/08/22  0445 12/08/22  0942    139  --  138  --    K 4.4 4.2  4.2  --  3.8  --    CL 98 102  --  100  --    CO2 37* 32*  -- 32*  --    BUN 19 25*  --  25*  --    CREATININE 0.61* 0.44* 0.6* 0.49* 0.7*   CALCIUM 9.8 8.5  --  9.0  --    AST  --  7  --  9  --    ALT  --  <5  --  6  --    BILIDIR  --   --   --  <0.2  --    BILITOT  --  <0.2  --  <0.2  --    ALKPHOS  --  48  --  48  --      Recent Labs     12/08/22  0445   INR 1.1     Recent Labs     12/07/22  1031 12/07/22  1626 12/07/22  2148   TROPONINI <0.010 <0.010 <0.010     Radiology:  XR CHEST PORTABLE   Final Result   Increased lung markings which are nonspecific but likely related to fibrosis. Post endotracheal and nasogastric intubation. Assessment/Plan:    Acute on chronic hypoxic and hypercapnic respiratory failure secondary to COPD exacerbation from viral infection as evidenced by RSV and adenovirus positive serology. COPD exacerbation: Steroids, bronchodilators. Currently vent supported. Hypercapnic, slightly acidotic. Following with pulmonology. Community-acquired pneumonia: Covering for bacterial pneumonia with IV antibiotics in coordination with pulmonology. Hypothyroidism: Continue medication.   Hyperlipidemia: Continue rosuvastatin    35 minutes total care time, >1/2 in unit/floor time and care coordination       Elvie Garrison MD ,MD

## 2022-12-09 LAB
ANION GAP SERPL CALCULATED.3IONS-SCNC: 6 MEQ/L (ref 9–15)
BASE EXCESS ARTERIAL: 9 (ref -3–3)
BASOPHILS ABSOLUTE: 0 K/UL (ref 0–0.2)
BASOPHILS RELATIVE PERCENT: 0.3 %
BUN BLDV-MCNC: 20 MG/DL (ref 8–23)
CALCIUM IONIZED: 1.32 MMOL/L (ref 1.12–1.32)
CALCIUM SERPL-MCNC: 9 MG/DL (ref 8.5–9.9)
CHLORIDE BLD-SCNC: 100 MEQ/L (ref 95–107)
CO2: 31 MEQ/L (ref 20–31)
CREAT SERPL-MCNC: 0.43 MG/DL (ref 0.7–1.2)
CULTURE, RESPIRATORY: NORMAL
EKG ATRIAL RATE: 312 BPM
EKG Q-T INTERVAL: 508 MS
EKG QRS DURATION: 94 MS
EKG QTC CALCULATION (BAZETT): 419 MS
EKG R AXIS: 80 DEGREES
EKG T AXIS: 59 DEGREES
EKG VENTRICULAR RATE: 41 BPM
EOSINOPHILS ABSOLUTE: 0 K/UL (ref 0–0.7)
EOSINOPHILS RELATIVE PERCENT: 0.2 %
GFR SERPL CREATININE-BSD FRML MDRD: >60 ML/MIN/{1.73_M2}
GFR SERPL CREATININE-BSD FRML MDRD: >60 ML/MIN/{1.73_M2}
GLUCOSE BLD-MCNC: 105 MG/DL (ref 70–99)
GLUCOSE BLD-MCNC: 107 MG/DL (ref 70–99)
GLUCOSE BLD-MCNC: 127 MG/DL (ref 70–99)
GLUCOSE BLD-MCNC: 133 MG/DL (ref 70–99)
GLUCOSE BLD-MCNC: 151 MG/DL (ref 70–99)
GLUCOSE BLD-MCNC: 167 MG/DL (ref 70–99)
GLUCOSE BLD-MCNC: 92 MG/DL (ref 70–99)
HCO3 ARTERIAL: 34.8 MMOL/L (ref 21–29)
HCT VFR BLD CALC: 35.2 % (ref 42–52)
HEMOGLOBIN: 10.9 G/DL (ref 14–18)
HEMOGLOBIN: 13.4 GM/DL (ref 13.5–17.5)
L. PNEUMOPHILA SEROGP 1 UR AG: NEGATIVE
LACTATE: 0.45 MMOL/L (ref 0.4–2)
LYMPHOCYTES ABSOLUTE: 0.6 K/UL (ref 1–4.8)
LYMPHOCYTES RELATIVE PERCENT: 7.4 %
MCH RBC QN AUTO: 25.7 PG (ref 27–31.3)
MCHC RBC AUTO-ENTMCNC: 31.1 % (ref 33–37)
MCV RBC AUTO: 82.7 FL (ref 79–92.2)
MONOCYTES ABSOLUTE: 0.8 K/UL (ref 0.2–0.8)
MONOCYTES RELATIVE PERCENT: 9.9 %
NEUTROPHILS ABSOLUTE: 6.5 K/UL (ref 1.4–6.5)
NEUTROPHILS RELATIVE PERCENT: 82.2 %
O2 SAT, ARTERIAL: 96 % (ref 93–100)
PCO2 ARTERIAL: 68 MM HG (ref 35–45)
PDW BLD-RTO: 15.7 % (ref 11.5–14.5)
PERFORMED ON: ABNORMAL
PERFORMED ON: NORMAL
PH ARTERIAL: 7.32 (ref 7.35–7.45)
PLATELET # BLD: 215 K/UL (ref 130–400)
PO2 ARTERIAL: 96 MM HG (ref 75–108)
POC CHLORIDE: 98 MEQ/L (ref 99–110)
POC CREATININE: 0.6 MG/DL (ref 0.8–1.3)
POC FIO2: 55
POC HEMATOCRIT: 39 % (ref 41–53)
POC POTASSIUM: 3.9 MEQ/L (ref 3.5–5.1)
POC SAMPLE TYPE: ABNORMAL
POC SODIUM: 140 MEQ/L (ref 136–145)
POTASSIUM REFLEX MAGNESIUM: 4.3 MEQ/L (ref 3.4–4.9)
PROCALCITONIN: 0.06 NG/ML (ref 0–0.15)
RBC # BLD: 4.25 M/UL (ref 4.7–6.1)
SODIUM BLD-SCNC: 137 MEQ/L (ref 135–144)
STREP PNEUMO AG, UR: POSITIVE
TCO2 ARTERIAL: 37 MMOL/L (ref 21–32)
WBC # BLD: 7.9 K/UL (ref 4.8–10.8)

## 2022-12-09 PROCEDURE — 93010 ELECTROCARDIOGRAM REPORT: CPT | Performed by: INTERNAL MEDICINE

## 2022-12-09 PROCEDURE — 85014 HEMATOCRIT: CPT

## 2022-12-09 PROCEDURE — 2580000003 HC RX 258: Performed by: INTERNAL MEDICINE

## 2022-12-09 PROCEDURE — 83605 ASSAY OF LACTIC ACID: CPT

## 2022-12-09 PROCEDURE — 82330 ASSAY OF CALCIUM: CPT

## 2022-12-09 PROCEDURE — 82803 BLOOD GASES ANY COMBINATION: CPT

## 2022-12-09 PROCEDURE — 6370000000 HC RX 637 (ALT 250 FOR IP): Performed by: INTERNAL MEDICINE

## 2022-12-09 PROCEDURE — 82565 ASSAY OF CREATININE: CPT

## 2022-12-09 PROCEDURE — 80048 BASIC METABOLIC PNL TOTAL CA: CPT

## 2022-12-09 PROCEDURE — 6360000002 HC RX W HCPCS: Performed by: INTERNAL MEDICINE

## 2022-12-09 PROCEDURE — 99291 CRITICAL CARE FIRST HOUR: CPT | Performed by: INTERNAL MEDICINE

## 2022-12-09 PROCEDURE — 2700000000 HC OXYGEN THERAPY PER DAY

## 2022-12-09 PROCEDURE — 84132 ASSAY OF SERUM POTASSIUM: CPT

## 2022-12-09 PROCEDURE — 84295 ASSAY OF SERUM SODIUM: CPT

## 2022-12-09 PROCEDURE — 82435 ASSAY OF BLOOD CHLORIDE: CPT

## 2022-12-09 PROCEDURE — 36415 COLL VENOUS BLD VENIPUNCTURE: CPT

## 2022-12-09 PROCEDURE — 82948 REAGENT STRIP/BLOOD GLUCOSE: CPT

## 2022-12-09 PROCEDURE — 2000000000 HC ICU R&B

## 2022-12-09 PROCEDURE — 2500000003 HC RX 250 WO HCPCS: Performed by: INTERNAL MEDICINE

## 2022-12-09 PROCEDURE — 94003 VENT MGMT INPAT SUBQ DAY: CPT

## 2022-12-09 PROCEDURE — A4216 STERILE WATER/SALINE, 10 ML: HCPCS | Performed by: INTERNAL MEDICINE

## 2022-12-09 PROCEDURE — 84145 PROCALCITONIN (PCT): CPT

## 2022-12-09 PROCEDURE — 94660 CPAP INITIATION&MGMT: CPT

## 2022-12-09 PROCEDURE — 85025 COMPLETE CBC W/AUTO DIFF WBC: CPT

## 2022-12-09 RX ADMIN — ACETAMINOPHEN 650 MG: 325 TABLET ORAL at 07:53

## 2022-12-09 RX ADMIN — MIDODRINE HYDROCHLORIDE 10 MG: 5 TABLET ORAL at 16:48

## 2022-12-09 RX ADMIN — FAMOTIDINE 20 MG: 10 INJECTION INTRAVENOUS at 07:52

## 2022-12-09 RX ADMIN — CEFTRIAXONE SODIUM 1000 MG: 1 INJECTION, POWDER, FOR SOLUTION INTRAMUSCULAR; INTRAVENOUS at 14:21

## 2022-12-09 RX ADMIN — LEVOTHYROXINE SODIUM 112 MCG: 112 TABLET ORAL at 07:53

## 2022-12-09 RX ADMIN — MIDODRINE HYDROCHLORIDE 10 MG: 5 TABLET ORAL at 07:53

## 2022-12-09 RX ADMIN — DOCUSATE SODIUM 100 MG: 50 LIQUID ORAL at 07:53

## 2022-12-09 RX ADMIN — METHYLPREDNISOLONE SODIUM SUCCINATE 40 MG: 40 INJECTION, POWDER, FOR SOLUTION INTRAMUSCULAR; INTRAVENOUS at 07:53

## 2022-12-09 RX ADMIN — Medication 10 ML: at 10:21

## 2022-12-09 RX ADMIN — 0.12% CHLORHEXIDINE GLUCONATE 15 ML: 1.2 RINSE ORAL at 07:53

## 2022-12-09 RX ADMIN — Medication 10 ML: at 20:21

## 2022-12-09 RX ADMIN — ROSUVASTATIN CALCIUM 10 MG: 20 TABLET, FILM COATED ORAL at 07:53

## 2022-12-09 RX ADMIN — FENTANYL CITRATE 75 MCG/HR: 0.05 INJECTION, SOLUTION INTRAMUSCULAR; INTRAVENOUS at 05:05

## 2022-12-09 RX ADMIN — DOCUSATE SODIUM 100 MG: 50 LIQUID ORAL at 20:01

## 2022-12-09 RX ADMIN — FAMOTIDINE 20 MG: 10 INJECTION INTRAVENOUS at 20:01

## 2022-12-09 RX ADMIN — ENOXAPARIN SODIUM 40 MG: 100 INJECTION SUBCUTANEOUS at 07:52

## 2022-12-09 ASSESSMENT — PULMONARY FUNCTION TESTS
PIF_VALUE: 19
PIF_VALUE: 27
PIF_VALUE: 27
PIF_VALUE: 12
PIF_VALUE: 22
PIF_VALUE: 21
PIF_VALUE: 22
PIF_VALUE: 17
PIF_VALUE: 20
PIF_VALUE: 18
PIF_VALUE: 12
PIF_VALUE: 24
PIF_VALUE: 22
PIF_VALUE: 12

## 2022-12-09 ASSESSMENT — PAIN SCALES - GENERAL
PAINLEVEL_OUTOF10: 0

## 2022-12-09 NOTE — PLAN OF CARE
Problem: Discharge Planning  Goal: Discharge to home or other facility with appropriate resources  12/8/2022 2351 by Karen Atkins RN  Outcome: Progressing  12/8/2022 1432 by Hernandez Bentley RN  Outcome: Progressing  Flowsheets (Taken 12/8/2022 0800)  Discharge to home or other facility with appropriate resources: Identify barriers to discharge with patient and caregiver     Problem: Safety - Medical Restraint  Goal: Remains free of injury from restraints (Restraint for Interference with Medical Device)  Description: INTERVENTIONS:  1. Determine that other, less restrictive measures have been tried or would not be effective before applying the restraint  2. Evaluate the patient's condition at the time of restraint application  3. Inform patient/family regarding the reason for restraint  4.  Q2H: Monitor safety, psychosocial status, comfort, nutrition and hydration  12/8/2022 2351 by Karen Atkins RN  Outcome: Progressing  12/8/2022 1432 by Hernandez Bentley RN  Outcome: Progressing  Flowsheets (Taken 12/8/2022 0800)  Remains free of injury from restraints (restraint for interference with medical device): Every 2 hours: Monitor safety, psychosocial status, comfort, nutrition and hydration     Problem: Chronic Conditions and Co-morbidities  Goal: Patient's chronic conditions and co-morbidity symptoms are monitored and maintained or improved  12/8/2022 2351 by Karen Atkins RN  Outcome: Progressing  12/8/2022 1432 by Hernandez Bentley RN  Outcome: Progressing  Flowsheets (Taken 12/8/2022 0800)  Care Plan - Patient's Chronic Conditions and Co-Morbidity Symptoms are Monitored and Maintained or Improved: Monitor and assess patient's chronic conditions and comorbid symptoms for stability, deterioration, or improvement     Problem: Pain  Goal: Verbalizes/displays adequate comfort level or baseline comfort level  12/8/2022 2351 by Karen Atkins RN  Outcome: Progressing  12/8/2022 1432 by Hernandez Bentley RN  Outcome: Progressing  Flowsheets (Taken 12/8/2022 0800)  Verbalizes/displays adequate comfort level or baseline comfort level: Encourage patient to monitor pain and request assistance     Problem: Skin/Tissue Integrity  Goal: Absence of new skin breakdown  Description: 1. Monitor for areas of redness and/or skin breakdown  2. Assess vascular access sites hourly  3. Every 4-6 hours minimum:  Change oxygen saturation probe site  4. Every 4-6 hours:  If on nasal continuous positive airway pressure, respiratory therapy assess nares and determine need for appliance change or resting period.   12/8/2022 2351 by Rhianna Martinez RN  Outcome: Progressing  12/8/2022 1432 by Reshma Oliveros RN  Outcome: Progressing     Problem: ABCDS Injury Assessment  Goal: Absence of physical injury  12/8/2022 2351 by Rhianna Martinez RN  Outcome: Progressing  12/8/2022 1432 by Reshma Oliveros RN  Outcome: Progressing     Problem: Safety - Adult  Goal: Free from fall injury  12/8/2022 2351 by Rhianna Martinez RN  Outcome: Progressing  12/8/2022 1432 by Reshma Oliveros RN  Outcome: Progressing     Problem: Neurosensory - Adult  Goal: Achieves stable or improved neurological status  12/8/2022 2351 by Rhianna Martinez RN  Outcome: Progressing  12/8/2022 1432 by Reshma Oliveros RN  Outcome: Progressing  Goal: Achieves maximal functionality and self care  12/8/2022 2351 by Rhianna Martinez RN  Outcome: Progressing  12/8/2022 1432 by Reshma Oliveros RN  Outcome: Progressing     Problem: Respiratory - Adult  Goal: Achieves optimal ventilation and oxygenation  12/8/2022 2351 by Rhianna Martinez RN  Outcome: Progressing  12/8/2022 1432 by Reshma Oliveros RN  Outcome: Progressing  Flowsheets (Taken 12/8/2022 0800)  Achieves optimal ventilation and oxygenation: Assess for changes in respiratory status     Problem: Cardiovascular - Adult  Goal: Maintains optimal cardiac output and hemodynamic stability  12/8/2022 2351 by Mei Rhodes RN  Outcome: Progressing  12/8/2022 1432 by Bull Hankins RN  Outcome: Progressing  Goal: Absence of cardiac dysrhythmias or at baseline  12/8/2022 2351 by Mei Rhodes RN  Outcome: Progressing  12/8/2022 1432 by Bull Hankins RN  Outcome: Progressing     Problem: Skin/Tissue Integrity - Adult  Goal: Skin integrity remains intact  12/8/2022 2351 by Mei Rhodes RN  Outcome: Progressing  12/8/2022 1432 by Bull Hankins RN  Outcome: Progressing  Flowsheets (Taken 12/8/2022 0800)  Skin Integrity Remains Intact: Monitor for areas of redness and/or skin breakdown  Goal: Oral mucous membranes remain intact  12/8/2022 2351 by Mei Rhodes RN  Outcome: Progressing  12/8/2022 1432 by Bull Hankins RN  Outcome: Progressing  Flowsheets (Taken 12/8/2022 0800)  Oral Mucous Membranes Remain Intact: Assess oral mucosa and hygiene practices     Problem: Musculoskeletal - Adult  Goal: Return mobility to safest level of function  12/8/2022 2351 by Mei Rhodes RN  Outcome: Progressing  12/8/2022 1432 by Bull Hankins RN  Outcome: Progressing  Flowsheets (Taken 12/8/2022 0800)  Return Mobility to Safest Level of Function: Obtain physical therapy/occupational therapy consults as needed  Goal: Return ADL status to a safe level of function  12/8/2022 2351 by Mei Rhodes RN  Outcome: Progressing  12/8/2022 1432 by Bull Hankins RN  Outcome: Progressing  Flowsheets (Taken 12/8/2022 0800)  Return ADL Status to a Safe Level of Function: Assess activities of daily living deficits and provide assistive devices as needed     Problem: Gastrointestinal - Adult  Goal: Minimal or absence of nausea and vomiting  12/8/2022 2351 by Mei Rhodes RN  Outcome: Progressing  12/8/2022 1432 by Bull Hankins RN  Outcome: Progressing  Goal: Maintains or returns to baseline bowel function  12/8/2022 2351 by Mei Rhodes RN  Outcome: Progressing  12/8/2022 1432 by [facility] Maria Isabel Carroll RN  Outcome: Progressing  Goal: Maintains adequate nutritional intake  12/8/2022 2351 by Praneeth Muñoz RN  Outcome: Progressing  12/8/2022 1432 by Parisa Fay RN  Outcome: Progressing     Problem: Genitourinary - Adult  Goal: Absence of urinary retention  12/8/2022 2351 by Praneeth Muñoz RN  Outcome: Progressing  12/8/2022 1432 by Parisa Fay RN  Outcome: Progressing  Flowsheets (Taken 12/8/2022 0800)  Absence of urinary retention: Monitor intake/output and perform bladder scan as needed     Problem: Infection - Adult  Goal: Absence of infection at discharge  12/8/2022 2351 by Praneeth Muñoz RN  Outcome: Progressing  12/8/2022 1432 by Parisa Fay RN  Outcome: Progressing  Flowsheets (Taken 12/8/2022 0800)  Absence of infection at discharge: Assess and monitor for signs and symptoms of infection     Problem: Metabolic/Fluid and Electrolytes - Adult  Goal: Electrolytes maintained within normal limits  12/8/2022 2351 by Praneeth Muñoz RN  Outcome: Progressing  12/8/2022 1432 by Parisa Fay RN  Outcome: Progressing  Flowsheets (Taken 12/8/2022 0800)  Electrolytes maintained within normal limits: Monitor labs and assess patient for signs and symptoms of electrolyte imbalances  Goal: Hemodynamic stability and optimal renal function maintained  12/8/2022 2351 by Praneeth Muñoz RN  Outcome: Progressing  12/8/2022 1432 by Parisa Fay RN  Outcome: Progressing  Flowsheets (Taken 12/8/2022 0800)  Hemodynamic stability and optimal renal function maintained: Monitor labs and assess for signs and symptoms of volume excess or deficit  Goal: Glucose maintained within prescribed range  12/8/2022 2351 by Praneeth Muñoz RN  Outcome: Progressing  12/8/2022 1432 by Parisa Fay RN  Outcome: Progressing  Flowsheets (Taken 12/8/2022 0800)  Glucose maintained within prescribed range: Monitor blood glucose as ordered     Problem: Hematologic - Adult  Goal: Maintains hematologic stability  12/8/2022 2351 by Oksana Davis RN  Outcome: Progressing  12/8/2022 1432 by Joaquim Sorenson RN  Outcome: Progressing  Flowsheets (Taken 12/8/2022 0800)  Maintains hematologic stability: Assess for signs and symptoms of bleeding or hemorrhage     Problem: Coping  Goal: Pt/Family able to verbalize concerns and demonstrate effective coping strategies  Description: INTERVENTIONS:  1. Assist patient/family to identify coping skills, available support systems and cultural and spiritual values  2. Provide emotional support, including active listening and acknowledgement of concerns of patient and caregivers  3. Reduce environmental stimuli, as able  4. Instruct patient/family in relaxation techniques, as appropriate  5.  Assess for spiritual pain/suffering and initiate Spiritual Care, Psychosocial Clinical Specialist consults as needed  12/8/2022 2351 by Oksana Davis RN  Outcome: Progressing  12/8/2022 1432 by Joaquim Sorenson RN  Outcome: Progressing  Flowsheets (Taken 12/8/2022 0800)  Patient/family able to verbalize anxieties, fears, and concerns, and demonstrate effective coping: Assist patient/family to identify coping skills, available support systems and cultural and spiritual values

## 2022-12-09 NOTE — INTERDISCIPLINARY ROUNDS
Spiritual Care Services     Summary of Visit:  This  participated in morning interdisciplinary rounds. No family present. Patient remains intubated and sedated. No AD in chart or Epic. Encounter Summary  Encounter Overview/Reason : Interdisciplinary rounds  Service Provided For[de-identified] Patient  Referral/Consult From[de-identified] Rounding  Support System: Children  Last Encounter : 12/08/22  Complexity of Encounter: Low  Begin Time: 2318  End Time : 6447  Total Time Calculated: 5 min  Encounter   Type: Follow up     Spiritual/Emotional needs  Type: Spiritual Support                      Spiritual Assessment/Intervention/Outcomes:    Assessment: Unable to assess (Remains intubated and sedated)    Intervention: Sustaining Presence/Ministry of presence    Outcome: Did not respond      Care Plan:    Plan and Referrals  Plan/Referrals: Continue to visit, (comment)    Continue to support patient and family. Spiritual Care Services   Electronically signed by Wan Amaral, [address] [employer] on 12/9/2022 at 3:39 PM.    To reach a  for emotional and spiritual support, place an [facility] [place] consult request.   If a  is needed immediately, dial 0 and ask to page the on-call .

## 2022-12-09 NOTE — PLAN OF CARE
Problem: Discharge Planning  Goal: Discharge to home or other facility with appropriate resources  Outcome: Progressing  Flowsheets (Taken 12/9/2022 0800)  Discharge to home or other facility with appropriate resources: Identify barriers to discharge with patient and caregiver     Problem: Chronic Conditions and Co-morbidities  Goal: Patient's chronic conditions and co-morbidity symptoms are monitored and maintained or improved  Outcome: Progressing  Flowsheets (Taken 12/9/2022 0800)  Care Plan - Patient's Chronic Conditions and Co-Morbidity Symptoms are Monitored and Maintained or Improved: Monitor and assess patient's chronic conditions and comorbid symptoms for stability, deterioration, or improvement     Problem: Pain  Goal: Verbalizes/displays adequate comfort level or baseline comfort level  Outcome: Progressing  Flowsheets (Taken 12/9/2022 0800)  Verbalizes/displays adequate comfort level or baseline comfort level: Assess pain using appropriate pain scale     Problem: Skin/Tissue Integrity  Goal: Absence of new skin breakdown  Description: 1. Monitor for areas of redness and/or skin breakdown  2. Assess vascular access sites hourly  3. Every 4-6 hours minimum:  Change oxygen saturation probe site  4. Every 4-6 hours:  If on nasal continuous positive airway pressure, respiratory therapy assess nares and determine need for appliance change or resting period.   Outcome: Progressing     Problem: ABCDS Injury Assessment  Goal: Absence of physical injury  Outcome: Progressing     Problem: Safety - Adult  Goal: Free from fall injury  Outcome: Progressing     Problem: Neurosensory - Adult  Goal: Achieves stable or improved neurological status  Outcome: Progressing  Flowsheets (Taken 12/9/2022 0800)  Achieves stable or improved neurological status: Assess for and report changes in neurological status  Goal: Achieves maximal functionality and self care  Outcome: Progressing  Flowsheets (Taken 12/9/2022 0800)  Achieves maximal functionality and self care: Monitor swallowing and airway patency with patient fatigue and changes in neurological status     Problem: Respiratory - Adult  Goal: Achieves optimal ventilation and oxygenation  Outcome: Progressing  Flowsheets (Taken 12/9/2022 0800)  Achieves optimal ventilation and oxygenation: Assess for changes in respiratory status     Problem: Cardiovascular - Adult  Goal: Maintains optimal cardiac output and hemodynamic stability  Outcome: Progressing  Flowsheets (Taken 12/9/2022 0800)  Maintains optimal cardiac output and hemodynamic stability: Monitor blood pressure and heart rate  Goal: Absence of cardiac dysrhythmias or at baseline  Outcome: Progressing  Flowsheets (Taken 12/9/2022 0800)  Absence of cardiac dysrhythmias or at baseline: Monitor cardiac rate and rhythm     Problem: Skin/Tissue Integrity - Adult  Goal: Skin integrity remains intact  Outcome: Progressing  Flowsheets (Taken 12/9/2022 0800)  Skin Integrity Remains Intact: Monitor for areas of redness and/or skin breakdown  Goal: Oral mucous membranes remain intact  Outcome: Progressing  Flowsheets (Taken 12/9/2022 0800)  Oral Mucous Membranes Remain Intact: Assess oral mucosa and hygiene practices     Problem: Musculoskeletal - Adult  Goal: Return mobility to safest level of function  Outcome: Progressing  Flowsheets (Taken 12/9/2022 0800)  Return Mobility to Safest Level of Function: Obtain physical therapy/occupational therapy consults as needed  Goal: Return ADL status to a safe level of function  Outcome: Progressing  Flowsheets (Taken 12/9/2022 0800)  Return ADL Status to a Safe Level of Function: Administer medication as ordered     Problem: Gastrointestinal - Adult  Goal: Minimal or absence of nausea and vomiting  Outcome: Progressing  Flowsheets (Taken 12/9/2022 0800)  Minimal or absence of nausea and vomiting: Provide nonpharmacologic comfort measures as appropriate  Goal: Maintains or returns to strategies  Description: INTERVENTIONS:  1. Assist patient/family to identify coping skills, available support systems and cultural and spiritual values  2. Provide emotional support, including active listening and acknowledgement of concerns of patient and caregivers  3. Reduce environmental stimuli, as able  4. Instruct patient/family in relaxation techniques, as appropriate  5.  Assess for spiritual pain/suffering and initiate Spiritual Care, Psychosocial Clinical Specialist consults as needed  Outcome: Progressing  Flowsheets (Taken 12/9/2022 0800)  Patient/family able to verbalize anxieties, fears, and concerns, and demonstrate effective coping: Assist patient/family to identify coping skills, available support systems and cultural and spiritual values     Problem: Nutrition Deficit:  Goal: Optimize nutritional status  Outcome: Progressing

## 2022-12-09 NOTE — PROGRESS NOTES
Comprehensive Nutrition Assessment    Type and Reason for Visit:  Reassess    Nutrition Recommendations/Plan:   Monitor for tolerance to General diet  Add Clear oral supplement to B, frozen oral supplement to lunch and high calorie high protein supplement to D , until po > 75%     Malnutrition Assessment:  Malnutrition Status: Moderate malnutrition (per medicall hx) (12/07/22 1412)    Context:  Chronic Illness     Findings of the 6 clinical characteristics of malnutrition:  Energy Intake:  Mild decrease in energy intake (Comment)  Weight Loss:  Mild weight loss (specify amount and time period)     Body Fat Loss:   (per visual assessment) Orbital, Buccal region   Muscle Mass Loss:  Mild muscle mass loss Clavicles (pectoralis & deltoids), Temples (temporalis) (per visual assessment)  Fluid Accumulation:  Unable to assess     Strength:  Not Performed    Nutrition Assessment:    Moderate malnutrition continues, extubated earlier today, will add oral nutrition supplements until intake > 75% established    Nutrition Related Findings:    intubated since 12/7, extubated today, received EN @ goal < 24 hrs, , last BM 12/7, labs noted, meds reviewed Wound Type: Skin Tears       Current Nutrition Intake & Therapies:    Average Meal Intake: Unable to assess  Average Supplements Intake: None Ordered  ADULT DIET; Regular    Anthropometric Measures:  Height: 5' 9\" (175.3 cm)  Ideal Body Weight (IBW): 160 lbs (73 kg)    Admission Body Weight: 138 lb (62.6 kg)  Current Body Weight: 138 lb (62.6 kg),   86% IBW.  Weight Source: Bed Scale  Current BMI (kg/m2): 20.4  Usual Body Weight: 147 lb (66.7 kg) (( 9/2021) limited EMR wts available)  % Weight Change (Calculated): -6.1                    BMI Categories: Underweight (BMI less than 22) age over 72    Estimated Daily Nutrient Needs:  Energy Requirements Based On: Kcal/kg  Weight Used for Energy Requirements: Current  Energy (kcal/day): 1126-9883 kcals @ 28-20 kcal/kg  Weight Used for Protein Requirements: Current  Protein (g/day): 75 g protein @ 1.2 g/kg  Method Used for Fluid Requirements: ml/Kg  Fluid (ml/day): ~1890 ml @ 30 ml/kg    Nutrition Diagnosis:   Moderate malnutrition, In context of chronic illness related to increase demand for energy/nutrients, impaired respiratory function as evidenced by Criteria as identified in malnutrition assessment    Nutrition Interventions:   Food and/or Nutrient Delivery: Continue Current Diet, Start Oral Nutrition Supplement  Nutrition Education/Counseling: No recommendation at this time  Coordination of Nutrition Care: Continue to monitor while inpatient       Goals:  Previous Goal Met: Goal(s) Achieved  Goals: PO intake 75% or greater, by next RD assessment (12/9 new goals)       Nutrition Monitoring and Evaluation:   Behavioral-Environmental Outcomes: None Identified  Food/Nutrient Intake Outcomes: Diet Advancement/Tolerance, Food and Nutrient Intake, Supplement Intake  Physical Signs/Symptoms Outcomes: Meal Time Behavior, Weight    Discharge Planning:     Too soon to determine     Otdeandre Ferrari, RD, LD

## 2022-12-09 NOTE — PROGRESS NOTES
Patient remains on vent and fentanyl drip. Awake and alert following commands. Vitals stable, with fever of 38.1 given PRN Tylenol. Tolerating tube feed. ~2814-2810 CPAP. Pt. Calm and tolerated well. ABG done and reported to Moy NUÑEZ.    1038- pt. Extubated to bipap. 1200- pt. Alert and oriented x4. Family later stating that pt. Is hallucinating, talking about a \"floating ball\" in the room. 1600- pt. Still answers orientation questions appropriately. Denies any hallucinations. Irritable at times. Bipap taken off for pt. To eat dinner. Pt. Tolerating food and drinks. After dinner pt. Placed back on bipap and educated on need to wear bipap overnight. Pt. Calm and compliant at this time. Handoff report given to Ana Ambrose.

## 2022-12-09 NOTE — PROGRESS NOTES
Hospitalist Progress Note      Date of Admission: 12/7/2022  Chief Complaint:    No chief complaint on file. Subjective:  No new complaints. No nausea, vomiting, chest pain, or headache   Happy to be off vent. Would prefer to also be off of bipap. Medications:    Infusion Medications    dextrose      sodium chloride      sodium chloride       Scheduled Medications    cefTRIAXone (ROCEPHIN) IV  1,000 mg IntraVENous Q24H    enoxaparin  40 mg SubCUTAneous Daily    famotidine (PEPCID) injection  20 mg IntraVENous BID    insulin lispro  0-4 Units SubCUTAneous Q4H    methylPREDNISolone  40 mg IntraVENous Daily    levothyroxine  112 mcg Oral Daily    rosuvastatin  10 mg Oral Daily    midodrine  10 mg Oral TID WC    docusate  100 mg Oral BID    nicotine  1 patch TransDERmal Daily    lidocaine  5 mL IntraDERmal Once    sodium chloride flush  5-40 mL IntraVENous 2 times per day     PRN Meds: ondansetron **OR** ondansetron, polyethylene glycol, acetaminophen **OR** acetaminophen, glucose, dextrose bolus **OR** dextrose bolus, glucagon (rDNA), dextrose, sodium chloride flush, sodium chloride    Intake/Output Summary (Last 24 hours) at 12/9/2022 1545  Last data filed at 12/9/2022 1521  Gross per 24 hour   Intake 1549.44 ml   Output 1675 ml   Net -125.56 ml       Exam:  /69   Pulse 75   Temp 99.3 °F (37.4 °C) (Rectal)   Resp 27   Ht 5' 9\" (1.753 m)   Wt 138 lb 0.1 oz (62.6 kg)   SpO2 92%   BMI 20.38 kg/m²   Head: Normocephalic, atraumatic  Sclera clear  Neck JVD flat  Lungs: Few scattered crackles, normal effort of breathing    Labs:   Recent Labs     12/07/22  0338 12/07/22  1245 12/08/22  0445 12/08/22  0942 12/09/22  0509 12/09/22  0959   WBC 5.7  --  7.6  --  7.9  --    HGB 9.9*   < > 11.0* 13.4* 10.9* 13.4*   HCT 32.0*  --  35.2*  --  35.2*  --      --  213  --  215  --     < > = values in this interval not displayed.        Recent Labs     12/07/22  0338 12/07/22  1245 12/08/22  0445 12/08/22  0942 12/09/22  0509 12/09/22  0959     --  138  --  137  --    K 4.2  4.2  --  3.8  --  4.3  --      --  100  --  100  --    CO2 32*  --  32*  --  31  --    BUN 25*  --  25*  --  20  --    CREATININE 0.44*   < > 0.49* 0.7* 0.43* 0.6*   CALCIUM 8.5  --  9.0  --  9.0  --    AST 7  --  9  --   --   --    ALT <5  --  6  --   --   --    BILIDIR  --   --  <0.2  --   --   --    BILITOT <0.2  --  <0.2  --   --   --    ALKPHOS 48  --  48  --   --   --     < > = values in this interval not displayed. Recent Labs     12/08/22  0445   INR 1.1       Recent Labs     12/07/22  1031 12/07/22  1626 12/07/22  2148   TROPONINI <0.010 <0.010 <0.010       Radiology:  XR CHEST PORTABLE   Final Result   Increased lung markings which are nonspecific but likely related to fibrosis. Post endotracheal and nasogastric intubation. XR CHEST PORTABLE    (Results Pending)     Assessment/Plan:    Acute on chronic hypoxic and hypercapnic respiratory failure secondary to COPD exacerbation from viral infection as evidenced by RSV and adenovirus positive serology. Extubated 12/9    COPD exacerbation: Steroids, bronchodilators. Following with pulmonology. Community-acquired pneumonia: Covering for bacterial pneumonia with IV antibiotics in coordination with pulmonology. Hypothyroidism: Continue medication.   Hyperlipidemia: Continue rosuvastatin    35 minutes total care time, >1/2 in unit/floor time and care coordination       Sharifa Amaro MD

## 2022-12-09 NOTE — PROGRESS NOTES
Pt had uneventful night. Pt on Fentanyl drip. Tolerating tube feedings. Fio2 had to be increased to 55% duringn the night- sats were going down in the 80s. Needs occasional suctioning for large amounts thick yellow mucous.

## 2022-12-09 NOTE — PLAN OF CARE
Problem: Nutrition Deficit:  Goal: Optimize nutritional status  12/9/2022 1520 by Mirela Macias RD, TERRA  Outcome: Not Progressing  Flowsheets (Taken 12/9/2022 1507)  Nutrient intake appropriate for improving, restoring, or maintaining nutritional needs:   Assess nutritional status and recommend course of action   Monitor oral intake, labs, and treatment plans   Recommend appropriate diets, oral nutritional supplements, and vitamin/mineral supplements  12/9/2022 1509 by Candelario Jung RN  Outcome: Progressing  Flowsheets (Taken 12/9/2022 1507 by Mirela Macias RD, TERRA)  Nutrient intake appropriate for improving, restoring, or maintaining nutritional needs:   Assess nutritional status and recommend course of action   Monitor oral intake, labs, and treatment plans   Recommend appropriate diets, oral nutritional supplements, and vitamin/mineral supplements

## 2022-12-09 NOTE — CARE COORDINATION
CALL TO DAUGHTER IONA TO DISCUSS POTENTIAL DC OPTIONS. SHE STATES HE WILL MORE THAN LIKELY ONLY WANT TO GO HOME. SHE STATES THERE ARE SOME PLACES IN [place] FOR SNF IF HE NEEDS TO BUT WOULD LIKE THE PLAN TO BE HOME WITH HHC IF POSSIBLE. SHE STATES HER DAUGHTER WILL BE HOME 24/7 FOR HIM AS WELL.

## 2022-12-09 NOTE — PROGRESS NOTES
Pulmonary & Critical Care Medicine ICU Progress Note  Chief complaint : Acute respiratory failure     Subjunctive/24 hour events :   Patient seen and examined during multidisciplinary rounds with RN, charge nurse, RT, pharmacy, dietitian, and social service. Patient remains on the vent, FiO2 is 55%, peep of 5, O2 sats are 96%. Desaturation through out the night and increase in the FiO2 to 55%. He is sedated with fentanyl and propofol. Suctioning large amount of yellow thick mucous. T-max 100.4 overnight and urine output 1275 for 24 hours. Tolerating tube feeding and last BM 12/7/2022. Social History     Tobacco Use    Smoking status: Every Day     Packs/day: 1.00     Types: Cigarettes    Smokeless tobacco: Never   Substance Use Topics    Alcohol use: Never     No family history on file. Recent Labs     12/07/22  1245 12/08/22  0942   PHART 7.281* 7.294*   [id number] 67* 71*   PO2ART 131* 69*         MV Settings:  Vent Mode: CPAP/PS Resp Rate (Set): 16 bmp/Vt (Set, mL): 450 mL/ /FiO2 : 55 %           IV:   propofol Stopped (12/07/22 1530)    dextrose      fentaNYL (SUBLIMAZE) infusion 75 mcg/hr (12/09/22 9623)    sodium chloride      sodium chloride         Vitals:  /63   Pulse 79   Temp 100.4 °F (38 °C) (Rectal)   Resp 20   Ht 5' 9\" (1.753 m)   Wt 138 lb 0.1 oz (62.6 kg)   SpO2 93%   BMI 20.38 kg/m²    Tmax:       Intake/Output Summary (Last 24 hours) at 12/9/2022 0857  Last data filed at 12/9/2022 9734  Gross per 24 hour   Intake 1086.93 ml   Output 1275 ml   Net -188.07 ml         EXAM:    General: alert, cooperative  Head: normocephalic, atraumatic  Eyes:No gross abnormalities. ENT:  MMM no lesions  Neck:  supple and no masses  Chest : Fair air movement, occ rales no wheezes   Heart[de-identified] Heart sounds are normal.  Regular rate and rhythm without murmur, gallop or rub.   ABD:  bowel sounds normal, soft, non-tender  Musculoskeletal : no cyanosis, no clubbing, and no edema  Neuro:  Grossly normal  Skin: No rashes or nodules noted. Lymph node:  no cervical nodes  Urology: No Sherman   Psychiatric: appropriate and anxious    Medications:  Scheduled Meds:   enoxaparin  40 mg SubCUTAneous Daily    chlorhexidine  15 mL Mouth/Throat BID    famotidine (PEPCID) injection  20 mg IntraVENous BID    insulin lispro  0-4 Units SubCUTAneous Q4H    methylPREDNISolone  40 mg IntraVENous Daily    levothyroxine  112 mcg Oral Daily    rosuvastatin  10 mg Oral Daily    midodrine  10 mg Oral TID WC    docusate  100 mg Oral BID    nicotine  1 patch TransDERmal Daily    lidocaine  5 mL IntraDERmal Once    sodium chloride flush  5-40 mL IntraVENous 2 times per day       PRN Meds:  ondansetron **OR** ondansetron, polyethylene glycol, acetaminophen **OR** acetaminophen, glucose, dextrose bolus **OR** dextrose bolus, glucagon (rDNA), dextrose, sodium chloride flush, sodium chloride    Results: reviewed by me   CBC:   Recent Labs     12/07/22 0338 12/07/22  1245 12/08/22  0445 12/08/22  0942 12/09/22  0509   WBC 5.7  --  7.6  --  7.9   HGB 9.9*   < > 11.0* 13.4* 10.9*   HCT 32.0*  --  35.2*  --  35.2*   MCV 83.9  --  82.8  --  82.7     --  213  --  215    < > = values in this interval not displayed. BMP:   Recent Labs     12/07/22 0338 12/07/22  1245 12/08/22  0445 12/08/22  0942 12/09/22  0509     --  138  --  137   K 4.2  4.2  --  3.8  --  4.3     --  100  --  100   CO2 32*  --  32*  --  31   BUN 25*  --  25*  --  20   CREATININE 0.44*   < > 0.49* 0.7* 0.43*    < > = values in this interval not displayed. LIVER PROFILE:   Recent Labs     12/07/22 0338 12/08/22 0445   AST 7 9   ALT <5 6   BILIDIR  --  <0.2   BILITOT <0.2 <0.2   ALKPHOS 48 48       PT/INR:   Recent Labs     12/08/22  0445   PROTIME 14.2   INR 1.1       APTT: No results for input(s): APTT in the last 72 hours.   UA:  Recent Labs     12/07/22  0514   COLORU Yellow   PHUR 5.5   WBCUA 3-5   RBCUA 20-50*   BACTERIA Negative CLARITYU TURBID*   SPECGRAV 1.022   LEUKOCYTESUR Negative   UROBILINOGEN 0.2   BILIRUBINUR Negative   BLOODU MODERATE*   GLUCOSEU Negative         Cultures:    XR CHEST PORTABLE    Result Date: 12/7/2022  EXAMINATION: ONE XRAY VIEW OF THE CHEST 12/7/2022 2:51 am COMPARISON: None. HISTORY: ORDERING SYSTEM PROVIDED HISTORY: ETT placement following transport TECHNOLOGIST PROVIDED HISTORY: Reason for exam:->ETT placement following transport What reading provider will be dictating this exam?->CRC FINDINGS: Endotracheal tube tip is at the thoracic inlet. NG tube is well within the gastric lumen. There increased markings in both lungs, left greater than right which are nonspecific but likely to relate to fibrosis. Normal heart and pulmonary vascularity. Increased lung markings which are nonspecific but likely related to fibrosis. Post endotracheal and nasogastric intubation. XR CHEST PORTABLE    Result Date: 12/6/2022  EXAM: XR CHEST PORTABLE HISTORY: Reason for exam:->tube placement COMPARISON: December 6, 2022 at 1056 hours TECHNIQUE: An AP portable supine view of the chest was obtained for which the lateral aspect of the right hemithorax was excluded FINDINGS: There is patchy airspace disease within the infrahilar regions, with central bronchial wall thickening. The heart size is within normal limits, with atherosclerotic vascular calcification within the aorta. There is an endotracheal tube in place with the tip 4.5 cm above the adilene. The osseous structures are stable. 1. Interval placement of an endotracheal tube. 2. Bilateral infrahilar airspace disease may be associated with pulmonary edema and/or pneumonia. Recommend clinical correlation.      XR CHEST PORTABLE    Result Date: 12/6/2022  EXAMINATION: XR CHEST PORTABLE HISTORY: Reason for exam:->SOB COMPARISON: 1/9/2022 TECHNIQUE: AP portable FINDINGS: LUNGS: Bibasilar infiltrates, slightly increased from the prior exam. Increased interstitial lung markings likely chronic changes. Mild peribronchial thickening. VASCULATURE: No increased pulmonary vasculature. PLEURA: No pneumothorax, effusion, or pleural thickening. CARDIAC: No cardiomegaly or cardiac silhouette abnormality. MEDIASTINUM: No visible mass or adenopathy. BONES: No fracture or visible bone lesion. OTHER: Negative. Mild increase in bibasilar infiltrates     Most recent    Chest CT      WITH CONTRAST:No results found for this or any previous visit. WITHOUT CONTRAST: Results for orders placed during the hospital encounter of 01/09/22    CT CHEST WO CONTRAST    Narrative  EXAMINATION: CT CHEST WO CONTRAST    HISTORY: Reason for exam:->evaluate for pneumonia is    COMPARISON: XR chest 1/9/2022      TECHNIQUE: Axial, Coronal, and Sagittal images were created without the  administration of IV contrast material. Dose reduction techniques were achieved  by using automated exposure control and/or adjustment of mA and/or kV according  to patient size and/or use of iterative reconstruction technique. FINDINGS:  LUNGS: Marked emphysematous changes. A few areas of mild patchy and strandy  opacities within the anterior lateral left lung base. Tiny faint patchy and  nodular opacities scattered within the lungs, most notable within the posterior  right lung base. 6 mm nodule within left upper lobe adjacent the hilum. PLEURA: No mass, effusion, or pneumothorax. VASCULATURE: No abnormality. ARIADNA: No mass or adenopathy. MEDIASTINUM: Mild lymphadenopathy. Calcified subcarinal lymph nodes. CARDIAC: Trace amount of pericardial fluid. No significant cardiac enlargement. AORTA: No aneurysm or dissection. CHEST WALL: No mass or axillary adenopathy. BONES: No bone lesion or fracture. LIMITED ABDOMEN: Innumerable small and large hypodense lesions throughout the  liver; larger lesions tend to measure fluid density. Multiple dense  calcifications within the liver.  Numerous tiny stones layering within the  gallbladder. Bilateral adrenal gland hypertrophy. Old incompletely healed  posterior lateral right eighth rib fracture. Old healed left rib fractures. Limited images of the upper abdomen. OTHER: Negative. Impression  1. Marked emphysematous changes. 2. Mild lingular infiltrates versus atelectasis. 3. Scattered tiny nodular and patchy opacities; nonspecific. Follow-up CT  imaging in 3 months is recommended to document stability. 4. Mild mediastinal lymphadenopathy; some of this may be reactive, but there is  also evidence of chronic granulomatous disease. 5. Innumerable hypodense lesions throughout the liver suspected represent  cysts, or possibly cysts and hemangiomas. Consider nonemergent multiphase CT  imaging of the liver for confirmation and to exclude a mass. 6. Cholelithiasis. CXR      2-view: No results found for this or any previous visit. Portable: Results for orders placed during the hospital encounter of 12/07/22    XR CHEST PORTABLE    Narrative  EXAMINATION:  ONE XRAY VIEW OF THE CHEST    12/7/2022 2:51 am    COMPARISON:  None. HISTORY:  ORDERING SYSTEM PROVIDED HISTORY: ETT placement following transport  TECHNOLOGIST PROVIDED HISTORY:  Reason for exam:->ETT placement following transport  What reading provider will be dictating this exam?->CRC    FINDINGS:  Endotracheal tube tip is at the thoracic inlet. NG tube is well within the  gastric lumen. There increased markings in both lungs, left greater than  right which are nonspecific but likely to relate to fibrosis. Normal heart  and pulmonary vascularity. Impression  Increased lung markings which are nonspecific but likely related to fibrosis. Post endotracheal and nasogastric intubation. Echo No results found for this or any previous visit. Assessment:   This is a critically ill patient at risk of deterioration / death , needing close ICU monitoring and intervention due to below noted problems   Acute on chronic hypoxic and hypercapnic respiratory failure   Adenovirus and RSV   COPD with exacerbation   Acute encephalopathy, improved   Diabetes     Recommendations   Vent support lung protective strategy  head of the bed 30°  Daily sedation holidays and breathing trials  Sedation with combination propofol and fentanyl target R ASS of 0 to -1  Watch for ICU delirium: TV on, natural light, avoid benzos, pain control, early mobility, and family engagement  PUD prophylaxis  DVT prophylaxis   Continue solumedrol   Continue midodrine   Chest xray tomorrow   Extubate to Bipap   Continue tube feeding   Maintain blood sugar 140-180  Monitor electrolytes and replace as needed                 Electronically signed by Rhiannon Meneses, APRN - CNP,  FCCP ,on 12/9/2022 at 8:57 AM

## 2022-12-09 NOTE — CARE COORDINATION
Quality round completed this AM with care management team. Pt remain intubated. No family at bedside. Possibly need SNF/Rehab before going home. CM will call pt's daughter to discuss. FRANCISCO will follow.      Electronically signed by FRANCISCO Perrin on 12/9/2022 at 11:28 AM

## 2022-12-10 ENCOUNTER — APPOINTMENT (OUTPATIENT)
Dept: GENERAL RADIOLOGY | Age: 77
DRG: 871 | End: 2022-12-10
Attending: INTERNAL MEDICINE
Payer: MEDICARE

## 2022-12-10 LAB
ANION GAP SERPL CALCULATED.3IONS-SCNC: 5 MEQ/L (ref 9–15)
BASE EXCESS ARTERIAL: 12 (ref -3–3)
BASOPHILS ABSOLUTE: 0 K/UL (ref 0–0.2)
BASOPHILS RELATIVE PERCENT: 0.3 %
BUN BLDV-MCNC: 16 MG/DL (ref 8–23)
CALCIUM IONIZED: 1.3 MMOL/L (ref 1.12–1.32)
CALCIUM SERPL-MCNC: 8.6 MG/DL (ref 8.5–9.9)
CHLORIDE BLD-SCNC: 99 MEQ/L (ref 95–107)
CO2: 34 MEQ/L (ref 20–31)
CREAT SERPL-MCNC: 0.36 MG/DL (ref 0.7–1.2)
EOSINOPHILS ABSOLUTE: 0 K/UL (ref 0–0.7)
EOSINOPHILS RELATIVE PERCENT: 0.1 %
GFR SERPL CREATININE-BSD FRML MDRD: >60 ML/MIN/{1.73_M2}
GFR SERPL CREATININE-BSD FRML MDRD: >60 ML/MIN/{1.73_M2}
GLUCOSE BLD-MCNC: 100 MG/DL (ref 70–99)
GLUCOSE BLD-MCNC: 115 MG/DL (ref 70–99)
GLUCOSE BLD-MCNC: 124 MG/DL (ref 70–99)
GLUCOSE BLD-MCNC: 143 MG/DL (ref 70–99)
GLUCOSE BLD-MCNC: 150 MG/DL (ref 70–99)
GLUCOSE BLD-MCNC: 158 MG/DL (ref 70–99)
GLUCOSE BLD-MCNC: 161 MG/DL (ref 70–99)
GLUCOSE BLD-MCNC: 72 MG/DL (ref 70–99)
HCO3 ARTERIAL: 37.9 MMOL/L (ref 21–29)
HCT VFR BLD CALC: 36.8 % (ref 42–52)
HEMOGLOBIN: 11.4 G/DL (ref 14–18)
HEMOGLOBIN: 13.2 GM/DL (ref 13.5–17.5)
LACTATE: 0.49 MMOL/L (ref 0.4–2)
LYMPHOCYTES ABSOLUTE: 0.6 K/UL (ref 1–4.8)
LYMPHOCYTES RELATIVE PERCENT: 12.9 %
MCH RBC QN AUTO: 25.7 PG (ref 27–31.3)
MCHC RBC AUTO-ENTMCNC: 30.9 % (ref 33–37)
MCV RBC AUTO: 83.2 FL (ref 79–92.2)
MONOCYTES ABSOLUTE: 0.7 K/UL (ref 0.2–0.8)
MONOCYTES RELATIVE PERCENT: 16.5 %
NEUTROPHILS ABSOLUTE: 3.1 K/UL (ref 1.4–6.5)
NEUTROPHILS RELATIVE PERCENT: 70.2 %
O2 SAT, ARTERIAL: 96 % (ref 93–100)
PCO2 ARTERIAL: 74 MM HG (ref 35–45)
PDW BLD-RTO: 16.1 % (ref 11.5–14.5)
PERFORMED ON: ABNORMAL
PERFORMED ON: NORMAL
PH ARTERIAL: 7.32 (ref 7.35–7.45)
PLATELET # BLD: 196 K/UL (ref 130–400)
PO2 ARTERIAL: 90 MM HG (ref 75–108)
POC CHLORIDE: 97 MEQ/L (ref 99–110)
POC CREATININE: 0.5 MG/DL (ref 0.8–1.3)
POC FIO2: 50
POC HEMATOCRIT: 39 % (ref 41–53)
POC POTASSIUM: 4.1 MEQ/L (ref 3.5–5.1)
POC SAMPLE TYPE: ABNORMAL
POC SODIUM: 141 MEQ/L (ref 136–145)
POTASSIUM REFLEX MAGNESIUM: 4.2 MEQ/L (ref 3.4–4.9)
RBC # BLD: 4.42 M/UL (ref 4.7–6.1)
SODIUM BLD-SCNC: 138 MEQ/L (ref 135–144)
TCO2 ARTERIAL: 40 MMOL/L (ref 21–32)
WBC # BLD: 4.4 K/UL (ref 4.8–10.8)

## 2022-12-10 PROCEDURE — 2580000003 HC RX 258: Performed by: INTERNAL MEDICINE

## 2022-12-10 PROCEDURE — 82803 BLOOD GASES ANY COMBINATION: CPT

## 2022-12-10 PROCEDURE — 6370000000 HC RX 637 (ALT 250 FOR IP): Performed by: INTERNAL MEDICINE

## 2022-12-10 PROCEDURE — 82330 ASSAY OF CALCIUM: CPT

## 2022-12-10 PROCEDURE — 97162 PT EVAL MOD COMPLEX 30 MIN: CPT

## 2022-12-10 PROCEDURE — 71045 X-RAY EXAM CHEST 1 VIEW: CPT

## 2022-12-10 PROCEDURE — 82565 ASSAY OF CREATININE: CPT

## 2022-12-10 PROCEDURE — 99233 SBSQ HOSP IP/OBS HIGH 50: CPT | Performed by: INTERNAL MEDICINE

## 2022-12-10 PROCEDURE — 85025 COMPLETE CBC W/AUTO DIFF WBC: CPT

## 2022-12-10 PROCEDURE — 82948 REAGENT STRIP/BLOOD GLUCOSE: CPT

## 2022-12-10 PROCEDURE — 83605 ASSAY OF LACTIC ACID: CPT

## 2022-12-10 PROCEDURE — 2700000000 HC OXYGEN THERAPY PER DAY

## 2022-12-10 PROCEDURE — 85014 HEMATOCRIT: CPT

## 2022-12-10 PROCEDURE — 94761 N-INVAS EAR/PLS OXIMETRY MLT: CPT

## 2022-12-10 PROCEDURE — 84132 ASSAY OF SERUM POTASSIUM: CPT

## 2022-12-10 PROCEDURE — 82435 ASSAY OF BLOOD CHLORIDE: CPT

## 2022-12-10 PROCEDURE — 36600 WITHDRAWAL OF ARTERIAL BLOOD: CPT

## 2022-12-10 PROCEDURE — 36415 COLL VENOUS BLD VENIPUNCTURE: CPT

## 2022-12-10 PROCEDURE — 6360000002 HC RX W HCPCS: Performed by: INTERNAL MEDICINE

## 2022-12-10 PROCEDURE — 84295 ASSAY OF SERUM SODIUM: CPT

## 2022-12-10 PROCEDURE — 1210000000 HC MED SURG R&B

## 2022-12-10 PROCEDURE — 80048 BASIC METABOLIC PNL TOTAL CA: CPT

## 2022-12-10 PROCEDURE — 97166 OT EVAL MOD COMPLEX 45 MIN: CPT

## 2022-12-10 RX ORDER — PREDNISONE 20 MG/1
40 TABLET ORAL DAILY
Status: DISCONTINUED | OUTPATIENT
Start: 2022-12-11 | End: 2022-12-12 | Stop reason: HOSPADM

## 2022-12-10 RX ORDER — FAMOTIDINE 20 MG/1
20 TABLET, FILM COATED ORAL 2 TIMES DAILY
Status: DISCONTINUED | OUTPATIENT
Start: 2022-12-10 | End: 2022-12-12 | Stop reason: HOSPADM

## 2022-12-10 RX ORDER — INSULIN LISPRO 100 [IU]/ML
0-4 INJECTION, SOLUTION INTRAVENOUS; SUBCUTANEOUS
Status: DISCONTINUED | OUTPATIENT
Start: 2022-12-10 | End: 2022-12-12 | Stop reason: HOSPADM

## 2022-12-10 RX ORDER — INSULIN LISPRO 100 [IU]/ML
0-4 INJECTION, SOLUTION INTRAVENOUS; SUBCUTANEOUS NIGHTLY
Status: DISCONTINUED | OUTPATIENT
Start: 2022-12-10 | End: 2022-12-12 | Stop reason: HOSPADM

## 2022-12-10 RX ADMIN — Medication 10 ML: at 21:27

## 2022-12-10 RX ADMIN — DOCUSATE SODIUM 100 MG: 50 LIQUID ORAL at 09:42

## 2022-12-10 RX ADMIN — MIDODRINE HYDROCHLORIDE 10 MG: 5 TABLET ORAL at 17:08

## 2022-12-10 RX ADMIN — FAMOTIDINE 20 MG: 20 TABLET, FILM COATED ORAL at 09:42

## 2022-12-10 RX ADMIN — LEVOTHYROXINE SODIUM 112 MCG: 112 TABLET ORAL at 09:42

## 2022-12-10 RX ADMIN — ENOXAPARIN SODIUM 40 MG: 100 INJECTION SUBCUTANEOUS at 09:43

## 2022-12-10 RX ADMIN — MIDODRINE HYDROCHLORIDE 10 MG: 5 TABLET ORAL at 12:23

## 2022-12-10 RX ADMIN — ROSUVASTATIN CALCIUM 10 MG: 20 TABLET, FILM COATED ORAL at 09:42

## 2022-12-10 RX ADMIN — DOCUSATE SODIUM 100 MG: 50 LIQUID ORAL at 21:19

## 2022-12-10 RX ADMIN — FAMOTIDINE 20 MG: 20 TABLET, FILM COATED ORAL at 21:19

## 2022-12-10 RX ADMIN — MIDODRINE HYDROCHLORIDE 10 MG: 5 TABLET ORAL at 09:42

## 2022-12-10 RX ADMIN — CEFTRIAXONE SODIUM 1000 MG: 1 INJECTION, POWDER, FOR SOLUTION INTRAMUSCULAR; INTRAVENOUS at 12:28

## 2022-12-10 ASSESSMENT — PAIN SCALES - GENERAL
PAINLEVEL_OUTOF10: 0

## 2022-12-10 NOTE — PROGRESS NOTES
Physical Therapy Med Surg Initial Assessment  [place]ility[place] [facility] [place]  Room: [place] [address]       NAME: Wauneta Lundborg  : 1945 (23 y.o.)  MRN: [id number]  CODE STATUS: Full Code    Date of Service: 12/10/2022    Patient Diagnosis(es): Acute respiratory failure ([facility] [J96.00]   No chief complaint on file. Patient Active Problem List    Diagnosis Date Noted    Acute respiratory failure ([facility] 2022    Community acquired bacterial pneumonia 2022    Moderate malnutrition ([facility] 2022    COPD exacerbation ([facility] 2022        Past Medical History:   Diagnosis Date    COPD (chronic obstructive pulmonary disease) ([facility]     Diabetes mellitus ([facility]      Past Surgical History:   Procedure Laterality Date    BRAIN SURGERY         Chart Reviewed: Yes  Patient assessed for rehabilitation services?: Yes    Restrictions:   Droplet; contact     SUBJECTIVE: Subjective: Patient cleared by RN for PT evaluation. Patient agreeable to PT evaluation. Pain   7/10 \"arthritis all over\"    Post Treatment Pain Screenin/10 \"arthritis all over\"    Prior Level of Function:  Social/Functional History  Lives With: Other (comment) (grandson)  Type of Home: House  Home Layout: Able to Live on Main level with bedroom/bathroom, Two level  Home Access: Level entry  Bathroom Shower/Tub: Tub/Shower unit  [employer]: Grab bars in shower, Shower chair, Hand-held shower  ADL Assistance: Independent  Ambulation Assistance: Independent (no AD)  Transfer Assistance: Independent  Active : Yes  Additional Comments: Difficulty to understand pt through CPAP, RN was going to remove prior to medical emergency in another room. OBJECTIVE:   Hearing: Within functional limits    Cognition:  Overall Orientation Status: Within Functional Limits  Follows Commands: Within Functional Limits    Observation/Palpation  Posture: Good (Simultaneous filing.  User may not have seen previous data.)  Observation: IV, C-Pap,

## 2022-12-10 NOTE — PLAN OF CARE
Plan of care progressing at this time. Plan for PT/OT to evaluate patient later today for potential needs on D/C. Remains on NC when awake and was placed on bipap for sleep as he desats into the 80s when asleep. He is adamant he wants to go home and was educated about the importance of insuring he will be safe and healthy enough to go home. Electronically signed by Sharon Caldwell RN on 12/10/2022 at 1:59 PM    Problem: Discharge Planning  Goal: Discharge to home or other facility with appropriate resources  Outcome: Progressing     Problem: Chronic Conditions and Co-morbidities  Goal: Patient's chronic conditions and co-morbidity symptoms are monitored and maintained or improved  Outcome: Progressing     Problem: Pain  Goal: Verbalizes/displays adequate comfort level or baseline comfort level  Outcome: Progressing     Problem: Skin/Tissue Integrity  Goal: Absence of new skin breakdown  Description: 1. Monitor for areas of redness and/or skin breakdown  2. Assess vascular access sites hourly  3. Every 4-6 hours minimum:  Change oxygen saturation probe site  4. Every 4-6 hours:  If on nasal continuous positive airway pressure, respiratory therapy assess nares and determine need for appliance change or resting period.   Outcome: Progressing     Problem: ABCDS Injury Assessment  Goal: Absence of physical injury  Outcome: Progressing     Problem: Safety - Adult  Goal: Free from fall injury  Outcome: Progressing     Problem: Neurosensory - Adult  Goal: Achieves stable or improved neurological status  Outcome: Progressing  Goal: Achieves maximal functionality and self care  Outcome: Progressing     Problem: Respiratory - Adult  Goal: Achieves optimal ventilation and oxygenation  Outcome: Progressing     Problem: Cardiovascular - Adult  Goal: Maintains optimal cardiac output and hemodynamic stability  Outcome: Progressing  Goal: Absence of cardiac dysrhythmias or at baseline  Outcome: Progressing     Problem: Skin/Tissue Integrity - Adult  Goal: Skin integrity remains intact  Outcome: Progressing  Goal: Oral mucous membranes remain intact  Outcome: Progressing     Problem: Musculoskeletal - Adult  Goal: Return mobility to safest level of function  Outcome: Progressing  Goal: Return ADL status to a safe level of function  Outcome: Progressing     Problem: Gastrointestinal - Adult  Goal: Minimal or absence of nausea and vomiting  Outcome: Progressing  Goal: Maintains or returns to baseline bowel function  Outcome: Progressing  Goal: Maintains adequate nutritional intake  Outcome: Progressing     Problem: Genitourinary - Adult  Goal: Absence of urinary retention  Outcome: Progressing     Problem: Infection - Adult  Goal: Absence of infection at discharge  Outcome: Progressing     Problem: Metabolic/Fluid and Electrolytes - Adult  Goal: Electrolytes maintained within normal limits  Outcome: Progressing  Goal: Hemodynamic stability and optimal renal function maintained  Outcome: Progressing  Goal: Glucose maintained within prescribed range  Outcome: Progressing     Problem: Hematologic - Adult  Goal: Maintains hematologic stability  Outcome: Progressing     Problem: Coping  Goal: Pt/Family able to verbalize concerns and demonstrate effective coping strategies  Description: INTERVENTIONS:  1. Assist patient/family to identify coping skills, available support systems and cultural and spiritual values  2. Provide emotional support, including active listening and acknowledgement of concerns of patient and caregivers  3. Reduce environmental stimuli, as able  4. Instruct patient/family in relaxation techniques, as appropriate  5.  Assess for spiritual pain/suffering and initiate Spiritual Care, Psychosocial Clinical Specialist consults as needed  Outcome: Progressing     Problem: Nutrition Deficit:  Goal: Optimize nutritional status  Outcome: Progressing

## 2022-12-10 NOTE — FLOWSHEET NOTE
Shift summary: Patient improving today - was agitated this AM and wanting to go home. I educated him regarding the importance that we insure he is healthy enough and strong enough to return home safely. Dr. Shayy Ma notified regarding patient's agitation and wishes to go home. Patient worked with PT/OT this afternoon. Plan to transfer to floor this evening. Family currently at bedside at [phone].  Electronically signed by Magnus Mclean RN on 12/10/2022 at 4:52 PM

## 2022-12-10 NOTE — PLAN OF CARE
Therapy evaluation completed. Please see daily notes and/or progress notes for details related to planned treatment interventions, goals and functional performance.      Electronically signed by Irlanda Lew PT on 12/10/2022 at 3:20 PM

## 2022-12-10 NOTE — PROGRESS NOTES
Hospitalist Progress Note      Date of Admission: 12/7/2022  Chief Complaint:    No chief complaint on file. Subjective:  No new complaints. No nausea, vomiting, chest pain, or headache      Medications:    Infusion Medications    dextrose      sodium chloride       Scheduled Medications    [START ON 12/11/2022] predniSONE  40 mg Oral Daily    famotidine  20 mg Oral BID    insulin lispro  0-4 Units SubCUTAneous TID     insulin lispro  0-4 Units SubCUTAneous Nightly    cefTRIAXone (ROCEPHIN) IV  1,000 mg IntraVENous Q24H    enoxaparin  40 mg SubCUTAneous Daily    levothyroxine  112 mcg Oral Daily    rosuvastatin  10 mg Oral Daily    midodrine  10 mg Oral TID WC    docusate  100 mg Oral BID    nicotine  1 patch TransDERmal Daily    lidocaine  5 mL IntraDERmal Once    sodium chloride flush  5-40 mL IntraVENous 2 times per day     PRN Meds: ondansetron **OR** ondansetron, polyethylene glycol, acetaminophen **OR** acetaminophen, glucose, dextrose bolus **OR** dextrose bolus, glucagon (rDNA), dextrose, sodium chloride flush, sodium chloride    Intake/Output Summary (Last 24 hours) at 12/10/2022 1549  Last data filed at 12/10/2022 1415  Gross per 24 hour   Intake 247.45 ml   Output 1675 ml   Net -1427.55 ml       Exam:  /70   Pulse 81   Temp 98.6 °F (37 °C) (Oral)   Resp 30   Ht 5' 9\" (1.753 m)   Wt 138 lb 0.1 oz (62.6 kg)   SpO2 98%   BMI 20.38 kg/m²   Head: Normocephalic, atraumatic  Sclera clear  Neck JVD flat  Lungs: Few scattered crackles, normal effort of breathing    Labs:   Recent Labs     12/08/22 0445 12/08/22  0942 12/09/22  0509 12/09/22  0959 12/10/22  0413 12/10/22  0420   WBC 7.6  --  7.9  --  4.4*  --    HGB 11.0*   < > 10.9* 13.4* 11.4* 13.2*   HCT 35.2*  --  35.2*  --  36.8*  --      --  215  --  196  --     < > = values in this interval not displayed.        Recent Labs     12/08/22 0445 12/08/22  0942 12/09/22  0509 12/09/22  0959 12/10/22  0413 12/10/22  0420     -- Please call 911 or return to the ED immediately for headache, neck pain, nausea, vomiting, chest pain, shortness of breath, abdominal pain or fevers and chills.  Please follow-up with your primary care doctor next 1 to 2 days, and please call the neurologist that we have referred you to to arrange close follow-up for further evaluation and testing of your dizziness and disequilibrium.   137  --  138  --    K 3.8  --  4.3  --  4.2  --      --  100  --  99  --    CO2 32*  --  31  --  34*  --    BUN 25*  --  20  --  16  --    CREATININE 0.49*   < > 0.43* 0.6* 0.36* 0.5*   CALCIUM 9.0  --  9.0  --  8.6  --    AST 9  --   --   --   --   --    ALT 6  --   --   --   --   --    BILIDIR <0.2  --   --   --   --   --    BILITOT <0.2  --   --   --   --   --    ALKPHOS 48  --   --   --   --   --     < > = values in this interval not displayed. Recent Labs     12/08/22  0445   INR 1.1       Recent Labs     12/07/22  1626 12/07/22  2148   TROPONINI <0.010 <0.010       Radiology:  XR CHEST PORTABLE   Final Result   Patchy bilateral airspace disease. In comparison with the patient's prior   study of 12/07/2022 the right lung airspace disease has slightly worsened. There is no pleural effusion or pneumothorax. XR CHEST PORTABLE   Final Result   Increased lung markings which are nonspecific but likely related to fibrosis. Post endotracheal and nasogastric intubation. Assessment/Plan:    Acute on chronic hypoxic and hypercapnic respiratory failure secondary to COPD exacerbation from viral infection as evidenced by RSV and adenovirus positive serology. Extubated 12/9  Based on clinical improvement may be able to be discharged home with home care. Possible transfer to floor later today    COPD exacerbation: Steroids, bronchodilators. Following with pulmonology. Community-acquired pneumonia: Covering for bacterial pneumonia with IV antibiotics in coordination with pulmonology. Hypothyroidism: Continue medication.   Hyperlipidemia: Continue rosuvastatin    35 minutes total care time, >1/2 in unit/floor time and care coordination       Isac Mcintyre MD

## 2022-12-10 NOTE — PROGRESS NOTES
Pulmonary & Critical Care Medicine ICU Progress Note    Subjective:     No overnight events reported. He did tolerate use of BiPAP. He is currently on nasal cannula without any specific complaints.   He states he is only waiting on breakfast.    EXAM:  General: No acute distress, resting comfortably in bed  HEENT: Normocephalic, atraumatic, pupils equal round and reactive to light  Lungs : Expiratory wheeze noted, no respiratory distress, no rhonchi or rales  Heart: Regular rate and rhythm  ABD: Soft, positive bowel sounds, nontender to palpation  Extremities : Warm, dry, no edema noted  Neuro: Awake, alert, oriented x4, no focal motor or sensory deficits appreciated  Skin: No rashes     Recent Labs     12/09/22  0959 12/10/22  0420   PHART 7.315* 7.317*   CXH6AZL 68* 74*   PO2ART 96* 90*         IV:   dextrose      sodium chloride      sodium chloride         Vitals:  /73   Pulse 70   Temp 98.8 °F (37.1 °C) (Rectal)   Resp 18   Ht 5' 9\" (1.753 m)   Wt 138 lb 0.1 oz (62.6 kg)   SpO2 98%   BMI 20.38 kg/m²          Intake/Output Summary (Last 24 hours) at 12/10/2022 0728  Last data filed at 12/10/2022 0503  Gross per 24 hour   Intake 662.51 ml   Output 1475 ml   Net -812.49 ml       Medications:  Scheduled Meds:   cefTRIAXone (ROCEPHIN) IV  1,000 mg IntraVENous Q24H    enoxaparin  40 mg SubCUTAneous Daily    famotidine (PEPCID) injection  20 mg IntraVENous BID    insulin lispro  0-4 Units SubCUTAneous Q4H    methylPREDNISolone  40 mg IntraVENous Daily    levothyroxine  112 mcg Oral Daily    rosuvastatin  10 mg Oral Daily    midodrine  10 mg Oral TID WC    docusate  100 mg Oral BID    nicotine  1 patch TransDERmal Daily    lidocaine  5 mL IntraDERmal Once    sodium chloride flush  5-40 mL IntraVENous 2 times per day       Labs:   CBC:   Recent Labs     12/08/22  0445 12/08/22  0942 12/09/22  0509 12/09/22  0959 12/10/22  0413 12/10/22  0420   WBC 7.6  --  7.9  --  4.4*  --    HGB 11.0*   < > 10.9* 13.4* 11.4* 13.2*   HCT 35.2*  --  35.2*  --  36.8*  --    MCV 82.8  --  82.7  --  83.2  --      --  215  --  196  --     < > = values in this interval not displayed. BMP:   Recent Labs     12/08/22  0445 12/08/22  0942 12/09/22  0509 12/09/22  0959 12/10/22  0413 12/10/22  0420     --  137  --  138  --    K 3.8  --  4.3  --  4.2  --      --  100  --  99  --    CO2 32*  --  31  --  34*  --    BUN 25*  --  20  --  16  --    CREATININE 0.49*   < > 0.43* 0.6* 0.36* 0.5*    < > = values in this interval not displayed. LIVER PROFILE:   Recent Labs     12/08/22 0445   AST 9   ALT 6   BILIDIR <0.2   BILITOT <0.2   ALKPHOS 48     PT/INR:   Recent Labs     12/08/22 0445   PROTIME 14.2   INR 1.1     APTT: No results for input(s): APTT in the last 72 hours. UA:No results for input(s): NITRITE, COLORU, PHUR, LABCAST, WBCUA, RBCUA, MUCUS, TRICHOMONAS, YEAST, BACTERIA, CLARITYU, SPECGRAV, LEUKOCYTESUR, UROBILINOGEN, BILIRUBINUR, BLOODU, GLUCOSEU, AMORPHOUS in the last 72 hours. Invalid input(s): Anahy Veras    Radiology:    CXR: 2-view: No results found for this or any previous visit. Portable: Results for orders placed during the hospital encounter of 12/07/22    XR CHEST PORTABLE    Narrative  EXAMINATION:  ONE XRAY VIEW OF THE CHEST    12/7/2022 2:51 am    COMPARISON:  None. HISTORY:  ORDERING SYSTEM PROVIDED HISTORY: ETT placement following transport  TECHNOLOGIST PROVIDED HISTORY:  Reason for exam:->ETT placement following transport  What reading provider will be dictating this exam?->CRC    FINDINGS:  Endotracheal tube tip is at the thoracic inlet. NG tube is well within the  gastric lumen. There increased markings in both lungs, left greater than  right which are nonspecific but likely to relate to fibrosis. Normal heart  and pulmonary vascularity. Impression  Increased lung markings which are nonspecific but likely related to fibrosis.   Post endotracheal and nasogastric intubation. Assessment/Plan:    Acute on chronic hypoxic and hypercapnic respiratory failure-this secondary to COPD exacerbation from RSV and adenovirus. He was able to be extubated yesterday. He did tolerate the use of BiPAP overnight. He is currently on nasal cannula and doing well. His blood gas this morning did demonstrate continued acute on chronic hypercapnic respiratory acidosis. At this time he will continue with his current treatment regimen. Repeat blood gas should be obtained only if the patient changes from a clinical perspective. COPD exacerbation-he was able to be extubated yesterday. He is currently receiving scheduled breathing treatments as well as IV steroids. Given that he is tolerating enteral intake he can be changed to oral prednisone today. He does still have some wheezing upon examination, but is not in any distress. Strep pneumonia-his urinary antigen for Streptococcus is noted to be positive. He is currently receiving Rocephin for total of 7 doses. His last dose will be on 12/15. Nutrition: Oral diet as tolerated    Prophylaxis: Lovenox for DVT prophylaxis. Pepcid for GI prophylaxis. Code Status: Full code    He was able to be extubated yesterday. He is currently on nasal cannula oxygen. If he continues to do well today, he can likely be transferred out of the ICU later today.     Electronically signed by Rhea Kelley DO on 12/10/2022 at 7:28 AM

## 2022-12-10 NOTE — PROGRESS NOTES
[facility] OCCUPATIONAL THERAPY EVALUATION - ACUTE     NAME: Ivette Christensen  : 1945 (78 y.o.)  MRN: [id number]  CODE STATUS: Full Code  Room: [place] [address]    Date of Service: 12/10/2022    Patient Diagnosis(es): Acute respiratory failure [facility]) [J96.00]   Patient Active Problem List    Diagnosis Date Noted    Acute respiratory failure ([facility] 2022    Community acquired bacterial pneumonia 2022    Moderate malnutrition ([facility] 2022    COPD exacerbation ([facility] 2022        Past Medical History:   Diagnosis Date    COPD (chronic obstructive pulmonary disease) [facility]     Diabetes mellitus ([facility]      Past Surgical History:   Procedure Laterality Date    BRAIN SURGERY          Restrictions  Restrictions/Precautions: Fall Risk              Safety Devices: Safety Devices  Type of Devices: All fall risk precautions in place     Patient's date of birth confirmed: Yes    General:  Chart Reviewed: Yes  Patient assessed for rehabilitation services?: Yes    Subjective          Pain at start of treatment: Yes: 710    Pain at end of treatment: Yes: 710    Location: arthritic changes \"everywhere\"  Nursing notified: No  RN with another pt for medical emergency   Intervention: None    Prior Level of Function:  Social/Functional History  Lives With: Other (comment) (grandson)  Type of Home: House  Home Layout: Able to Live on Main level with bedroom/bathroom, Two level  Home Access: Level entry  Bathroom Shower/Tub: Tub/Shower unit  [employer]: Grab bars in shower, Shower chair, Hand-held shower  ADL Assistance: Independent  Ambulation Assistance: Independent (no AD)  Transfer Assistance: Independent  Active : Yes  Additional Comments: Difficulty to understand pt through CPAP, RN was going to remove prior to medical emergency in another room.     OBJECTIVE:     Orientation Status:  Orientation  Overall Orientation Status: Within Functional Limits    Observation:  Observation/Palpation  Posture: Good  Observation: IV, C-Pap, telemonitor, pulse ox, SCDs (unplugged)    Cognition Status:  Cognition  Overall Cognitive Status: WFL    Perception Status:  Perception  Overall Perceptual Status: WFL    Vision and Hearing Status:  Hearing  Hearing: Within functional limits        GROSS ASSESSMENT AROM/PROM:  AROM: Generally decreased, functional       ROM:   LUE AROM (degrees)  LUE General AROM: pt able to flex shoulder to 90° with report of pain d/t arthritis  Left Hand AROM (degrees)  Left Hand AROM: WFL  RUE AROM (degrees)  RUE General AROM: pt able to flex shoulder to 90° with report of pain d/t arthritis  Right Hand AROM (degrees)  Right Hand AROM: WFL    UE STRENGTH:  Strength: Generally decreased, functional    UE COORDINATION:  Coordination: Generally decreased, functional    UE TONE:  Tone: Normal    UE SENSATION:  Sensation: Intact (pt denies numbness and tingling)    Hand Dominance:  Hand Dominance  Hand Dominance: Right    ADL Status:  ADL  Feeding Skilled Clinical Factors: positive hand to mouth area over CPAP  Grooming: Setup  UE Bathing: Setup  LE Bathing: Stand by assistance  UE Dressing: Setup  LE Dressing: Stand by assistance  Toileting: Stand by assistance  Additional Comments: ADL's simulated unless otherwise stated above  Toilet Transfers  Toilet Transfer: Unable to assess    Functional Mobility:    Transfers  Sit to stand: Unable to assess  Stand to sit: Unable to assess    Patient ambulated: No , pt declined standing stating \"not allowed\"     Bed Mobility  Bed mobility  Rolling to Left: Supervision  Rolling to Right: Supervision  Supine to Sit: Stand by assistance  Sit to Supine: Stand by assistance  Bed Mobility Comments: HOB slightly raised; use of bed rails    Seated and Standing Balance:  Balance  Sitting: Intact  Standing:  (unable to assess, pt declined stating \"not allowed to stand\")    Functional Endurance:  Activity Tolerance  Activity Tolerance Comments: fair    D/C Recommendations:  OT D/C RECOMMENDATIONS  REQUIRES OT FOLLOW-UP: Yes    Equipment Recommendations:  OT Equipment Recommendations  Other: continue to assess    OT Education:   Patient Education  Education Given To: Patient  Education Provided: Role of Therapy;Plan of Care  Education Method: Verbal  Barriers to Learning: None  Education Outcome: Verbalized understanding    OT Follow Up:   OT D/C RECOMMENDATIONS  REQUIRES OT FOLLOW-UP: Yes       Assessment/Discharge Disposition:  Assessment: Pt is a 78 y.o. male with above mentioned deficits impairing ability to complete ADL's at reported baseline. Pt may benefit from OT services to address deficits and maximize safety and function during ADL's.   Performance deficits / Impairments: Decreased functional mobility , Decreased ADL status, Decreased strength, Decreased fine motor control  Prognosis: Good  Discharge Recommendations: Continue to assess pending progress  Decision Making: Medium Complexity  History: multi comorb  Exam: 4 perf imp  Assistance / Modification: SBA    [facility] Self care Score   How much help for putting on and taking off regular lower body clothing?: A Little  How much help for Bathing?: A Little  How much help for Toileting?: A Little  How much help for putting on and taking off regular upper body clothing?: A Little  How much help for taking care of personal grooming?: A Little  How much help for eating meals?: None  AM-PAC Inpatient Daily Activity Raw Score: 19  AM-PAC Inpatient ADL T-Scale Score : 40.22  ADL Inpatient CMS 0-100% Score: 42.8    Therapy key for assistance levels -   Independent/Mod I = Pt. is able to perform task with no assistance but may require a device   Stand by assistance = Pt. does not perform task at an independent level but does not need physical assistance, requires verbal cues  Minimal, Moderate, Maximal Assistance = Pt. requires physical assistance (25%, 50%, 75% assist from helper) for task but is able to actively participate in task   Dependent = Pt. requires total assistance with task and is not able to actively participate with task completion     Plan:  Occupational Therapy Plan  Times Per Week: 1-3  Therapy Duration:  (LOS)  Current Treatment Recommendations: Strengthening, Functional mobility training, Pain management, Safety education & training, Patient/Caregiver education & training, Self-Care / ADL, Equipment evaluation, education, & procurement    Goals:   Patient will:    - Improve functional endurance to tolerate/complete 20-30 mins of ADL's  - Be IND in UB ADLs   - Be  IND in LB ADLs  - Be IND in ADL transfers without LOB  - Improve B hand fine motor coordination to fair+ in order to manage clothing fasteners/self-care containers in a timely manner.  - Access appropriate D/C site with as few architectural barriers as possible. Patient Goal:    home       Therapy Time:   Individual   Time In 1500   Time Out 1510   Minutes 10          Eval: 10 minutes     Electronically signed by:     KELSIE Conley,   12/10/2022, 3:35 PM

## 2022-12-10 NOTE — PROGRESS NOTES
See OT evaluation for all goals and OT POC.  Electronically signed by ANIRUDH Neumann/L on 12/10/2022 at 3:38 PM

## 2022-12-10 NOTE — PROGRESS NOTES
Shift Summary:  1900: Bedside report received from Ab Ernandez RN.  2000:Head to toe assessment complete, patient alert and oriented x4, speech is clear, patient nods and gestures appropriately. Patient requesting bedtime snack, patient given snack and switched from BIPAP and  placed on to 5 liters/min Nasal cannula, O2 saturation above 94%. 2015: Patient done with snack requesting Bipap.  [id number]: Patient in bed eyes closed resting. 0100: Patient requesting something to drink and to take a break from Bipap at this time. Patient given fresh ice water and switched patient over to nasal cannula. 0115: Patient placed back on Bipap, patient stated he was ready to get some rest.   0400: Reassessment complete, no changes documented. 0550:Called Dr. Sydnie Larry regarding patient's ABG results this AM, updated Dr. Sydnie Larry on patient's current status, no new orders received at this time. 0645: BG 72, patient gien 4 ounces of orange juice. 5682: Bedside handoff report given to Burgess Bunny RN.

## 2022-12-11 LAB
ANION GAP SERPL CALCULATED.3IONS-SCNC: 1 MEQ/L (ref 9–15)
BASOPHILS ABSOLUTE: 0 K/UL (ref 0–0.2)
BASOPHILS RELATIVE PERCENT: 0.3 %
BUN BLDV-MCNC: 17 MG/DL (ref 8–23)
CALCIUM SERPL-MCNC: 8.9 MG/DL (ref 8.5–9.9)
CHLORIDE BLD-SCNC: 103 MEQ/L (ref 95–107)
CO2: 40 MEQ/L (ref 20–31)
CREAT SERPL-MCNC: 0.36 MG/DL (ref 0.7–1.2)
CULTURE: NORMAL
CULTURE: NORMAL
EOSINOPHILS ABSOLUTE: 0.1 K/UL (ref 0–0.7)
EOSINOPHILS RELATIVE PERCENT: 1.9 %
GFR SERPL CREATININE-BSD FRML MDRD: >60 ML/MIN/{1.73_M2}
GLUCOSE BLD-MCNC: 135 MG/DL (ref 70–99)
GLUCOSE BLD-MCNC: 190 MG/DL (ref 70–99)
GLUCOSE BLD-MCNC: 197 MG/DL (ref 70–99)
GLUCOSE BLD-MCNC: 86 MG/DL (ref 70–99)
GLUCOSE BLD-MCNC: 98 MG/DL (ref 70–99)
HCT VFR BLD CALC: 38.7 % (ref 42–52)
HEMOGLOBIN: 11.9 G/DL (ref 14–18)
LYMPHOCYTES ABSOLUTE: 0.9 K/UL (ref 1–4.8)
LYMPHOCYTES RELATIVE PERCENT: 16.8 %
Lab: NORMAL
Lab: NORMAL
MCH RBC QN AUTO: 25.7 PG (ref 27–31.3)
MCHC RBC AUTO-ENTMCNC: 30.8 % (ref 33–37)
MCV RBC AUTO: 83.5 FL (ref 79–92.2)
MONOCYTES ABSOLUTE: 0.6 K/UL (ref 0.2–0.8)
MONOCYTES RELATIVE PERCENT: 11.3 %
NEUTROPHILS ABSOLUTE: 3.8 K/UL (ref 1.4–6.5)
NEUTROPHILS RELATIVE PERCENT: 69.7 %
PDW BLD-RTO: 15.7 % (ref 11.5–14.5)
PERFORMED ON: ABNORMAL
PERFORMED ON: NORMAL
PLATELET # BLD: 219 K/UL (ref 130–400)
POTASSIUM REFLEX MAGNESIUM: 4.6 MEQ/L (ref 3.4–4.9)
RBC # BLD: 4.63 M/UL (ref 4.7–6.1)
SODIUM BLD-SCNC: 144 MEQ/L (ref 135–144)
SPECIMEN DESCRIPTION: NORMAL
SPECIMEN DESCRIPTION: NORMAL
WBC # BLD: 5.4 K/UL (ref 4.8–10.8)

## 2022-12-11 PROCEDURE — 6370000000 HC RX 637 (ALT 250 FOR IP): Performed by: INTERNAL MEDICINE

## 2022-12-11 PROCEDURE — 2580000003 HC RX 258: Performed by: INTERNAL MEDICINE

## 2022-12-11 PROCEDURE — 94761 N-INVAS EAR/PLS OXIMETRY MLT: CPT

## 2022-12-11 PROCEDURE — 1210000000 HC MED SURG R&B

## 2022-12-11 PROCEDURE — 36415 COLL VENOUS BLD VENIPUNCTURE: CPT

## 2022-12-11 PROCEDURE — 6360000002 HC RX W HCPCS: Performed by: INTERNAL MEDICINE

## 2022-12-11 PROCEDURE — 85025 COMPLETE CBC W/AUTO DIFF WBC: CPT

## 2022-12-11 PROCEDURE — 99233 SBSQ HOSP IP/OBS HIGH 50: CPT | Performed by: INTERNAL MEDICINE

## 2022-12-11 PROCEDURE — 80048 BASIC METABOLIC PNL TOTAL CA: CPT

## 2022-12-11 PROCEDURE — 2700000000 HC OXYGEN THERAPY PER DAY

## 2022-12-11 PROCEDURE — 94660 CPAP INITIATION&MGMT: CPT

## 2022-12-11 RX ADMIN — Medication 10 ML: at 09:04

## 2022-12-11 RX ADMIN — PREDNISONE 40 MG: 20 TABLET ORAL at 09:02

## 2022-12-11 RX ADMIN — MIDODRINE HYDROCHLORIDE 10 MG: 5 TABLET ORAL at 17:26

## 2022-12-11 RX ADMIN — DOCUSATE SODIUM 100 MG: 50 LIQUID ORAL at 21:04

## 2022-12-11 RX ADMIN — CEFTRIAXONE SODIUM 1000 MG: 1 INJECTION, POWDER, FOR SOLUTION INTRAMUSCULAR; INTRAVENOUS at 15:17

## 2022-12-11 RX ADMIN — ENOXAPARIN SODIUM 40 MG: 100 INJECTION SUBCUTANEOUS at 09:04

## 2022-12-11 RX ADMIN — FAMOTIDINE 20 MG: 20 TABLET, FILM COATED ORAL at 21:04

## 2022-12-11 RX ADMIN — MIDODRINE HYDROCHLORIDE 10 MG: 5 TABLET ORAL at 09:02

## 2022-12-11 RX ADMIN — Medication 10 ML: at 21:06

## 2022-12-11 RX ADMIN — LEVOTHYROXINE SODIUM 112 MCG: 112 TABLET ORAL at 06:24

## 2022-12-11 RX ADMIN — MIDODRINE HYDROCHLORIDE 10 MG: 5 TABLET ORAL at 13:14

## 2022-12-11 RX ADMIN — DOCUSATE SODIUM 100 MG: 50 LIQUID ORAL at 09:02

## 2022-12-11 RX ADMIN — FAMOTIDINE 20 MG: 20 TABLET, FILM COATED ORAL at 09:02

## 2022-12-11 RX ADMIN — ROSUVASTATIN CALCIUM 10 MG: 20 TABLET, FILM COATED ORAL at 09:02

## 2022-12-11 ASSESSMENT — PAIN DESCRIPTION - DESCRIPTORS: DESCRIPTORS: ACHING

## 2022-12-11 ASSESSMENT — PAIN SCALES - GENERAL
PAINLEVEL_OUTOF10: 0
PAINLEVEL_OUTOF10: 7

## 2022-12-11 ASSESSMENT — PAIN DESCRIPTION - LOCATION: LOCATION: GENERALIZED

## 2022-12-11 NOTE — CARE COORDINATION
Met with patient, freedom of choice offered. States plan is to return home with home care, galile[facility].  Requesting walker rx at [employer],, nurse aware

## 2022-12-11 NOTE — PROGRESS NOTES
INPATIENT PROGRESS NOTES    PATIENT NAME: Bennie Dior  MRN: [id number]  SERVICE DATE:  December 11, 2022   SERVICE TIME:  2:19 PM      PRIMARY SERVICE: Pulmonary Disease    CHIEF COMPLAIN: Respiratory failure, on BiPAP and O2      INTERVAL HPI: Patient seen and examined at bedside, Interval Notes, orders reviewed. Nursing notes noted  He is on 6 L O2 via nasal cannula O2 saturation 95%. He is using BiPAP during sleep. No fever or chills. No chest pain. No nausea vomiting diarrhea abdominal pain. OBJECTIVE    Body mass index is 20.38 kg/m². PHYSICAL EXAM:  Vitals:  /61   Pulse 85   Temp 97.7 °F (36.5 °C) (Oral)   Resp 22   Ht 5' 9\" (1.753 m)   Wt 138 lb 0.1 oz (62.6 kg)   SpO2 95%   BMI 20.38 kg/m²   General: Alert, awake . comfortable in bed, No distress. Head: Atraumatic , Normocephalic   Eyes: PERRL. No sclera icterus. No conjunctival injection. No discharge   ENT: No nasal  discharge. Pharynx clear. Neck:  Trachea midline. No thyromegaly, no JVD, No cervical adenopathy. Chest : Bilaterally symmetrical ,Normal effort,  No accessory muscle use  Lung : . Fair BS bilateral, decreased BS at bases. No Rales. Few scattered wheezing. No rhonchi. Heart[de-identified] Normal  rate. Regular rhythm. No mumur ,  Rub or gallop  ABD: Non-tender. Non-distended. No masses. No organmegaly. Normal bowel sounds. No hernia.   Ext : No Pitting both leg , No Cyanosis No clubbing  Neuro: no focal weakness          DATA:   Recent Labs     12/10/22  0413 12/10/22  0420 12/11/22  0641   WBC 4.4*  --  5.4   HGB 11.4* 13.2* 11.9*   HCT 36.8*  --  38.7*   MCV 83.2  --  83.5     --  219     Recent Labs     12/10/22  0413 12/10/22  0420 12/11/22  0641     --  144   K 4.2  --  4.6   CL 99  --  103   CO2 34*  --  40*   BUN 16  --  17   CREATININE 0.36* 0.5* 0.36*   GLUCOSE 124*  --  98   CALCIUM 8.6  --  8.9   LABGLOM >60.0 >60 >60.0       MV Settings:     Vent Mode: CPAP/PS  Vt (Set, mL): 450 mL  Resp Rate (Set): 16 bmp  FiO2 : 50 %  PEEP/CPAP (cmH2O): 5  Peak Inspiratory Pressure (cmH2O): 12 cmH2O  Mean Airway Pressure (cmH2O): 9.3 cmH20  I:E Ratio: 1:2.8    Recent Labs     12/10/22  0420   PHART 7.317*   FNE2NID 74*   PO2ART 90*   CAT5XDI 37.9*   BEART 12*   O2EMGXHP 96*       O2 Device: Nasal cannula  O2 Flow Rate (L/min): 5 L/min    ADULT DIET; Regular  ADULT ORAL NUTRITION SUPPLEMENT; Breakfast; Clear Liquid Oral Supplement  ADULT ORAL NUTRITION SUPPLEMENT; Lunch; Frozen Oral Supplement  ADULT ORAL NUTRITION SUPPLEMENT; Dinner; Standard High Calorie/High Protein Oral Supplement     MEDICATIONS during current hospitalization:    Continuous Infusions:   dextrose      sodium chloride         Scheduled Meds:   predniSONE  40 mg Oral Daily    famotidine  20 mg Oral BID    insulin lispro  0-4 Units SubCUTAneous TID WC    insulin lispro  0-4 Units SubCUTAneous Nightly    cefTRIAXone (ROCEPHIN) IV  1,000 mg IntraVENous Q24H    enoxaparin  40 mg SubCUTAneous Daily    levothyroxine  112 mcg Oral Daily    rosuvastatin  10 mg Oral Daily    midodrine  10 mg Oral TID WC    docusate  100 mg Oral BID    nicotine  1 patch TransDERmal Daily    lidocaine  5 mL IntraDERmal Once    sodium chloride flush  5-40 mL IntraVENous 2 times per day       PRN Meds:ondansetron **OR** ondansetron, polyethylene glycol, acetaminophen **OR** acetaminophen, glucose, dextrose bolus **OR** dextrose bolus, glucagon (rDNA), dextrose, sodium chloride flush, sodium chloride    Radiology  XR CHEST PORTABLE    Result Date: 12/10/2022  EXAMINATION: ONE XRAY VIEW OF THE CHEST 12/10/2022 5:38 am COMPARISON: 12/07/2022 HISTORY: ORDERING SYSTEM PROVIDED HISTORY: resp faillure TECHNOLOGIST PROVIDED HISTORY: Reason for exam:->resp faillure What reading provider will be dictating this exam?->CRC FINDINGS: The cardiac silhouette is within normal limits. Vague patchy bilateral airspace disease is noted.   In comparison with the patient's prior study the patchy airspace disease within the right has slightly worsened. The airspace disease within the left lung is unchanged. There is no right or left pleural effusion. Patchy bilateral airspace disease. In comparison with the patient's prior study of 12/07/2022 the right lung airspace disease has slightly worsened. There is no pleural effusion or pneumothorax. XR CHEST PORTABLE    Result Date: 12/7/2022  EXAMINATION: ONE XRAY VIEW OF THE CHEST 12/7/2022 2:51 am COMPARISON: None. HISTORY: ORDERING SYSTEM PROVIDED HISTORY: ETT placement following transport TECHNOLOGIST PROVIDED HISTORY: Reason for exam:->ETT placement following transport What reading provider will be dictating this exam?->CRC FINDINGS: Endotracheal tube tip is at the thoracic inlet. NG tube is well within the gastric lumen. There increased markings in both lungs, left greater than right which are nonspecific but likely to relate to fibrosis. Normal heart and pulmonary vascularity. Increased lung markings which are nonspecific but likely related to fibrosis. Post endotracheal and nasogastric intubation. XR CHEST PORTABLE    Result Date: 12/6/2022  EXAM: XR CHEST PORTABLE HISTORY: Reason for exam:->tube placement COMPARISON: December 6, 2022 at 1056 hours TECHNIQUE: An AP portable supine view of the chest was obtained for which the lateral aspect of the right hemithorax was excluded FINDINGS: There is patchy airspace disease within the infrahilar regions, with central bronchial wall thickening. The heart size is within normal limits, with atherosclerotic vascular calcification within the aorta. There is an endotracheal tube in place with the tip 4.5 cm above the adilene. The osseous structures are stable. 1. Interval placement of an endotracheal tube. 2. Bilateral infrahilar airspace disease may be associated with pulmonary edema and/or pneumonia. Recommend clinical correlation.      XR CHEST PORTABLE    Result Date: 12/6/2022  EXAMINATION: XR CHEST PORTABLE HISTORY: Reason for exam:->SOB COMPARISON: 1/9/2022 TECHNIQUE: AP portable FINDINGS: LUNGS: Bibasilar infiltrates, slightly increased from the prior exam. Increased interstitial lung markings likely chronic changes. Mild peribronchial thickening. VASCULATURE: No increased pulmonary vasculature. PLEURA: No pneumothorax, effusion, or pleural thickening. CARDIAC: No cardiomegaly or cardiac silhouette abnormality. MEDIASTINUM: No visible mass or adenopathy. BONES: No fracture or visible bone lesion. OTHER: Negative. Mild increase in bibasilar infiltrates         IMPRESSION AND SUGGESTION:  Acute on chronic hypoxic and hypercapnic respiratory failure secondary to COPD exacerbation from RSV and adenovirus. COPD exacerbation  Streptococcal pneumonia    Continue O2 and BiPAP therapy during sleep and prn as before. If continue to have acute on chronic hypoxia and respiratory acidosis. He is on nebulizer treatments, IV steroid and O2 to keep saturation 90% or above. He is receiving Rocephin 1 g every 24 hourly. Continue present treatment plan. Discussed with family at bedside. NOTE: This report was transcribed using voice recognition software. Every effort was made to ensure accuracy; however, inadvertent computerized transcription errors may be present.       Electronically signed by Dale Shaffer MD, [facility] on 12/11/2022 at 2:19 PM

## 2022-12-11 NOTE — PROGRESS NOTES
Assessment completed this shift. Alert and oriented x4. Incontinent of large amount urine this am. Bathed and linens changed by MONCHO Bearden. Impulsive at times. Puts legs over side of bed and sits up without calling for assistance. Reminded patient of falls protocol for safety. Bed alarm on. 95% on 5 L NC. Lungs diminished bilaterally. CO2 40 today. Spoke with Dr Sudha Cooper via phone to inform. Maura Schwartz he will see patient in rounds today.     Electronically signed by Erika Marcos RN on 12/11/2022 at 11:42 AM

## 2022-12-11 NOTE — FLOWSHEET NOTE
Assessment completed. VS WNL. A&OX4. Dr. Pierre Todd made aware that pt is now transferred to 4W via perfect serve. PM meds given. Lung diminished with rhonchi noted; On 5L NC. Abd sounds active. Droplet and Contact precautions in place/ Pt denies pain at the moment; stated he just have arthritis discomfort. Call light within reach. Bed in lowest position.

## 2022-12-11 NOTE — FLOWSHEET NOTE
Handoff of care to [facility] this evening and he called report to transfer patient to .  Electronically signed by Josee Sparks RN on 12/10/2022 at 7:57 PM

## 2022-12-12 VITALS
TEMPERATURE: 98.1 F | HEART RATE: 67 BPM | SYSTOLIC BLOOD PRESSURE: 126 MMHG | HEIGHT: 69 IN | OXYGEN SATURATION: 100 % | WEIGHT: 138.01 LBS | BODY MASS INDEX: 20.44 KG/M2 | DIASTOLIC BLOOD PRESSURE: 71 MMHG | RESPIRATION RATE: 18 BRPM

## 2022-12-12 LAB
ANION GAP SERPL CALCULATED.3IONS-SCNC: 4 MEQ/L (ref 9–15)
BASOPHILS ABSOLUTE: 0 K/UL (ref 0–0.2)
BASOPHILS RELATIVE PERCENT: 0.1 %
BUN BLDV-MCNC: 20 MG/DL (ref 8–23)
CALCIUM SERPL-MCNC: 9 MG/DL (ref 8.5–9.9)
CHLORIDE BLD-SCNC: 99 MEQ/L (ref 95–107)
CO2: 40 MEQ/L (ref 20–31)
CREAT SERPL-MCNC: 0.33 MG/DL (ref 0.7–1.2)
CULTURE: NORMAL
CULTURE: NORMAL
EOSINOPHILS ABSOLUTE: 0 K/UL (ref 0–0.7)
EOSINOPHILS RELATIVE PERCENT: 0.5 %
GFR SERPL CREATININE-BSD FRML MDRD: >60 ML/MIN/{1.73_M2}
GLUCOSE BLD-MCNC: 125 MG/DL (ref 70–99)
GLUCOSE BLD-MCNC: 132 MG/DL (ref 70–99)
GLUCOSE BLD-MCNC: 179 MG/DL (ref 70–99)
HCT VFR BLD CALC: 36.9 % (ref 42–52)
HEMOGLOBIN: 11.7 G/DL (ref 14–18)
LYMPHOCYTES ABSOLUTE: 1.1 K/UL (ref 1–4.8)
LYMPHOCYTES RELATIVE PERCENT: 19.1 %
Lab: NORMAL
Lab: NORMAL
MCH RBC QN AUTO: 26.3 PG (ref 27–31.3)
MCHC RBC AUTO-ENTMCNC: 31.8 % (ref 33–37)
MCV RBC AUTO: 82.8 FL (ref 79–92.2)
MONOCYTES ABSOLUTE: 0.6 K/UL (ref 0.2–0.8)
MONOCYTES RELATIVE PERCENT: 11.1 %
NEUTROPHILS ABSOLUTE: 3.8 K/UL (ref 1.4–6.5)
NEUTROPHILS RELATIVE PERCENT: 69.2 %
PDW BLD-RTO: 15.5 % (ref 11.5–14.5)
PERFORMED ON: ABNORMAL
PERFORMED ON: ABNORMAL
PLATELET # BLD: 240 K/UL (ref 130–400)
POTASSIUM REFLEX MAGNESIUM: 4.5 MEQ/L (ref 3.4–4.9)
RBC # BLD: 4.46 M/UL (ref 4.7–6.1)
SODIUM BLD-SCNC: 143 MEQ/L (ref 135–144)
SPECIMEN DESCRIPTION: NORMAL
SPECIMEN DESCRIPTION: NORMAL
WBC # BLD: 5.5 K/UL (ref 4.8–10.8)

## 2022-12-12 PROCEDURE — 85025 COMPLETE CBC W/AUTO DIFF WBC: CPT

## 2022-12-12 PROCEDURE — 6360000002 HC RX W HCPCS: Performed by: INTERNAL MEDICINE

## 2022-12-12 PROCEDURE — 2580000003 HC RX 258: Performed by: INTERNAL MEDICINE

## 2022-12-12 PROCEDURE — 6370000000 HC RX 637 (ALT 250 FOR IP): Performed by: INTERNAL MEDICINE

## 2022-12-12 PROCEDURE — 2700000000 HC OXYGEN THERAPY PER DAY

## 2022-12-12 PROCEDURE — 36415 COLL VENOUS BLD VENIPUNCTURE: CPT

## 2022-12-12 PROCEDURE — 99232 SBSQ HOSP IP/OBS MODERATE 35: CPT | Performed by: INTERNAL MEDICINE

## 2022-12-12 PROCEDURE — 80048 BASIC METABOLIC PNL TOTAL CA: CPT

## 2022-12-12 RX ORDER — PREDNISONE 10 MG/1
TABLET ORAL
Qty: 30 TABLET | Refills: 0 | Status: SHIPPED | OUTPATIENT
Start: 2022-12-12

## 2022-12-12 RX ORDER — AZITHROMYCIN 250 MG/1
250 TABLET, FILM COATED ORAL DAILY
Qty: 3 TABLET | Refills: 0 | Status: SHIPPED | OUTPATIENT
Start: 2022-12-12 | End: 2022-12-15

## 2022-12-12 RX ORDER — CEFPODOXIME PROXETIL 200 MG/1
200 TABLET, FILM COATED ORAL 2 TIMES DAILY
Qty: 6 TABLET | Refills: 0 | Status: SHIPPED | OUTPATIENT
Start: 2022-12-12 | End: 2022-12-15

## 2022-12-12 RX ADMIN — CEFTRIAXONE SODIUM 1000 MG: 1 INJECTION, POWDER, FOR SOLUTION INTRAMUSCULAR; INTRAVENOUS at 13:48

## 2022-12-12 RX ADMIN — ROSUVASTATIN CALCIUM 10 MG: 20 TABLET, FILM COATED ORAL at 08:54

## 2022-12-12 RX ADMIN — LEVOTHYROXINE SODIUM 112 MCG: 112 TABLET ORAL at 05:36

## 2022-12-12 RX ADMIN — Medication 10 ML: at 08:54

## 2022-12-12 RX ADMIN — MIDODRINE HYDROCHLORIDE 10 MG: 5 TABLET ORAL at 08:30

## 2022-12-12 RX ADMIN — ENOXAPARIN SODIUM 40 MG: 100 INJECTION SUBCUTANEOUS at 08:31

## 2022-12-12 RX ADMIN — FAMOTIDINE 20 MG: 20 TABLET, FILM COATED ORAL at 08:31

## 2022-12-12 RX ADMIN — PREDNISONE 40 MG: 20 TABLET ORAL at 08:31

## 2022-12-12 ASSESSMENT — PAIN DESCRIPTION - LOCATION: LOCATION: GENERALIZED

## 2022-12-12 ASSESSMENT — PAIN SCALES - GENERAL: PAINLEVEL_OUTOF10: 0

## 2022-12-12 NOTE — PROGRESS NOTES
Shift assessment complete/documented, alert/oriented, vss, 5L nc. Am medications given per orders, Denies further needs, call light within reach. Electronically signed by Bret Prajapati RN on 12/12/2022 at 9:50 AM  1100 Ambulated in room, tolerated well. Electronically signed by Bret Prajapati RN on 12/12/2022 at 11:46 AM  [phone] Discharge instructions provided to patient. Verbalized understanding of follow up appointments, diet, activity, medications and reasons to return to ED/call physician. All questions answered. Copy of discharge instructions provided. Awaiting wheelchair and taken to personal car. Denies further needs.    Electronically signed by Bret Prajapati RN on 12/12/2022 at 3:58 PM

## 2022-12-12 NOTE — FLOWSHEET NOTE
2104: Assessment completed. VS WNL. A&OX4. PM meds given. On 5LNC. Pt denies pain or further needs at the moment. Call light within reach. Bed in lowest position.      2140: RT put night Bipap on pt    2230: Pt took off Bipap; states it's uncomfortable; Back on 5L NC

## 2022-12-12 NOTE — PROGRESS NOTES
INPATIENT PROGRESS NOTES    PATIENT NAME: Mary Kay Reyes  MRN: [id number]  SERVICE DATE:  December 12, 2022   SERVICE TIME:  4:15 PM      PRIMARY SERVICE: Pulmonary Disease    CHIEF COMPLAIN: Respiratory failure, on BiPAP and O2      INTERVAL HPI: Patient seen and examined at bedside, Interval Notes, orders reviewed. Nursing notes noted  He is on 5 L O2 via nasal cannula O2 saturation 100% he is using BiPAP during sleep. No fever or chills. No chest pain. No nausea vomiting diarrhea abdominal pain. He is going home today and he will follow-up with his pulmonologist.    OBJECTIVE    Body mass index is 20.38 kg/m². PHYSICAL EXAM:  Vitals:  /71   Pulse 67   Temp 98.1 °F (36.7 °C) (Oral)   Resp 18   Ht 5' 9\" (1.753 m)   Wt 138 lb 0.1 oz (62.6 kg)   SpO2 100%   BMI 20.38 kg/m²   General: Alert, awake . comfortable in bed, No distress. Head: Atraumatic , Normocephalic   Eyes: PERRL. No sclera icterus. No conjunctival injection. No discharge   ENT: No nasal  discharge. Pharynx clear. Neck:  Trachea midline. No thyromegaly, no JVD, No cervical adenopathy. Chest : Bilaterally symmetrical ,Normal effort,  No accessory muscle use  Lung : . Fair BS bilateral, decreased BS at bases. No Rales. Few scattered wheezing. No rhonchi. Heart[de-identified] Normal  rate. Regular rhythm. No mumur ,  Rub or gallop  ABD: Non-tender. Non-distended. No masses. No organmegaly. Normal bowel sounds. No hernia.   Ext : No Pitting both leg , No Cyanosis No clubbing  Neuro: no focal weakness          DATA:   Recent Labs     12/11/22  0641 12/12/22 0628   WBC 5.4 5.5   HGB 11.9* 11.7*   HCT 38.7* 36.9*   MCV 83.5 82.8    240     Recent Labs     12/11/22  0641 12/12/22 0628    143   K 4.6 4.5    99   CO2 40* 40*   BUN 17 20   CREATININE 0.36* 0.33*   GLUCOSE 98 132*   CALCIUM 8.9 9.0   LABGLOM >60.0 >60.0       MV Settings:     Vent Mode: CPAP/PS  Vt (Set, mL): 450 mL  Resp Rate (Set): 16 bmp  FiO2 : 50 %  PEEP/CPAP (cmH2O): 5  Peak Inspiratory Pressure (cmH2O): 12 cmH2O  Mean Airway Pressure (cmH2O): 9.3 cmH20  I:E Ratio: 1:2.8    Recent Labs     12/10/22  0420   PHART 7.317*   NBH6JAG 74*   PO2ART 90*   BLK9KTT 37.9*   BEART 12*   [id number] 96*       O2 Device: Nasal cannula  O2 Flow Rate (L/min): 5 L/min    ADULT DIET; Regular  ADULT ORAL NUTRITION SUPPLEMENT; Breakfast; Clear Liquid Oral Supplement  ADULT ORAL NUTRITION SUPPLEMENT; Lunch; Frozen Oral Supplement  ADULT ORAL NUTRITION SUPPLEMENT; Dinner; Standard High Calorie/High Protein Oral Supplement     MEDICATIONS during current hospitalization:    Continuous Infusions:   dextrose      sodium chloride         Scheduled Meds:   predniSONE  40 mg Oral Daily    famotidine  20 mg Oral BID    insulin lispro  0-4 Units SubCUTAneous TID WC    insulin lispro  0-4 Units SubCUTAneous Nightly    cefTRIAXone (ROCEPHIN) IV  1,000 mg IntraVENous Q24H    enoxaparin  40 mg SubCUTAneous Daily    levothyroxine  112 mcg Oral Daily    rosuvastatin  10 mg Oral Daily    docusate  100 mg Oral BID    nicotine  1 patch TransDERmal Daily    lidocaine  5 mL IntraDERmal Once    sodium chloride flush  5-40 mL IntraVENous 2 times per day       PRN Meds:ondansetron **OR** ondansetron, polyethylene glycol, acetaminophen **OR** acetaminophen, glucose, dextrose bolus **OR** dextrose bolus, glucagon (rDNA), dextrose, sodium chloride flush, sodium chloride    Radiology  XR CHEST PORTABLE    Result Date: 12/10/2022  EXAMINATION: ONE XRAY VIEW OF THE CHEST 12/10/2022 5:38 am COMPARISON: 12/07/2022 HISTORY: ORDERING SYSTEM PROVIDED HISTORY: resp faillure TECHNOLOGIST PROVIDED HISTORY: Reason for exam:->resp faillure What reading provider will be dictating this exam?->CRC FINDINGS: The cardiac silhouette is within normal limits. Vague patchy bilateral airspace disease is noted. In comparison with the patient's prior study the patchy airspace disease within the right has slightly worsened.   The airspace disease within the left lung is unchanged. There is no right or left pleural effusion. Patchy bilateral airspace disease. In comparison with the patient's prior study of 12/07/2022 the right lung airspace disease has slightly worsened. There is no pleural effusion or pneumothorax. XR CHEST PORTABLE    Result Date: 12/7/2022  EXAMINATION: ONE XRAY VIEW OF THE CHEST 12/7/2022 2:51 am COMPARISON: None. HISTORY: ORDERING SYSTEM PROVIDED HISTORY: ETT placement following transport TECHNOLOGIST PROVIDED HISTORY: Reason for exam:->ETT placement following transport What reading provider will be dictating this exam?->CRC FINDINGS: Endotracheal tube tip is at the thoracic inlet. NG tube is well within the gastric lumen. There increased markings in both lungs, left greater than right which are nonspecific but likely to relate to fibrosis. Normal heart and pulmonary vascularity. Increased lung markings which are nonspecific but likely related to fibrosis. Post endotracheal and nasogastric intubation. XR CHEST PORTABLE    Result Date: 12/6/2022  EXAM: XR CHEST PORTABLE HISTORY: Reason for exam:->tube placement COMPARISON: December 6, 2022 at 1056 hours TECHNIQUE: An AP portable supine view of the chest was obtained for which the lateral aspect of the right hemithorax was excluded FINDINGS: There is patchy airspace disease within the infrahilar regions, with central bronchial wall thickening. The heart size is within normal limits, with atherosclerotic vascular calcification within the aorta. There is an endotracheal tube in place with the tip 4.5 cm above the adilene. The osseous structures are stable. 1. Interval placement of an endotracheal tube. 2. Bilateral infrahilar airspace disease may be associated with pulmonary edema and/or pneumonia. Recommend clinical correlation.      XR CHEST PORTABLE    Result Date: 12/6/2022  EXAMINATION: XR CHEST PORTABLE HISTORY: Reason for exam:->SOB COMPARISON: 1/9/2022 TECHNIQUE: AP portable FINDINGS: LUNGS: Bibasilar infiltrates, slightly increased from the prior exam. Increased interstitial lung markings likely chronic changes. Mild peribronchial thickening. VASCULATURE: No increased pulmonary vasculature. PLEURA: No pneumothorax, effusion, or pleural thickening. CARDIAC: No cardiomegaly or cardiac silhouette abnormality. MEDIASTINUM: No visible mass or adenopathy. BONES: No fracture or visible bone lesion. OTHER: Negative. Mild increase in bibasilar infiltrates         IMPRESSION AND SUGGESTION:  Acute on chronic hypoxic and hypercapnic respiratory failure secondary to COPD exacerbation from RSV and adenovirus. Improved  COPD exacerbation, improved  Streptococcal pneumonia    He is going home today. He is going with p.o. antibiotic, continue nebulizer treatment with albuterol and Symbicort and Spiriva. Continue Mucinex. Address is going with tapered dose of prednisone p.o. Zithromax for 3 more days and Vantin 200 mg twice daily for 3 days. Continue O2 as before. Grandson at bedside. He will follow-up with his pulmonologist in 1 week. NOTE: This report was transcribed using voice recognition software. Every effort was made to ensure accuracy; however, inadvertent computerized transcription errors may be present.       Electronically signed by Jose Jennings MD, FCCP on 12/12/2022 at 4:15 PM

## 2022-12-12 NOTE — DISCHARGE SUMMARY
Hospital Medicine Discharge Summary    Elma Walker  :  1945  MRN:  [id number]    Admit date:  2022  Discharge date:  2022    Admitting Physician:  Carmen Sharp DO  Primary Care Physician:  Chayito Nolan MD    Elma Walker is a 78 y.o. male that was admitted and treated for the following medical issues:     Principal Problem:    Acute respiratory failure [facility])  Resolved Problems:    * No resolved hospital problems. *      Discharge Diagnoses:    Principal Problem:    Acute respiratory failure ([address]  Resolved Problems:    * No resolved hospital problems. *    No chief complaint on file. Hospital Course:   Elma Walker is a 78 y.o. male who was admitted with respiratory failure secondary to viral pneumonia with adenovirus and respiratory syncytial virus. Additionally, component of superimposed bacterial infection was suspected. Transition from IV antibiotics to oral antibiotics discharged with outpatient follow-up. Pt was discharge in a stable condition. /71   Pulse 67   Temp 98.1 °F (36.7 °C) (Oral)   Resp 18   Ht 5' 9\" (1.753 m)   Wt 138 lb 0.1 oz (62.6 kg)   SpO2 100%   BMI 20.38 kg/m²     Patient was seen by the following consultants  Consults:  IP CONSULT TO DIETITIAN  IP CONSULT TO PULMONOLOGY    Significant Diagnostic Studies:    Refer to chart if  XR CHEST PORTABLE    Result Date: 2022  EXAMINATION: ONE XRAY VIEW OF THE CHEST 2022 2:51 am COMPARISON: None. HISTORY: ORDERING SYSTEM PROVIDED HISTORY: ETT placement following transport TECHNOLOGIST PROVIDED HISTORY: Reason for exam:->ETT placement following transport What reading provider will be dictating this exam?->CRC FINDINGS: Endotracheal tube tip is at the thoracic inlet. NG tube is well within the gastric lumen. There increased markings in both lungs, left greater than right which are nonspecific but likely to relate to fibrosis. Normal heart and pulmonary vascularity.      Increased lung markings which are nonspecific but likely related to fibrosis. Post endotracheal and nasogastric intubation.        Discharge Medications:         Medication List        START taking these medications      azithromycin 250 MG tablet  Commonly known as: ZITHROMAX  Take 1 tablet by mouth daily for 3 days     cefpodoxime 200 MG tablet  Commonly known as: VANTIN  Take 1 tablet by mouth 2 times daily for 3 days     predniSONE 10 MG tablet  Commonly known as: DELTASONE  40mg daily x3 days, then 30mg daily x 3 days, then 20mg daily x3 days, then 10mg x daily x 3days            CONTINUE taking these medications      albuterol (2.5 MG/3ML) 0.083% nebulizer solution  Commonly known as: PROVENTIL  Take 3 mLs by nebulization every 2 hours as needed for Wheezing     budesonide-formoterol 160-4.5 MCG/ACT Aero  Commonly known as: SYMBICORT  Inhale 2 puffs into the lungs 2 times daily     celecoxib 200 MG capsule  Commonly known as: CELEBREX     fluticasone-salmeterol 500-50 MCG/ACT Aepb diskus inhaler  Commonly known as: ADVAIR     guaiFENesin 600 MG extended release tablet  Commonly known as: MUCINEX  Take 1 tablet by mouth 2 times daily     levothyroxine 112 MCG tablet  Commonly known as: SYNTHROID     metFORMIN 500 MG tablet  Commonly known as: GLUCOPHAGE     naproxen 375 MG tablet  Commonly known as: NAPROSYN  Take 1 tablet by mouth 2 times daily (with meals)     rosuvastatin 10 MG tablet  Commonly known as: CRESTOR     tiotropium 2.5 MCG/ACT Aers inhaler  Commonly known as: SPIRIVA RESPIMAT     topiramate 25 MG tablet  Commonly known as: TOPAMAX               Where to Get Your Medications        These medications were sent to [address] [place] [address]      Phone: [phone]   azithromycin 250 MG tablet  cefpodoxime 200 MG tablet  predniSONE 10 MG tablet           Disposition:   If discharged to Home, Any [place] [address] needs that were

## 2022-12-12 NOTE — PROGRESS NOTES
Hospitalist Progress Note      Date of Admission: 12/7/2022  Chief Complaint:    No chief complaint on file. Subjective:  No new complaints. No nausea, vomiting, chest pain, or headache      Medications:    Infusion Medications    dextrose      sodium chloride       Scheduled Medications    predniSONE  40 mg Oral Daily    famotidine  20 mg Oral BID    insulin lispro  0-4 Units SubCUTAneous TID     insulin lispro  0-4 Units SubCUTAneous Nightly    cefTRIAXone (ROCEPHIN) IV  1,000 mg IntraVENous Q24H    enoxaparin  40 mg SubCUTAneous Daily    levothyroxine  112 mcg Oral Daily    rosuvastatin  10 mg Oral Daily    midodrine  10 mg Oral TID     docusate  100 mg Oral BID    nicotine  1 patch TransDERmal Daily    lidocaine  5 mL IntraDERmal Once    sodium chloride flush  5-40 mL IntraVENous 2 times per day     PRN Meds: ondansetron **OR** ondansetron, polyethylene glycol, acetaminophen **OR** acetaminophen, glucose, dextrose bolus **OR** dextrose bolus, glucagon (rDNA), dextrose, sodium chloride flush, sodium chloride    Intake/Output Summary (Last 24 hours) at 12/12/2022 1002  Last data filed at 12/12/2022 0548  Gross per 24 hour   Intake 865 ml   Output 1750 ml   Net -885 ml       Exam:  /71   Pulse 67   Temp 98.1 °F (36.7 °C) (Oral)   Resp 18   Ht 5' 9\" (1.753 m)   Wt 138 lb 0.1 oz (62.6 kg)   SpO2 100%   BMI 20.38 kg/m²   Head: Normocephalic, atraumatic  Sclera clear  Neck JVD flat  Lungs: Few scattered crackles, normal effort of breathing    Labs:   Recent Labs     12/10/22  0413 12/10/22  0420 12/11/22  0641 12/12/22  0628   WBC 4.4*  --  5.4 5.5   HGB 11.4* 13.2* 11.9* 11.7*   HCT 36.8*  --  38.7* 36.9*     --  219 240       Recent Labs     12/10/22  0413 12/10/22  0420 12/11/22  0641     --  144   K 4.2  --  4.6   CL 99  --  103   CO2 34*  --  40*   BUN 16  --  17   CREATININE 0.36* 0.5* 0.36*   CALCIUM 8.6  --  8.9       No results for input(s): INR in the last 72 hours.     No results for input(s): Madelaine Tran in the last 72 hours. Radiology:  XR CHEST PORTABLE   Final Result   Patchy bilateral airspace disease. In comparison with the patient's prior   study of 12/07/2022 the right lung airspace disease has slightly worsened. There is no pleural effusion or pneumothorax. XR CHEST PORTABLE   Final Result   Increased lung markings which are nonspecific but likely related to fibrosis. Post endotracheal and nasogastric intubation. Assessment/Plan:    Acute on chronic hypoxic and hypercapnic respiratory failure secondary to COPD exacerbation from viral infection as evidenced by RSV and adenovirus positive serology. Extubated 12/9  FIO2 req improving. Increase oob to chair, ambulation. COPD exacerbation: Steroids, bronchodilators. Following with pulmonology. Community-acquired pneumonia: Covering for bacterial pneumonia with IV antibiotics in coordination with pulmonology. Hypothyroidism: Continue medication.   Hyperlipidemia: Continue rosuvastatin    35 minutes total care time, >1/2 in unit/floor time and care coordination       Anthony Monreal MD

## 2022-12-12 NOTE — PROGRESS NOTES
Patient seen and examined with bedside nurse, family also present at bedside. Patient able to sit up independently, transfer independently, stand up on his own independently and ambulate independently. Due to short tubing, on my examination only a short distance was covered. RN to  ambulate again with longer tubing. Anticipate discharge later today.   Patient takes oxygen at home, oxygen tank will be provided for transport as per discussion with discharge planning team.    Adalberto Arriaza MD

## 2022-12-13 NOTE — PROGRESS NOTES
Physical Therapy  Facility/Department: [facility] MED SURG Z540/Z530-30  Physical Therapy Discharge      NAME: Juan Carlos Ramesh    : 1945 (78 y.o.)  MRN: [id number]    Account: [de-identified]  Gender: male      Patient has been discharged from acute care hospital. DC patient from current PT program.      Electronically signed by Jb Birmingham PT on 22 at 12:23 PM EST

## 2022-12-15 NOTE — PROGRESS NOTES
Physician Progress Note      PATIENT:               Austin Colon  CSN #:                  [id number]  :                       1945  ADMIT DATE:       2022 3:00 AM  DISCH DATE:        2022 4:35 PM  RESPONDING  PROVIDER #:        Ana Pretty MD          QUERY TEXT:    Pt admitted with RSV Pneumonia. Noted on admission to have Temp 95. 7(R), HR   47-60, BP 67/37-75/53 with MAPS 47-60, pO2 77.2. Pt noted on  to have temp   100.6 with RR 25, on MV with pO2 96 on 55% FiO2. If possible, please document   in the progress notes and discharge summary if you are evaluating and /or   treating any of the following: The medical record reflects the following:  Risk Factors: RSV pneumonia, acute on chronic respiratory failure with   hypoxia, moderate malnutrition, DMII and encephalopathy  Clinical Indicators: Noted on admission to have Temp 95. 7(R), HR 47-60, BP   67/37-75/53 with MAPS 47-60, pO2 77.2. Pt noted on  to have temp 100.6   with RR 25, on MV with pO2 96 on 55% FiO2. Treatment: ICU Care, IVFB LR 2L, IV Levophed, IV Zosyn, BCX2, labs and   monitoring    Thank you,  Meri Brown   Clinical Documentation Improvement Specialist  W: [phone]  Options provided:  -- Sepsis due to RSV pneumonia, present on admission  -- Sever Sepsis due to RSV pneumonia, present on admission  -- Sepsis, not present on admission  -- Severe Sepsis due to RSV pneumonia, not present on admission  -- RSV pneumonia without Sepsis  -- Other - I will add my own diagnosis  -- Disagree - Not applicable / Not valid  -- Disagree - Clinically unable to determine / Unknown  -- Refer to Clinical Documentation Reviewer    PROVIDER RESPONSE TEXT:    This patient has sepsis due to RSV pneumonia present on admission.     Query created by: Jacinta Flowers on 2022 10:53 AM      Electronically signed by:  Ana Pretty MD 12/15/2022 12:07 PM

## 2023-07-18 NOTE — ED NOTES
Evan Gonzalez, [phone], call granddaughter for updates.       Tenna Leyden, RN  01/09/22 5510
Olga Willy (pts daughter) would like to get a call with transfer information at [phone].      Heather Webster RN  01/10/22 3002
Patient is incontinent of urine. Complete linen change and patient repositioned in bed.         Jose Mcbride RN  01/10/22 1526
Pt given meal tray.   Covered in blankets per request.     Chalmer Fleischer, RN  01/10/22 4487
Pt given urinal and repositioned in bed.        Shanique Marcos RN  01/09/22 2015
Pt placed on 10 liters high flow nasal cannula to eat.        Fallon Schwartz RN  01/09/22 212
Pt requesting food. Spoke with  and he ok'd high flow cannula to allow pt to eat for a short period of time but then must go back on the bipap.        Chalmer Fleischer, RN  01/09/22 3607
Pts daughter yovana on the phone, wanting update, ok to give Garretson update per pts ok to speak with her.        Bárbara Delvalle RN  01/10/22 5925
RT at [facility] for ABG draw.       Jessie Hankins RN  01/09/22 3827
RT at [facility] to redraw blood gasses.       Yesenia Chao RN  01/09/22 0119
Rt at [facility] for Bipap placement.       Adithya Portillo RN  01/09/22 8381
Spoke with Huma Ricci, pts grandson, updated on plan of care to keep here and admit to inpatient.       Teresa Toledo RN  01/10/22 5021
Spoke with pts daughter Cherelle Traore, informed of update and plan to keep pt here.       Harvey Orantes, LAVERNE  01/10/22 6974
This nurse to answer call light for pain, toilet, and water. This nurse to assist pt to use urinal, straightened bedding, provided new ice water and called for a meal tray. Spoke to chase about pt needing pain medication. He is to put in orders for pain medication. Pt updated on plan of care to go to [facility], pt states, \"I'll just go home, I'm not going up there. \"       Mark Hernandez RN  01/10/22 5759
Xray at [facility]     Edward Harrison RN  01/09/22 8321
Show Aperture Variable?: Yes
Render Post-Care Instructions In Note?: no
Consent: The patient's consent was obtained including but not limited to risks of crusting, scabbing, blistering, scarring, darker or lighter pigmentary change, recurrence, incomplete removal and infection.
Duration Of Freeze Thaw-Cycle (Seconds): 0
Detail Level: Detailed
Post-Care Instructions: I reviewed with the patient in detail post-care instructions. Patient is to wear sunprotection, and avoid picking at any of the treated lesions. Pt may apply Vaseline to crusted or scabbing areas.

## 2023-11-28 ENCOUNTER — HOSPITAL ENCOUNTER (INPATIENT)
Age: 78
LOS: 2 days | Discharge: NURSING, REHAB OR LONG-TERM CARE | DRG: 193 | End: 2023-11-30
Attending: FAMILY MEDICINE | Admitting: INTERNAL MEDICINE
Payer: MEDICARE

## 2023-11-28 ENCOUNTER — APPOINTMENT (OUTPATIENT)
Dept: GENERAL RADIOLOGY | Age: 78
DRG: 193 | End: 2023-11-28
Payer: MEDICARE

## 2023-11-28 ENCOUNTER — APPOINTMENT (OUTPATIENT)
Dept: CT IMAGING | Age: 78
DRG: 193 | End: 2023-11-28
Payer: MEDICARE

## 2023-11-28 DIAGNOSIS — R19.5 POSITIVE FECAL OCCULT BLOOD TEST: ICD-10-CM

## 2023-11-28 DIAGNOSIS — Z87.09 HISTORY OF COPD: ICD-10-CM

## 2023-11-28 DIAGNOSIS — J18.9 COMMUNITY ACQUIRED PNEUMONIA OF LEFT LUNG, UNSPECIFIED PART OF LUNG: Primary | ICD-10-CM

## 2023-11-28 PROBLEM — J16.0 CAP (COMMUNITY ACQUIRED PNEUMONIA) DUE TO CHLAMYDIA SPECIES: Status: ACTIVE | Noted: 2023-11-28

## 2023-11-28 LAB
ANION GAP SERPL CALCULATED.3IONS-SCNC: 5 MMOL/L (ref 9–17)
BASOPHILS # BLD: 0.04 K/UL (ref 0–0.2)
BASOPHILS NFR BLD: 0 % (ref 0–2)
BILIRUB UR QL STRIP: NEGATIVE
BUN SERPL-MCNC: 21 MG/DL (ref 8–23)
BUN/CREAT SERPL: 35 (ref 9–20)
CALCIUM SERPL-MCNC: 9.6 MG/DL (ref 8.6–10.4)
CHLORIDE SERPL-SCNC: 93 MMOL/L (ref 98–107)
CHP ED QC CHECK: YES
CLARITY UR: CLEAR
CO2 SERPL-SCNC: 34 MMOL/L (ref 20–31)
COLOR UR: YELLOW
COMMENT: ABNORMAL
CREAT SERPL-MCNC: 0.6 MG/DL (ref 0.7–1.2)
EOSINOPHIL # BLD: 0.24 K/UL (ref 0–0.4)
EOSINOPHILS RELATIVE PERCENT: 2 % (ref 0–5)
ERYTHROCYTE [DISTWIDTH] IN BLOOD BY AUTOMATED COUNT: 14 % (ref 12.1–15.2)
GFR SERPL CREATININE-BSD FRML MDRD: >60 ML/MIN/1.73M2
GLUCOSE BLD-MCNC: 92 MG/DL (ref 65–99)
GLUCOSE SERPL-MCNC: 106 MG/DL (ref 70–99)
GLUCOSE UR STRIP-MCNC: NEGATIVE MG/DL
HCT VFR BLD AUTO: 30.5 % (ref 41–53)
HEMOCCULT STL QL: POSITIVE
HGB BLD-MCNC: 9.3 G/DL (ref 13.5–17.5)
HGB UR QL STRIP.AUTO: NEGATIVE
IMM GRANULOCYTES # BLD AUTO: 0.02 K/UL (ref 0–0.3)
IMM GRANULOCYTES NFR BLD: 0 % (ref 0–5)
KETONES UR STRIP-MCNC: NEGATIVE MG/DL
LACTATE BLDV-SCNC: 1.3 MMOL/L (ref 0.5–2.2)
LEUKOCYTE ESTERASE UR QL STRIP: NEGATIVE
LYMPHOCYTES NFR BLD: 0.86 K/UL (ref 1–4.8)
LYMPHOCYTES RELATIVE PERCENT: 8 % (ref 13–44)
MCH RBC QN AUTO: 25 PG (ref 26–34)
MCHC RBC AUTO-ENTMCNC: 30.5 G/DL (ref 31–37)
MCV RBC AUTO: 82 FL (ref 80–100)
MONOCYTES NFR BLD: 0.93 K/UL (ref 0–1)
MONOCYTES NFR BLD: 9 % (ref 5–9)
NEUTROPHILS NFR BLD: 80 % (ref 39–75)
NEUTS SEG NFR BLD: 8.58 K/UL (ref 2.1–6.5)
NITRITE UR QL STRIP: NEGATIVE
PH UR STRIP: 7 [PH] (ref 5–8)
PLATELET # BLD AUTO: 318 K/UL (ref 140–450)
PMV BLD AUTO: 9.5 FL (ref 6–12)
POTASSIUM SERPL-SCNC: 4.3 MMOL/L (ref 3.7–5.3)
PROT UR STRIP-MCNC: ABNORMAL MG/DL
RBC # BLD AUTO: 3.72 M/UL (ref 4.5–5.9)
SARS-COV-2 RDRP RESP QL NAA+PROBE: NOT DETECTED
SODIUM SERPL-SCNC: 132 MMOL/L (ref 135–144)
SP GR UR STRIP: 1.01 (ref 1–1.03)
SPECIMEN DESCRIPTION: NORMAL
UROBILINOGEN UR STRIP-ACNC: NORMAL EU/DL (ref 0–1)
WBC OTHER # BLD: 10.7 K/UL (ref 3.5–11)

## 2023-11-28 PROCEDURE — 94640 AIRWAY INHALATION TREATMENT: CPT

## 2023-11-28 PROCEDURE — 82947 ASSAY GLUCOSE BLOOD QUANT: CPT

## 2023-11-28 PROCEDURE — 80048 BASIC METABOLIC PNL TOTAL CA: CPT

## 2023-11-28 PROCEDURE — 94664 DEMO&/EVAL PT USE INHALER: CPT

## 2023-11-28 PROCEDURE — 36415 COLL VENOUS BLD VENIPUNCTURE: CPT

## 2023-11-28 PROCEDURE — 87040 BLOOD CULTURE FOR BACTERIA: CPT

## 2023-11-28 PROCEDURE — 83605 ASSAY OF LACTIC ACID: CPT

## 2023-11-28 PROCEDURE — 85025 COMPLETE CBC W/AUTO DIFF WBC: CPT

## 2023-11-28 PROCEDURE — 87635 SARS-COV-2 COVID-19 AMP PRB: CPT

## 2023-11-28 PROCEDURE — 6360000002 HC RX W HCPCS: Performed by: INTERNAL MEDICINE

## 2023-11-28 PROCEDURE — 6360000004 HC RX CONTRAST MEDICATION: Performed by: FAMILY MEDICINE

## 2023-11-28 PROCEDURE — 2700000000 HC OXYGEN THERAPY PER DAY

## 2023-11-28 PROCEDURE — 6360000002 HC RX W HCPCS: Performed by: FAMILY MEDICINE

## 2023-11-28 PROCEDURE — 1200000000 HC SEMI PRIVATE

## 2023-11-28 PROCEDURE — C9803 HOPD COVID-19 SPEC COLLECT: HCPCS

## 2023-11-28 PROCEDURE — 94761 N-INVAS EAR/PLS OXIMETRY MLT: CPT

## 2023-11-28 PROCEDURE — 93005 ELECTROCARDIOGRAM TRACING: CPT | Performed by: FAMILY MEDICINE

## 2023-11-28 PROCEDURE — 2580000003 HC RX 258: Performed by: INTERNAL MEDICINE

## 2023-11-28 PROCEDURE — 81003 URINALYSIS AUTO W/O SCOPE: CPT

## 2023-11-28 PROCEDURE — 71045 X-RAY EXAM CHEST 1 VIEW: CPT

## 2023-11-28 PROCEDURE — 71260 CT THORAX DX C+: CPT

## 2023-11-28 PROCEDURE — 83036 HEMOGLOBIN GLYCOSYLATED A1C: CPT

## 2023-11-28 PROCEDURE — 6370000000 HC RX 637 (ALT 250 FOR IP): Performed by: FAMILY MEDICINE

## 2023-11-28 PROCEDURE — 99285 EMERGENCY DEPT VISIT HI MDM: CPT

## 2023-11-28 PROCEDURE — 2580000003 HC RX 258: Performed by: FAMILY MEDICINE

## 2023-11-28 RX ORDER — SODIUM CHLORIDE FOR INHALATION 0.9 %
3 VIAL, NEBULIZER (ML) INHALATION EVERY 8 HOURS PRN
Status: DISCONTINUED | OUTPATIENT
Start: 2023-11-28 | End: 2023-11-30 | Stop reason: HOSPADM

## 2023-11-28 RX ORDER — ONDANSETRON 4 MG/1
4 TABLET, ORALLY DISINTEGRATING ORAL EVERY 8 HOURS PRN
Status: DISCONTINUED | OUTPATIENT
Start: 2023-11-28 | End: 2023-11-30 | Stop reason: HOSPADM

## 2023-11-28 RX ORDER — ENOXAPARIN SODIUM 100 MG/ML
40 INJECTION SUBCUTANEOUS DAILY
Status: DISCONTINUED | OUTPATIENT
Start: 2023-11-29 | End: 2023-11-30 | Stop reason: HOSPADM

## 2023-11-28 RX ORDER — DEXTROSE MONOHYDRATE 100 MG/ML
INJECTION, SOLUTION INTRAVENOUS CONTINUOUS PRN
Status: DISCONTINUED | OUTPATIENT
Start: 2023-11-28 | End: 2023-11-30 | Stop reason: HOSPADM

## 2023-11-28 RX ORDER — ROSUVASTATIN CALCIUM 10 MG/1
10 TABLET, COATED ORAL DAILY
Status: DISCONTINUED | OUTPATIENT
Start: 2023-11-29 | End: 2023-11-30 | Stop reason: HOSPADM

## 2023-11-28 RX ORDER — SODIUM CHLORIDE 0.9 % (FLUSH) 0.9 %
5-40 SYRINGE (ML) INJECTION PRN
Status: DISCONTINUED | OUTPATIENT
Start: 2023-11-28 | End: 2023-11-30 | Stop reason: HOSPADM

## 2023-11-28 RX ORDER — OMEPRAZOLE 20 MG/1
20 CAPSULE, DELAYED RELEASE ORAL DAILY
Status: ON HOLD | COMMUNITY

## 2023-11-28 RX ORDER — LEVOTHYROXINE SODIUM 112 UG/1
112 TABLET ORAL DAILY
Status: DISCONTINUED | OUTPATIENT
Start: 2023-11-29 | End: 2023-11-30 | Stop reason: HOSPADM

## 2023-11-28 RX ORDER — FLUOXETINE 10 MG/1
10 CAPSULE ORAL DAILY
Status: ON HOLD | COMMUNITY

## 2023-11-28 RX ORDER — PANTOPRAZOLE SODIUM 40 MG/1
40 TABLET, DELAYED RELEASE ORAL
Status: DISCONTINUED | OUTPATIENT
Start: 2023-11-29 | End: 2023-11-30 | Stop reason: HOSPADM

## 2023-11-28 RX ORDER — GUAIFENESIN/DEXTROMETHORPHAN 100-10MG/5
5 SYRUP ORAL EVERY 4 HOURS PRN
Status: DISCONTINUED | OUTPATIENT
Start: 2023-11-28 | End: 2023-11-30 | Stop reason: HOSPADM

## 2023-11-28 RX ORDER — LEVALBUTEROL INHALATION SOLUTION 1.25 MG/3ML
1.25 SOLUTION RESPIRATORY (INHALATION) ONCE
Status: COMPLETED | OUTPATIENT
Start: 2023-11-28 | End: 2023-11-28

## 2023-11-28 RX ORDER — INSULIN LISPRO 100 [IU]/ML
0-4 INJECTION, SOLUTION INTRAVENOUS; SUBCUTANEOUS
Status: DISCONTINUED | OUTPATIENT
Start: 2023-11-29 | End: 2023-11-30 | Stop reason: HOSPADM

## 2023-11-28 RX ORDER — POLYETHYLENE GLYCOL 3350 17 G/17G
17 POWDER, FOR SOLUTION ORAL DAILY PRN
Status: DISCONTINUED | OUTPATIENT
Start: 2023-11-28 | End: 2023-11-30 | Stop reason: HOSPADM

## 2023-11-28 RX ORDER — CELECOXIB 100 MG/1
200 CAPSULE ORAL DAILY
Status: DISCONTINUED | OUTPATIENT
Start: 2023-11-29 | End: 2023-11-30 | Stop reason: HOSPADM

## 2023-11-28 RX ORDER — IPRATROPIUM BROMIDE AND ALBUTEROL SULFATE 2.5; .5 MG/3ML; MG/3ML
1 SOLUTION RESPIRATORY (INHALATION)
Status: DISCONTINUED | OUTPATIENT
Start: 2023-11-29 | End: 2023-11-30 | Stop reason: HOSPADM

## 2023-11-28 RX ORDER — GLUCAGON 1 MG/ML
1 KIT INJECTION PRN
Status: DISCONTINUED | OUTPATIENT
Start: 2023-11-28 | End: 2023-11-30 | Stop reason: HOSPADM

## 2023-11-28 RX ORDER — INSULIN LISPRO 100 [IU]/ML
0-4 INJECTION, SOLUTION INTRAVENOUS; SUBCUTANEOUS NIGHTLY
Status: DISCONTINUED | OUTPATIENT
Start: 2023-11-28 | End: 2023-11-30 | Stop reason: HOSPADM

## 2023-11-28 RX ORDER — LOSARTAN POTASSIUM 25 MG/1
25 TABLET ORAL DAILY
Status: ON HOLD | COMMUNITY

## 2023-11-28 RX ORDER — SODIUM CHLORIDE 9 MG/ML
INJECTION, SOLUTION INTRAVENOUS PRN
Status: DISCONTINUED | OUTPATIENT
Start: 2023-11-28 | End: 2023-11-30 | Stop reason: HOSPADM

## 2023-11-28 RX ORDER — ACETAMINOPHEN 650 MG/1
650 SUPPOSITORY RECTAL EVERY 6 HOURS PRN
Status: DISCONTINUED | OUTPATIENT
Start: 2023-11-28 | End: 2023-11-30 | Stop reason: HOSPADM

## 2023-11-28 RX ORDER — ACETAMINOPHEN 325 MG/1
650 TABLET ORAL EVERY 6 HOURS PRN
Status: DISCONTINUED | OUTPATIENT
Start: 2023-11-28 | End: 2023-11-30 | Stop reason: HOSPADM

## 2023-11-28 RX ORDER — SODIUM CHLORIDE 9 MG/ML
INJECTION, SOLUTION INTRAVENOUS CONTINUOUS
Status: DISCONTINUED | OUTPATIENT
Start: 2023-11-28 | End: 2023-11-30

## 2023-11-28 RX ORDER — ONDANSETRON 2 MG/ML
4 INJECTION INTRAMUSCULAR; INTRAVENOUS EVERY 6 HOURS PRN
Status: DISCONTINUED | OUTPATIENT
Start: 2023-11-28 | End: 2023-11-30 | Stop reason: HOSPADM

## 2023-11-28 RX ORDER — FLUOXETINE 10 MG/1
10 CAPSULE ORAL DAILY
Status: DISCONTINUED | OUTPATIENT
Start: 2023-11-29 | End: 2023-11-30 | Stop reason: HOSPADM

## 2023-11-28 RX ORDER — SODIUM CHLORIDE 0.9 % (FLUSH) 0.9 %
5-40 SYRINGE (ML) INJECTION EVERY 12 HOURS SCHEDULED
Status: DISCONTINUED | OUTPATIENT
Start: 2023-11-28 | End: 2023-11-30 | Stop reason: HOSPADM

## 2023-11-28 RX ORDER — IPRATROPIUM BROMIDE AND ALBUTEROL SULFATE 2.5; .5 MG/3ML; MG/3ML
1 SOLUTION RESPIRATORY (INHALATION)
Status: DISCONTINUED | OUTPATIENT
Start: 2023-11-29 | End: 2023-11-28

## 2023-11-28 RX ORDER — LACTOBACILLUS RHAMNOSUS GG 10B CELL
1 CAPSULE ORAL
Status: DISCONTINUED | OUTPATIENT
Start: 2023-11-29 | End: 2023-11-30 | Stop reason: HOSPADM

## 2023-11-28 RX ORDER — BENZONATATE 100 MG/1
100 CAPSULE ORAL 3 TIMES DAILY PRN
Status: DISCONTINUED | OUTPATIENT
Start: 2023-11-28 | End: 2023-11-30 | Stop reason: HOSPADM

## 2023-11-28 RX ADMIN — SODIUM CHLORIDE: 9 INJECTION, SOLUTION INTRAVENOUS at 21:31

## 2023-11-28 RX ADMIN — IPRATROPIUM BROMIDE 0.5 MG: 0.5 SOLUTION RESPIRATORY (INHALATION) at 15:42

## 2023-11-28 RX ADMIN — SODIUM CHLORIDE, PRESERVATIVE FREE 10 ML: 5 INJECTION INTRAVENOUS at 21:29

## 2023-11-28 RX ADMIN — Medication 3 ML: at 15:42

## 2023-11-28 RX ADMIN — PIPERACILLIN AND TAZOBACTAM 3375 MG: 3; .375 INJECTION, POWDER, FOR SOLUTION INTRAVENOUS at 19:00

## 2023-11-28 RX ADMIN — LEVALBUTEROL HYDROCHLORIDE 1.25 MG: 1.25 SOLUTION RESPIRATORY (INHALATION) at 15:45

## 2023-11-28 RX ADMIN — METHYLPREDNISOLONE SODIUM SUCCINATE 40 MG: 40 INJECTION, POWDER, FOR SOLUTION INTRAMUSCULAR; INTRAVENOUS at 21:29

## 2023-11-28 RX ADMIN — IOPAMIDOL 75 ML: 755 INJECTION, SOLUTION INTRAVENOUS at 17:39

## 2023-11-28 ASSESSMENT — LIFESTYLE VARIABLES
HOW MANY STANDARD DRINKS CONTAINING ALCOHOL DO YOU HAVE ON A TYPICAL DAY: PATIENT DOES NOT DRINK
HOW OFTEN DO YOU HAVE A DRINK CONTAINING ALCOHOL: NEVER
HOW OFTEN DO YOU HAVE A DRINK CONTAINING ALCOHOL: NEVER
HOW MANY STANDARD DRINKS CONTAINING ALCOHOL DO YOU HAVE ON A TYPICAL DAY: PATIENT DOES NOT DRINK

## 2023-11-28 ASSESSMENT — PAIN - FUNCTIONAL ASSESSMENT: PAIN_FUNCTIONAL_ASSESSMENT: NONE - DENIES PAIN

## 2023-11-28 NOTE — ED NOTES
Ticket to ride completed.  The following information was reported off:  Name  Allergies  Orientation Level  Destination  Safety Issues  Code Status  Oxygen Requirements  Special needs including mobility, language, communication       Alexa Lainez RN  11/28/23 4820

## 2023-11-29 PROBLEM — E44.0 MODERATE MALNUTRITION (HCC): Status: RESOLVED | Noted: 2022-01-12 | Resolved: 2023-11-29

## 2023-11-29 PROBLEM — E43 SEVERE MALNUTRITION (HCC): Status: ACTIVE | Noted: 2023-11-29

## 2023-11-29 LAB
ANION GAP SERPL CALCULATED.3IONS-SCNC: 6 MMOL/L (ref 9–17)
BASOPHILS # BLD: ABNORMAL K/UL (ref 0–0.2)
BASOPHILS NFR BLD: ABNORMAL % (ref 0–2)
BUN SERPL-MCNC: 21 MG/DL (ref 8–23)
BUN/CREAT SERPL: 42 (ref 9–20)
CALCIUM SERPL-MCNC: 9.2 MG/DL (ref 8.6–10.4)
CHLORIDE SERPL-SCNC: 94 MMOL/L (ref 98–107)
CO2 SERPL-SCNC: 34 MMOL/L (ref 20–31)
CREAT SERPL-MCNC: 0.5 MG/DL (ref 0.7–1.2)
EOSINOPHIL # BLD: ABNORMAL K/UL (ref 0–0.4)
EOSINOPHILS RELATIVE PERCENT: ABNORMAL % (ref 0–5)
ERYTHROCYTE [DISTWIDTH] IN BLOOD BY AUTOMATED COUNT: 14 % (ref 12.1–15.2)
EST. AVERAGE GLUCOSE BLD GHB EST-MCNC: 100 MG/DL
GFR SERPL CREATININE-BSD FRML MDRD: >60 ML/MIN/1.73M2
GLUCOSE BLD-MCNC: 212 MG/DL (ref 65–99)
GLUCOSE BLD-MCNC: 237 MG/DL (ref 65–99)
GLUCOSE BLD-MCNC: 242 MG/DL (ref 65–99)
GLUCOSE BLD-MCNC: 250 MG/DL (ref 65–99)
GLUCOSE SERPL-MCNC: 207 MG/DL (ref 70–99)
HBA1C MFR BLD: 5.1 % (ref 4–6)
HCT VFR BLD AUTO: 28.6 % (ref 41–53)
HGB BLD-MCNC: 8.9 G/DL (ref 13.5–17.5)
IMM GRANULOCYTES # BLD AUTO: ABNORMAL K/UL (ref 0–0.3)
IMM GRANULOCYTES NFR BLD: ABNORMAL %
LYMPHOCYTES NFR BLD: 0.49 K/UL (ref 1–4.8)
LYMPHOCYTES RELATIVE PERCENT: 5 % (ref 13–44)
MCH RBC QN AUTO: 25.4 PG (ref 26–34)
MCHC RBC AUTO-ENTMCNC: 31.1 G/DL (ref 31–37)
MCV RBC AUTO: 81.7 FL (ref 80–100)
MONOCYTES NFR BLD: 0.2 K/UL (ref 0–1)
MONOCYTES NFR BLD: 2 % (ref 5–9)
MORPHOLOGY: ABNORMAL
NEUTROPHILS NFR BLD: 93 % (ref 39–75)
NEUTS SEG NFR BLD: 9.11 K/UL (ref 2.1–6.5)
PLATELET # BLD AUTO: 288 K/UL (ref 140–450)
PMV BLD AUTO: 9.5 FL (ref 6–12)
POTASSIUM SERPL-SCNC: 4.3 MMOL/L (ref 3.7–5.3)
RBC # BLD AUTO: 3.5 M/UL (ref 4.5–5.9)
SODIUM SERPL-SCNC: 134 MMOL/L (ref 135–144)
TSH SERPL DL<=0.05 MIU/L-ACNC: 0.37 UIU/ML (ref 0.3–5)
WBC OTHER # BLD: 9.8 K/UL (ref 3.5–11)

## 2023-11-29 PROCEDURE — 82947 ASSAY GLUCOSE BLOOD QUANT: CPT

## 2023-11-29 PROCEDURE — 6370000000 HC RX 637 (ALT 250 FOR IP): Performed by: INTERNAL MEDICINE

## 2023-11-29 PROCEDURE — 2580000003 HC RX 258: Performed by: INTERNAL MEDICINE

## 2023-11-29 PROCEDURE — 6360000002 HC RX W HCPCS: Performed by: INTERNAL MEDICINE

## 2023-11-29 PROCEDURE — 94761 N-INVAS EAR/PLS OXIMETRY MLT: CPT

## 2023-11-29 PROCEDURE — 36415 COLL VENOUS BLD VENIPUNCTURE: CPT

## 2023-11-29 PROCEDURE — 85025 COMPLETE CBC W/AUTO DIFF WBC: CPT

## 2023-11-29 PROCEDURE — 2700000000 HC OXYGEN THERAPY PER DAY

## 2023-11-29 PROCEDURE — 80048 BASIC METABOLIC PNL TOTAL CA: CPT

## 2023-11-29 PROCEDURE — 97166 OT EVAL MOD COMPLEX 45 MIN: CPT

## 2023-11-29 PROCEDURE — 1200000000 HC SEMI PRIVATE

## 2023-11-29 PROCEDURE — 94640 AIRWAY INHALATION TREATMENT: CPT

## 2023-11-29 PROCEDURE — 97162 PT EVAL MOD COMPLEX 30 MIN: CPT

## 2023-11-29 PROCEDURE — 84443 ASSAY THYROID STIM HORMONE: CPT

## 2023-11-29 RX ORDER — ALBUTEROL SULFATE 2.5 MG/3ML
2.5 SOLUTION RESPIRATORY (INHALATION)
Status: DISCONTINUED | OUTPATIENT
Start: 2023-11-29 | End: 2023-11-30 | Stop reason: HOSPADM

## 2023-11-29 RX ADMIN — SODIUM CHLORIDE: 9 INJECTION, SOLUTION INTRAVENOUS at 10:32

## 2023-11-29 RX ADMIN — IPRATROPIUM BROMIDE AND ALBUTEROL SULFATE 1 DOSE: .5; 3 SOLUTION RESPIRATORY (INHALATION) at 16:37

## 2023-11-29 RX ADMIN — IPRATROPIUM BROMIDE AND ALBUTEROL SULFATE 1 DOSE: .5; 3 SOLUTION RESPIRATORY (INHALATION) at 05:28

## 2023-11-29 RX ADMIN — ENOXAPARIN SODIUM 40 MG: 100 INJECTION SUBCUTANEOUS at 07:46

## 2023-11-29 RX ADMIN — IPRATROPIUM BROMIDE AND ALBUTEROL SULFATE 1 DOSE: .5; 3 SOLUTION RESPIRATORY (INHALATION) at 13:08

## 2023-11-29 RX ADMIN — PIPERACILLIN AND TAZOBACTAM 3375 MG: 3; .375 INJECTION, POWDER, LYOPHILIZED, FOR SOLUTION INTRAVENOUS at 07:49

## 2023-11-29 RX ADMIN — METHYLPREDNISOLONE SODIUM SUCCINATE 40 MG: 40 INJECTION, POWDER, FOR SOLUTION INTRAMUSCULAR; INTRAVENOUS at 12:16

## 2023-11-29 RX ADMIN — LEVOTHYROXINE SODIUM 112 MCG: 0.11 TABLET ORAL at 05:42

## 2023-11-29 RX ADMIN — Medication 1 CAPSULE: at 07:44

## 2023-11-29 RX ADMIN — METHYLPREDNISOLONE SODIUM SUCCINATE 40 MG: 40 INJECTION, POWDER, FOR SOLUTION INTRAMUSCULAR; INTRAVENOUS at 20:17

## 2023-11-29 RX ADMIN — ALBUTEROL SULFATE 2.5 MG: 2.5 SOLUTION RESPIRATORY (INHALATION) at 21:01

## 2023-11-29 RX ADMIN — INSULIN LISPRO 1 UNITS: 100 INJECTION, SOLUTION INTRAVENOUS; SUBCUTANEOUS at 17:14

## 2023-11-29 RX ADMIN — INSULIN LISPRO 2 UNITS: 100 INJECTION, SOLUTION INTRAVENOUS; SUBCUTANEOUS at 12:16

## 2023-11-29 RX ADMIN — ROSUVASTATIN 10 MG: 10 TABLET, FILM COATED ORAL at 07:44

## 2023-11-29 RX ADMIN — METHYLPREDNISOLONE SODIUM SUCCINATE 40 MG: 40 INJECTION, POWDER, FOR SOLUTION INTRAMUSCULAR; INTRAVENOUS at 04:32

## 2023-11-29 RX ADMIN — PANTOPRAZOLE SODIUM 40 MG: 40 TABLET, DELAYED RELEASE ORAL at 05:42

## 2023-11-29 RX ADMIN — SODIUM CHLORIDE, PRESERVATIVE FREE 10 ML: 5 INJECTION INTRAVENOUS at 20:17

## 2023-11-29 RX ADMIN — CELECOXIB 200 MG: 100 CAPSULE ORAL at 07:44

## 2023-11-29 RX ADMIN — PIPERACILLIN AND TAZOBACTAM 3375 MG: 3; .375 INJECTION, POWDER, LYOPHILIZED, FOR SOLUTION INTRAVENOUS at 00:32

## 2023-11-29 RX ADMIN — IPRATROPIUM BROMIDE AND ALBUTEROL SULFATE 1 DOSE: .5; 3 SOLUTION RESPIRATORY (INHALATION) at 09:34

## 2023-11-29 RX ADMIN — SODIUM CHLORIDE, PRESERVATIVE FREE 10 ML: 5 INJECTION INTRAVENOUS at 07:45

## 2023-11-29 RX ADMIN — PIPERACILLIN AND TAZOBACTAM 3375 MG: 3; .375 INJECTION, POWDER, LYOPHILIZED, FOR SOLUTION INTRAVENOUS at 17:24

## 2023-11-29 RX ADMIN — INSULIN LISPRO 1 UNITS: 100 INJECTION, SOLUTION INTRAVENOUS; SUBCUTANEOUS at 07:45

## 2023-11-29 RX ADMIN — FLUOXETINE 10 MG: 10 CAPSULE ORAL at 07:44

## 2023-11-29 ASSESSMENT — PAIN SCALES - GENERAL
PAINLEVEL_OUTOF10: 0
PAINLEVEL_OUTOF10: 0

## 2023-11-29 ASSESSMENT — PAIN - FUNCTIONAL ASSESSMENT
PAIN_FUNCTIONAL_ASSESSMENT: ACTIVITIES ARE NOT PREVENTED
PAIN_FUNCTIONAL_ASSESSMENT: NONE - DENIES PAIN

## 2023-11-29 NOTE — H&P
History & Physical    Patient:  Yael Merino  YOB: 1945  Date of Service: 11/29/2023  MRN: [id number]   Acct:   [de-identified]   Primary Care Physician: Adrianne Reid MD    Chief Complaint:   Chief Complaint   Patient presents with    Tachycardia     Patient has been \"down\" and not \"himself\" for the past two weeks, according to family. Patient seen at pcp today and wanted a UA, PCP sent patient to ER. Family states they said his HR was elevated but he had just walked to the Room. History of Present Illness:     History obtained from chart review and granddaughter Rashid. The patient is a 70 y.o. male with history of advanced COPD with chronic hypoxemic hypercapnic respiratory failure which, history of diabetes, sepsis due to RSV pneumonia, moderate malnutrition, pulmonary nodules, tobacco abuse was brought to the emergency room by family members for evaluation of elevated heart rate, decreased appetite and some confusion. Patient is somewhat confused this morning and not able to provide me with history. His granddaughter Rashid came to visit him and told me that the patient used to live by himself independently up until about 2 weeks ago when he started declining mentally and physically. Granddaughter had him move in with her. She noticed that the patient has been sleeping a lot, not eating, getting more and more confused. Patient did not complain of any pains, shortness of breath, he did not have increased from baseline cough. Patient is on continuous 5 L of oxygen for the past 2 years due to advanced COPD. Apparently was still smoking more than a pack a day. Yesterday he had an appointment with his primary care physician in [address] [place] [address] and apparently was tachycardic in office and for this reason was advised to be taken to the emergency room for evaluation. Work-up in the emergency room revealed temperature 98, respirations 26, pulse 95, blood pressure 107/69.   Patient was 99% on 5 L

## 2023-11-29 NOTE — CARE COORDINATION
Case Management Assessment  Initial Evaluation    Date/Time of Evaluation: 11/29/2023 11:18 AM  Assessment Completed by: David Crowley RN    If patient is discharged prior to next notation, then this note serves as note for discharge by case management. Patient Name: Yael Merino                   YOB: 1945  Diagnosis: History of COPD [Z87.09]  CAP (community acquired pneumonia) due to Chlamydia species [J16.0]  Community acquired pneumonia of left lung, unspecified part of lung [J18.9]                   Date / Time: 11/28/2023  3:11 PM    Patient Admission Status: Inpatient   Readmission Risk (Low < 19, Mod (19-27), High > 27): Readmission Risk Score: 15.2    Current PCP: Adrianne Reid MD  PCP verified by CM? (P) Yes ([facility] [place] and [facility] Alexa Barakat at [facility])    Chart Reviewed: Yes      History Provided by: (P) Child/Family (granddaughter- Ela Rummage)  Patient Orientation: (P) Alert and Oriented, Person, Place, Situation, Self    Patient Cognition: (P) Alert    Hospitalization in the last 30 days (Readmission):  No    If yes, Readmission Assessment in  Navigator will be completed.     Advance Directives:      Code Status: Full Code   Patient's Primary Decision Maker is: (P) Legal Next of Kin      Discharge Planning:    Patient lives with: (P) Family Members Type of Home: (P) House  Primary Care Giver:    Patient Support Systems include: Family Members   Current Financial resources: (P) [place] Medicare  Current community resources: (P) None  Current services prior to admission: (P) None            Current DME:              Type of Home Care services:  (P) None    ADLS  Prior functional level: (P) Assistance with the following:, Bathing, Shopping, Housework, Cooking  Current functional level: (P) Assistance with the following:, Bathing, Shopping, Cooking, Housework    PT AM-PAC:   /24  OT AM-PAC:   /24    Family can provide assistance at DC: (P) Yes  Would you like Case Management

## 2023-11-29 NOTE — ED PROVIDER NOTES
radiology report, discussed at this time pneumonia the possibility of neoplasm remains given patient's risk factors, discussed with a treatment patient at this time for IV antibiotics, acknowledged    Case was discussed with Dr. Princess Rodriguez, hospitalist, regarding patient's mentation current workup, does asked to switch to IV Zosyn instead of current antibiotics, and orders changed as requested. Advised patient and family of acceptance to floor, acknowledged        1)  Number and Complexity of Problems  Problem List This Visit: As above    Differential Diagnosis: Pneumonia bronchitis URI PTX GI bleed anemia NOS    Diagnoses Considered but Do Not Suspect: N/A    Pertinent Comorbid Conditions: N/A    2)  Data Reviewed  My EKG interpretation:  As above    Decision Rules/Scores utilized: N/A    Tests considered but not ordered and why: N/A    External Documents Reviewed: N/A    Imaging that is independently reviewed and interpreted by me as: N/A    See more data below for the lab and radiology tests and orders. 3)  Treatment and Disposition    Patient repeat assessment:  As above    Disposition discussion with patient/family: Admit    Case discussed with consulting clinician:  As above    Social determinants of health impacting treatment or disposition: N/A    Shared Decision Making: N/A    Code Status Discussion: N/A      REASSESSMENT     As above      CRITICAL CARE TIME     Total Critical Care time was 0 minutes, excluding separately reportable procedures. There was a high probability of clinically significant/life threatening deterioration in the patient's condition which required my urgent intervention. PROCEDURES:  Unless otherwise noted below, none     Procedures    FINAL IMPRESSION      1. Community acquired pneumonia of left lung, unspecified part of lung    2. History of COPD    3.  Positive fecal occult blood test          DISPOSITION/PLAN     DISPOSITION Admitted 11/28/2023 07:07:23 PM      PATIENT

## 2023-11-29 NOTE — ACP (ADVANCE CARE PLANNING)
Advance Care Planning     Advance Care Planning Activator (Inpatient)  Conversation Note      Date of ACP Conversation: 11/29/2023     Conversation Conducted with: Patient with [address] Decision Maker: Next of Kin by law (only applies in absence of above) (name) Pts dtr and granddtr Jewel Bell that are present. ACP Activator: FRANCISCO Jacob    {When Decision Maker makes decisions on behalf of the incapacitated patient: Decision Maker is asked to consider and make decisions based on patient values, known preferences, or best interests. Health Care Decision Maker: Family uncertain if pt has advance directives and thinks if he does have a DPOA that it would be his dtr Aimee Elmore. Consider next of kin with children conses=nsus if no documents available. Current Designated Health Care Decision Maker:     Click here to complete Healthcare Decision Makers including section of the Healthcare Decision Maker Relationship (ie \"Primary\")  Today we documented Decision Maker(s) consistent with Legal Next of Kin hierarchy. Care Preferences    Ventilation: \"If you were in your present state of health and suddenly became very ill and were unable to breathe on your own, what would your preference be about the use of a ventilator (breathing machine) if it were available to you? \"      Would the patient desire the use of ventilator (breathing machine)?: yes    \"If your health worsens and it becomes clear that your chance of recovery is unlikely, what would your preference be about the use of a ventilator (breathing machine) if it were available to you? \"     Would the patient desire the use of ventilator (breathing machine)?: No \"if there is no brain activity, yes until then\"      Resuscitation  \"CPR works best to restart the heart when there is a sudden event, like a heart attack, in someone who is otherwise healthy.  Unfortunately, CPR does not typically restart the heart

## 2023-11-29 NOTE — THERAPY EVALUATION
[facility]  Physical Therapy  Evaluation  Date: 2023  Patient Name: Gema Martini        MRN: [id number]    : 1945  (70 y.o.)  Gender: male   Referring Practitioner: Dr. Dixon Kauffman  Diagnosis: Pneumonia  Additional Pertinent Hx: Patient to ED on 23 from Dr. office due to pneumonia and hx of COPD.    Past Medical History:   Diagnosis Date    Brain tumor (benign) ([address])     COPD (chronic obstructive pulmonary disease) (HCC)     Diabetes mellitus (HCC)      Past Surgical History:   Procedure Laterality Date    BRAIN SURGERY         Restrictions  Restrictions/Precautions: Fall Risk, General Precautions      Subjective         Pain Level: 0    Orientation       Home Living / Prior Level of Function  Social/Functional History  Lives With: Family (Granddaughter)  Type of Home: House  Home Layout: One level  Home Access: Stairs to enter with rails  Entrance Stairs - Number of Steps: 1  Entrance Stairs - Rails: Left  Bathroom Shower/Tub: Tub/Shower unit  Bathroom Toilet: Standard  Bathroom Equipment: Shower chair  Home Equipment: Ritta Duff, rolling, Oxygen  Receives Help From: Family  ADL Assistance: Needs assistance  Homemaking Assistance: Needs assistance  Ambulation Assistance: Independent (with w.walker)  Transfer Assistance: Needs assistance  Active : No  Patient's  Info: family provides  Mode of Transportation: Car  Occupation: Retired,   Additional Comments: Per granddaughter, patient was needing a little help to get out of bed, but could then walk with w.walker byself for short distances    Objective                       Strength RLE  Strength RLE: Exception  R Hip Flexion: 3+/5  R Knee Extension: 4-/5  R Ankle Dorsiflexion: 4-/5  Strength LLE  Strength LLE: Exception  L Hip Flexion: 3+/5  L Knee Extension: 4-/5  L Ankle Dorsiflexion: 4-/5             Bed mobility  Sit to Supine: Minimal assistance  Supine to Sit: Minimal assistance  Sit to Supine: Minimal assistance  Sit to

## 2023-11-29 NOTE — THERAPY EVALUATION
[facility]  Phone: [phone]    Occupational Therapy Evaluation    Date: 2023  Patient Name: Ivelisse Spain        MRN: [id number]    : 1945  (70 y.o.)  Gender: male   Referring Practitioner: Dr. Angelica Sood  Diagnosis: Community-acquired PNA  Additional Pertinent Hx: Admitted to [facility] on 23 due to community-acquired PNA. According to the EMR, patient had an appointment with his PCP on 23 and was instructed to go to the ED secondary to tachycardia. Of note, patient recently moved in with his granddaughter due to mental/physical decline over the last two weeks. Referred to OT services to assess ability to care for self. Past Medical History:   Diagnosis Date    Brain tumor (benign) ([address])     COPD (chronic obstructive pulmonary disease) (HCC)     Diabetes mellitus ([address])      Past Surgical History:   Procedure Laterality Date    BRAIN SURGERY       Subjective:     Subjective: Patient was sitting in recliner with family and RN present upon OT arrival. Was agreeable to OT evaluation. Objective:     Social/Functional History:  Lives With:  (granddaughter)  Type of Home: House  Home Layout: One level  Home Access: Stairs to enter with rails  Bathroom Shower/Tub: Tub/Shower unit  Bathroom Toilet: Standard  Bathroom Equipment: Shower chair  Home Equipment: Janee Cowing, rolling, Oxygen    PLOF:  Receives Help From: Family  ADL Assistance: Needs assistance  Homemaking Assistance: Needs assistance  Ambulation Assistance: Needs assistance (via [employer] [address] [place])  Transfer Assistance: Needs assistance  Transfer Assistance: Needs assistance  *patient was independent with all ADLs and IADLs 3+ weeks ago.     ADLs:  Feeding: Supervision, Verbal cueing (due to cognitive deficits)  Grooming: Verbal cueing, Contact guard assistance (in standing)  UE Bathing: Stand by assistance, Verbal cueing (in sitting)  LE Bathing: Minimal assistance, Verbal cueing (sitting/standing PRN)  UE Dressing: Stand by

## 2023-11-29 NOTE — ED NOTES
All belongings sent home with family member. Report given to [facility] and this nurse made Rubi aware that patient can go upstairs to room 268 after shift report.      Lashay Lee RN  11/28/23 1913

## 2023-11-30 ENCOUNTER — HOSPITAL ENCOUNTER (INPATIENT)
Age: 78
LOS: 4 days | Discharge: HOME HEALTH | DRG: 193 | End: 2023-12-04
Attending: INTERNAL MEDICINE | Admitting: INTERNAL MEDICINE
Payer: MEDICARE

## 2023-11-30 VITALS
TEMPERATURE: 97.8 F | OXYGEN SATURATION: 96 % | DIASTOLIC BLOOD PRESSURE: 59 MMHG | WEIGHT: 126.98 LBS | SYSTOLIC BLOOD PRESSURE: 105 MMHG | HEIGHT: 71 IN | HEART RATE: 97 BPM | BODY MASS INDEX: 17.78 KG/M2 | RESPIRATION RATE: 20 BRPM

## 2023-11-30 DIAGNOSIS — R19.5 HEME POSITIVE STOOL: ICD-10-CM

## 2023-11-30 DIAGNOSIS — J44.1 COPD EXACERBATION (HCC): ICD-10-CM

## 2023-11-30 DIAGNOSIS — R91.8 LUNG MASS: ICD-10-CM

## 2023-11-30 DIAGNOSIS — R53.1 GENERALIZED WEAKNESS: Primary | ICD-10-CM

## 2023-11-30 DIAGNOSIS — D64.9 ACUTE ON CHRONIC ANEMIA: ICD-10-CM

## 2023-11-30 DIAGNOSIS — J15.9 COMMUNITY ACQUIRED BACTERIAL PNEUMONIA: ICD-10-CM

## 2023-11-30 PROBLEM — J18.9 COMMUNITY ACQUIRED PNEUMONIA OF RIGHT MIDDLE LOBE OF LUNG: Status: ACTIVE | Noted: 2023-11-30

## 2023-11-30 LAB
EKG ATRIAL RATE: 88 BPM
EKG P AXIS: 115 DEGREES
EKG P-R INTERVAL: 164 MS
EKG Q-T INTERVAL: 372 MS
EKG QRS DURATION: 94 MS
EKG QTC CALCULATION (BAZETT): 450 MS
EKG R AXIS: 81 DEGREES
EKG T AXIS: 45 DEGREES
EKG VENTRICULAR RATE: 88 BPM
GLUCOSE BLD-MCNC: 138 MG/DL (ref 65–99)
GLUCOSE BLD-MCNC: 177 MG/DL (ref 65–99)
GLUCOSE BLD-MCNC: 195 MG/DL (ref 65–99)
GLUCOSE BLD-MCNC: 239 MG/DL (ref 65–99)

## 2023-11-30 PROCEDURE — 82947 ASSAY GLUCOSE BLOOD QUANT: CPT

## 2023-11-30 PROCEDURE — 94669 MECHANICAL CHEST WALL OSCILL: CPT

## 2023-11-30 PROCEDURE — 2700000000 HC OXYGEN THERAPY PER DAY

## 2023-11-30 PROCEDURE — 2580000003 HC RX 258: Performed by: INTERNAL MEDICINE

## 2023-11-30 PROCEDURE — 6370000000 HC RX 637 (ALT 250 FOR IP): Performed by: INTERNAL MEDICINE

## 2023-11-30 PROCEDURE — 6360000002 HC RX W HCPCS: Performed by: INTERNAL MEDICINE

## 2023-11-30 PROCEDURE — 97110 THERAPEUTIC EXERCISES: CPT

## 2023-11-30 PROCEDURE — 93010 ELECTROCARDIOGRAM REPORT: CPT | Performed by: INTERNAL MEDICINE

## 2023-11-30 PROCEDURE — 97116 GAIT TRAINING THERAPY: CPT

## 2023-11-30 PROCEDURE — 1200000002 HC SEMI PRIVATE SWING BED

## 2023-11-30 PROCEDURE — 94640 AIRWAY INHALATION TREATMENT: CPT

## 2023-11-30 PROCEDURE — 94761 N-INVAS EAR/PLS OXIMETRY MLT: CPT

## 2023-11-30 RX ORDER — PREDNISONE 20 MG/1
20 TABLET ORAL DAILY
Status: COMPLETED | OUTPATIENT
Start: 2023-12-01 | End: 2023-12-02

## 2023-11-30 RX ORDER — POLYETHYLENE GLYCOL 3350 17 G/17G
17 POWDER, FOR SOLUTION ORAL DAILY PRN
Status: DISCONTINUED | OUTPATIENT
Start: 2023-11-30 | End: 2023-12-04 | Stop reason: HOSPADM

## 2023-11-30 RX ORDER — POLYETHYLENE GLYCOL 3350 17 G/17G
17 POWDER, FOR SOLUTION ORAL DAILY PRN
Status: CANCELLED | OUTPATIENT
Start: 2023-11-30

## 2023-11-30 RX ORDER — ENOXAPARIN SODIUM 100 MG/ML
40 INJECTION SUBCUTANEOUS DAILY
Status: DISCONTINUED | OUTPATIENT
Start: 2023-12-01 | End: 2023-12-04

## 2023-11-30 RX ORDER — LEVOTHYROXINE SODIUM 112 UG/1
112 TABLET ORAL DAILY
Status: DISCONTINUED | OUTPATIENT
Start: 2023-12-01 | End: 2023-12-04 | Stop reason: HOSPADM

## 2023-11-30 RX ORDER — ACETAMINOPHEN 325 MG/1
650 TABLET ORAL EVERY 6 HOURS PRN
Status: DISCONTINUED | OUTPATIENT
Start: 2023-11-30 | End: 2023-12-04 | Stop reason: HOSPADM

## 2023-11-30 RX ORDER — LACTOBACILLUS RHAMNOSUS GG 10B CELL
1 CAPSULE ORAL
Status: CANCELLED | OUTPATIENT
Start: 2023-12-01

## 2023-11-30 RX ORDER — INSULIN LISPRO 100 [IU]/ML
0-4 INJECTION, SOLUTION INTRAVENOUS; SUBCUTANEOUS
Status: CANCELLED | OUTPATIENT
Start: 2023-12-01

## 2023-11-30 RX ORDER — FLUOXETINE 10 MG/1
10 CAPSULE ORAL DAILY
Status: DISCONTINUED | OUTPATIENT
Start: 2023-12-01 | End: 2023-12-04 | Stop reason: HOSPADM

## 2023-11-30 RX ORDER — ONDANSETRON 2 MG/ML
4 INJECTION INTRAMUSCULAR; INTRAVENOUS EVERY 6 HOURS PRN
Status: DISCONTINUED | OUTPATIENT
Start: 2023-11-30 | End: 2023-12-04 | Stop reason: HOSPADM

## 2023-11-30 RX ORDER — ACETAMINOPHEN 650 MG/1
650 SUPPOSITORY RECTAL EVERY 6 HOURS PRN
Status: CANCELLED | OUTPATIENT
Start: 2023-11-30

## 2023-11-30 RX ORDER — BENZONATATE 100 MG/1
100 CAPSULE ORAL 3 TIMES DAILY PRN
Status: DISCONTINUED | OUTPATIENT
Start: 2023-11-30 | End: 2023-12-04 | Stop reason: HOSPADM

## 2023-11-30 RX ORDER — DEXTROSE MONOHYDRATE 100 MG/ML
INJECTION, SOLUTION INTRAVENOUS CONTINUOUS PRN
Status: DISCONTINUED | OUTPATIENT
Start: 2023-11-30 | End: 2023-12-04 | Stop reason: HOSPADM

## 2023-11-30 RX ORDER — ROSUVASTATIN CALCIUM 10 MG/1
10 TABLET, COATED ORAL DAILY
Status: CANCELLED | OUTPATIENT
Start: 2023-12-01

## 2023-11-30 RX ORDER — GLUCAGON 1 MG/ML
1 KIT INJECTION PRN
Status: DISCONTINUED | OUTPATIENT
Start: 2023-11-30 | End: 2023-12-04 | Stop reason: HOSPADM

## 2023-11-30 RX ORDER — SODIUM CHLORIDE 9 MG/ML
INJECTION, SOLUTION INTRAVENOUS PRN
Status: DISCONTINUED | OUTPATIENT
Start: 2023-11-30 | End: 2023-12-04 | Stop reason: HOSPADM

## 2023-11-30 RX ORDER — IPRATROPIUM BROMIDE AND ALBUTEROL SULFATE 2.5; .5 MG/3ML; MG/3ML
1 SOLUTION RESPIRATORY (INHALATION)
Status: DISCONTINUED | OUTPATIENT
Start: 2023-11-30 | End: 2023-12-04 | Stop reason: HOSPADM

## 2023-11-30 RX ORDER — INSULIN LISPRO 100 [IU]/ML
0-4 INJECTION, SOLUTION INTRAVENOUS; SUBCUTANEOUS NIGHTLY
Status: CANCELLED | OUTPATIENT
Start: 2023-11-30

## 2023-11-30 RX ORDER — PREDNISONE 20 MG/1
20 TABLET ORAL DAILY
Status: CANCELLED | OUTPATIENT
Start: 2023-12-01 | End: 2023-12-03

## 2023-11-30 RX ORDER — SODIUM CHLORIDE 0.9 % (FLUSH) 0.9 %
5-40 SYRINGE (ML) INJECTION PRN
Status: CANCELLED | OUTPATIENT
Start: 2023-11-30

## 2023-11-30 RX ORDER — PREDNISONE 20 MG/1
20 TABLET ORAL DAILY
Status: DISCONTINUED | OUTPATIENT
Start: 2023-11-30 | End: 2023-11-30 | Stop reason: HOSPADM

## 2023-11-30 RX ORDER — GLUCAGON 1 MG/ML
1 KIT INJECTION PRN
Status: CANCELLED | OUTPATIENT
Start: 2023-11-30

## 2023-11-30 RX ORDER — CELECOXIB 100 MG/1
200 CAPSULE ORAL DAILY
Status: CANCELLED | OUTPATIENT
Start: 2023-12-01

## 2023-11-30 RX ORDER — CELECOXIB 100 MG/1
200 CAPSULE ORAL DAILY
Status: DISCONTINUED | OUTPATIENT
Start: 2023-12-01 | End: 2023-12-04 | Stop reason: HOSPADM

## 2023-11-30 RX ORDER — SODIUM CHLORIDE 9 MG/ML
INJECTION, SOLUTION INTRAVENOUS PRN
Status: CANCELLED | OUTPATIENT
Start: 2023-11-30

## 2023-11-30 RX ORDER — INSULIN LISPRO 100 [IU]/ML
0-4 INJECTION, SOLUTION INTRAVENOUS; SUBCUTANEOUS NIGHTLY
Status: DISCONTINUED | OUTPATIENT
Start: 2023-11-30 | End: 2023-12-04 | Stop reason: HOSPADM

## 2023-11-30 RX ORDER — BENZONATATE 100 MG/1
100 CAPSULE ORAL 3 TIMES DAILY PRN
Status: CANCELLED | OUTPATIENT
Start: 2023-11-30

## 2023-11-30 RX ORDER — SODIUM CHLORIDE FOR INHALATION 0.9 %
3 VIAL, NEBULIZER (ML) INHALATION EVERY 8 HOURS PRN
Status: CANCELLED | OUTPATIENT
Start: 2023-11-30

## 2023-11-30 RX ORDER — SODIUM CHLORIDE FOR INHALATION 0.9 %
3 VIAL, NEBULIZER (ML) INHALATION EVERY 8 HOURS PRN
Status: DISCONTINUED | OUTPATIENT
Start: 2023-11-30 | End: 2023-12-03

## 2023-11-30 RX ORDER — ACETAMINOPHEN 650 MG/1
650 SUPPOSITORY RECTAL EVERY 6 HOURS PRN
Status: DISCONTINUED | OUTPATIENT
Start: 2023-11-30 | End: 2023-12-04 | Stop reason: HOSPADM

## 2023-11-30 RX ORDER — PANTOPRAZOLE SODIUM 40 MG/1
40 TABLET, DELAYED RELEASE ORAL
Status: CANCELLED | OUTPATIENT
Start: 2023-12-01

## 2023-11-30 RX ORDER — GUAIFENESIN/DEXTROMETHORPHAN 100-10MG/5
5 SYRUP ORAL EVERY 4 HOURS PRN
Status: CANCELLED | OUTPATIENT
Start: 2023-11-30

## 2023-11-30 RX ORDER — DEXTROSE MONOHYDRATE 100 MG/ML
INJECTION, SOLUTION INTRAVENOUS CONTINUOUS PRN
Status: CANCELLED | OUTPATIENT
Start: 2023-11-30

## 2023-11-30 RX ORDER — SODIUM CHLORIDE 0.9 % (FLUSH) 0.9 %
5-40 SYRINGE (ML) INJECTION PRN
Status: DISCONTINUED | OUTPATIENT
Start: 2023-11-30 | End: 2023-12-04 | Stop reason: HOSPADM

## 2023-11-30 RX ORDER — SODIUM CHLORIDE 0.9 % (FLUSH) 0.9 %
5-40 SYRINGE (ML) INJECTION EVERY 12 HOURS SCHEDULED
Status: CANCELLED | OUTPATIENT
Start: 2023-11-30

## 2023-11-30 RX ORDER — ALBUTEROL SULFATE 2.5 MG/3ML
2.5 SOLUTION RESPIRATORY (INHALATION)
Status: CANCELLED | OUTPATIENT
Start: 2023-11-30

## 2023-11-30 RX ORDER — ENOXAPARIN SODIUM 100 MG/ML
40 INJECTION SUBCUTANEOUS DAILY
Status: CANCELLED | OUTPATIENT
Start: 2023-12-01

## 2023-11-30 RX ORDER — IPRATROPIUM BROMIDE AND ALBUTEROL SULFATE 2.5; .5 MG/3ML; MG/3ML
1 SOLUTION RESPIRATORY (INHALATION)
Status: CANCELLED | OUTPATIENT
Start: 2023-11-30

## 2023-11-30 RX ORDER — LEVOTHYROXINE SODIUM 112 UG/1
112 TABLET ORAL DAILY
Status: CANCELLED | OUTPATIENT
Start: 2023-12-01

## 2023-11-30 RX ORDER — LACTOBACILLUS RHAMNOSUS GG 10B CELL
1 CAPSULE ORAL
Status: DISCONTINUED | OUTPATIENT
Start: 2023-12-01 | End: 2023-12-04 | Stop reason: HOSPADM

## 2023-11-30 RX ORDER — INSULIN LISPRO 100 [IU]/ML
0-4 INJECTION, SOLUTION INTRAVENOUS; SUBCUTANEOUS
Status: DISCONTINUED | OUTPATIENT
Start: 2023-12-01 | End: 2023-12-04 | Stop reason: HOSPADM

## 2023-11-30 RX ORDER — ONDANSETRON 4 MG/1
4 TABLET, ORALLY DISINTEGRATING ORAL EVERY 8 HOURS PRN
Status: CANCELLED | OUTPATIENT
Start: 2023-11-30

## 2023-11-30 RX ORDER — GUAIFENESIN/DEXTROMETHORPHAN 100-10MG/5
5 SYRUP ORAL EVERY 4 HOURS PRN
Status: DISCONTINUED | OUTPATIENT
Start: 2023-11-30 | End: 2023-12-04 | Stop reason: HOSPADM

## 2023-11-30 RX ORDER — FLUOXETINE 10 MG/1
10 CAPSULE ORAL DAILY
Status: CANCELLED | OUTPATIENT
Start: 2023-12-01

## 2023-11-30 RX ORDER — ONDANSETRON 4 MG/1
4 TABLET, ORALLY DISINTEGRATING ORAL EVERY 8 HOURS PRN
Status: DISCONTINUED | OUTPATIENT
Start: 2023-11-30 | End: 2023-12-04 | Stop reason: HOSPADM

## 2023-11-30 RX ORDER — ALBUTEROL SULFATE 2.5 MG/3ML
2.5 SOLUTION RESPIRATORY (INHALATION)
Status: DISCONTINUED | OUTPATIENT
Start: 2023-11-30 | End: 2023-12-04 | Stop reason: HOSPADM

## 2023-11-30 RX ORDER — PANTOPRAZOLE SODIUM 40 MG/1
40 TABLET, DELAYED RELEASE ORAL
Status: DISCONTINUED | OUTPATIENT
Start: 2023-12-01 | End: 2023-12-04 | Stop reason: HOSPADM

## 2023-11-30 RX ORDER — SODIUM CHLORIDE 0.9 % (FLUSH) 0.9 %
5-40 SYRINGE (ML) INJECTION EVERY 12 HOURS SCHEDULED
Status: DISCONTINUED | OUTPATIENT
Start: 2023-11-30 | End: 2023-12-04 | Stop reason: HOSPADM

## 2023-11-30 RX ORDER — ACETAMINOPHEN 325 MG/1
650 TABLET ORAL EVERY 6 HOURS PRN
Status: CANCELLED | OUTPATIENT
Start: 2023-11-30

## 2023-11-30 RX ORDER — ONDANSETRON 2 MG/ML
4 INJECTION INTRAMUSCULAR; INTRAVENOUS EVERY 6 HOURS PRN
Status: CANCELLED | OUTPATIENT
Start: 2023-11-30

## 2023-11-30 RX ORDER — ROSUVASTATIN CALCIUM 10 MG/1
10 TABLET, COATED ORAL DAILY
Status: DISCONTINUED | OUTPATIENT
Start: 2023-12-01 | End: 2023-12-04 | Stop reason: HOSPADM

## 2023-11-30 RX ADMIN — SODIUM CHLORIDE, PRESERVATIVE FREE 10 ML: 5 INJECTION INTRAVENOUS at 19:56

## 2023-11-30 RX ADMIN — IPRATROPIUM BROMIDE AND ALBUTEROL SULFATE 1 DOSE: .5; 3 SOLUTION RESPIRATORY (INHALATION) at 12:58

## 2023-11-30 RX ADMIN — CELECOXIB 200 MG: 100 CAPSULE ORAL at 09:13

## 2023-11-30 RX ADMIN — PANTOPRAZOLE SODIUM 40 MG: 40 TABLET, DELAYED RELEASE ORAL at 05:16

## 2023-11-30 RX ADMIN — IPRATROPIUM BROMIDE AND ALBUTEROL SULFATE 1 DOSE: .5; 3 SOLUTION RESPIRATORY (INHALATION) at 09:18

## 2023-11-30 RX ADMIN — PIPERACILLIN AND TAZOBACTAM 3375 MG: 3; .375 INJECTION, POWDER, LYOPHILIZED, FOR SOLUTION INTRAVENOUS at 17:57

## 2023-11-30 RX ADMIN — ALBUTEROL SULFATE 2.5 MG: 2.5 SOLUTION RESPIRATORY (INHALATION) at 05:09

## 2023-11-30 RX ADMIN — SODIUM CHLORIDE: 9 INJECTION, SOLUTION INTRAVENOUS at 01:10

## 2023-11-30 RX ADMIN — PIPERACILLIN AND TAZOBACTAM 3375 MG: 3; .375 INJECTION, POWDER, LYOPHILIZED, FOR SOLUTION INTRAVENOUS at 09:17

## 2023-11-30 RX ADMIN — Medication 1 CAPSULE: at 09:13

## 2023-11-30 RX ADMIN — SODIUM CHLORIDE, PRESERVATIVE FREE 10 ML: 5 INJECTION INTRAVENOUS at 09:14

## 2023-11-30 RX ADMIN — IPRATROPIUM BROMIDE AND ALBUTEROL SULFATE 1 DOSE: .5; 3 SOLUTION RESPIRATORY (INHALATION) at 17:40

## 2023-11-30 RX ADMIN — PIPERACILLIN AND TAZOBACTAM 3375 MG: 3; .375 INJECTION, POWDER, LYOPHILIZED, FOR SOLUTION INTRAVENOUS at 01:12

## 2023-11-30 RX ADMIN — PREDNISONE 20 MG: 20 TABLET ORAL at 09:16

## 2023-11-30 RX ADMIN — FLUOXETINE 10 MG: 10 CAPSULE ORAL at 09:14

## 2023-11-30 RX ADMIN — METHYLPREDNISOLONE SODIUM SUCCINATE 40 MG: 40 INJECTION, POWDER, FOR SOLUTION INTRAMUSCULAR; INTRAVENOUS at 05:16

## 2023-11-30 RX ADMIN — INSULIN LISPRO 1 UNITS: 100 INJECTION, SOLUTION INTRAVENOUS; SUBCUTANEOUS at 12:19

## 2023-11-30 RX ADMIN — IPRATROPIUM BROMIDE AND ALBUTEROL SULFATE 1 DOSE: .5; 3 SOLUTION RESPIRATORY (INHALATION) at 21:07

## 2023-11-30 RX ADMIN — ENOXAPARIN SODIUM 40 MG: 100 INJECTION SUBCUTANEOUS at 09:13

## 2023-11-30 RX ADMIN — ROSUVASTATIN 10 MG: 10 TABLET, FILM COATED ORAL at 09:13

## 2023-11-30 RX ADMIN — LEVOTHYROXINE SODIUM 112 MCG: 0.11 TABLET ORAL at 05:16

## 2023-11-30 ASSESSMENT — PAIN - FUNCTIONAL ASSESSMENT
PAIN_FUNCTIONAL_ASSESSMENT: NONE - DENIES PAIN
PAIN_FUNCTIONAL_ASSESSMENT: NONE - DENIES PAIN

## 2023-11-30 ASSESSMENT — PAIN SCALES - GENERAL
PAINLEVEL_OUTOF10: 0

## 2023-11-30 NOTE — PLAN OF CARE
[facility] GLENN MARINO  Phone: [phone]    Inpatient Occupational Therapy Plan of Care  OT Orders Received and Evaluation Complete    Date: 2023  Patient Name: Bryce Umana        MRN: [id number]    : 1945  (70 y.o.)  Referring Practitioner: Dr. Shawn Quigley  Diagnosis: Community-acquired PNA  Treatment Diagnosis: Community-acquired PNA    Identified Problem Areas:     Performance deficits / Impairments: Decreased functional mobility , Decreased ADL status, Decreased ROM, Decreased strength, Decreased safe awareness, Decreased cognition, Decreased endurance, Decreased balance, Decreased high-level IADLs, Decreased fine motor control, Decreased coordination, Decreased posture     Justification for Skilled Services:     [x] Complete Daily Tasks Safely  [x] Improve Balance   [x]  Return to Prior Level of Function  [x] Family/Caregiver Education    [x] Improve UE strength  [x] Patient Education: [x]Adaptive Equipment   [x]Home Exercise Program and Progression    Treatment Plan:     Times Per Week: 3x  Times Per Day: Once a day  Discharge recommendation: SNF    [] Modalities:  [x] Therapeutic Exercise   [x] Therapeutic Activity  [] Splinting:     [] Home Safety Evaluation         [x] ADL Retraining                       [] Muscle Re-education [] Cognitive Retraining            [] Sensory Integration  [x] Patient Education [x] Home Exercise Program [] Fine Motor Coordination    Goals:     Short term goals:  Time Frame for Short Term Goals: 3 days (23)  Short Term Goal 1: Patient will complete a commode transfer while using DME PRN with CGA. Short Term Goal 2: Patient will complete upper body dressing and/or bathing while using adaptive equipment/techniques PRN with SETUP. Short Term Goal 3: Patient will complete lower body dressing and/or bathing while using adaptive equipment/techniques PRN with SBA. Short Term Goal 4:  To increase activity tolerance for ADLs, patient will engage in 10 minutes
Patient arrives to unit via cart. Assist x 3 staff to bed. Report received from [facility]. IV SL at this time. Oriented to room, call light system, bed rails, phone, lights, dry erase board and bathroom. Vital signs obtained. Nursing assessment complete. Orders reviewed with patient. Patient instructed about the schedule of the day including: vital sign frequency, lab draws, possible tests, frequency of MD and staff rounds, daily weights, I &O's and prescribed diet. Patient is alert and disoriented. Admission questions answered with Patient as source. Patient is is a high fall risk, discussed such with patient, as well as, fall prevention strategies. Family at bedside. Ice water provided. All care needs addressed with no further needs at this time. Patient is currently resting in bed with brakes locked, in lowest position, side rails up 2/4, call light and bedside table within reach.
Integrity - Adult  Goal: Skin integrity remains intact  Outcome: Progressing     Problem: Musculoskeletal - Adult  Goal: Return mobility to safest level of function  Outcome: Progressing  Goal: Return ADL status to a safe level of function  Outcome: Progressing     Problem: Gastrointestinal - Adult  Goal: Maintains or returns to baseline bowel function  Outcome: Progressing     Problem: Genitourinary - Adult  Goal: Absence of urinary retention  Outcome: Progressing     Problem: Infection - Adult  Goal: Absence of infection at discharge  Outcome: Progressing     Problem: Metabolic/Fluid and Electrolytes - Adult  Goal: Electrolytes maintained within normal limits  Outcome: Progressing  Goal: Glucose maintained within prescribed range  Outcome: Progressing     Problem: Hematologic - Adult  Goal: Maintains hematologic stability  Outcome: Progressing
PT   Date: 11/29/2023

## 2023-12-01 LAB
ANION GAP SERPL CALCULATED.3IONS-SCNC: 5 MMOL/L (ref 9–17)
BASOPHILS # BLD: 0.01 K/UL (ref 0–0.2)
BASOPHILS NFR BLD: 0 % (ref 0–2)
BUN SERPL-MCNC: 25 MG/DL (ref 8–23)
BUN/CREAT SERPL: 50 (ref 9–20)
CALCIUM SERPL-MCNC: 8.9 MG/DL (ref 8.6–10.4)
CHLORIDE SERPL-SCNC: 97 MMOL/L (ref 98–107)
CO2 SERPL-SCNC: 35 MMOL/L (ref 20–31)
CREAT SERPL-MCNC: 0.5 MG/DL (ref 0.7–1.2)
EOSINOPHIL # BLD: 0.07 K/UL (ref 0–0.4)
EOSINOPHILS RELATIVE PERCENT: 1 % (ref 0–5)
ERYTHROCYTE [DISTWIDTH] IN BLOOD BY AUTOMATED COUNT: 14 % (ref 12.1–15.2)
GFR SERPL CREATININE-BSD FRML MDRD: >60 ML/MIN/1.73M2
GLUCOSE BLD-MCNC: 101 MG/DL (ref 65–99)
GLUCOSE BLD-MCNC: 140 MG/DL (ref 65–99)
GLUCOSE BLD-MCNC: 184 MG/DL (ref 65–99)
GLUCOSE BLD-MCNC: 188 MG/DL (ref 65–99)
GLUCOSE SERPL-MCNC: 123 MG/DL (ref 70–99)
HCT VFR BLD AUTO: 27.5 % (ref 41–53)
HGB BLD-MCNC: 8.3 G/DL (ref 13.5–17.5)
IMM GRANULOCYTES # BLD AUTO: 0.11 K/UL (ref 0–0.3)
IMM GRANULOCYTES NFR BLD: 1 % (ref 0–5)
LYMPHOCYTES NFR BLD: 1.18 K/UL (ref 1–4.8)
LYMPHOCYTES RELATIVE PERCENT: 11 % (ref 13–44)
MCH RBC QN AUTO: 25.1 PG (ref 26–34)
MCHC RBC AUTO-ENTMCNC: 30.2 G/DL (ref 31–37)
MCV RBC AUTO: 83.1 FL (ref 80–100)
MONOCYTES NFR BLD: 0.86 K/UL (ref 0–1)
MONOCYTES NFR BLD: 8 % (ref 5–9)
NEUTROPHILS NFR BLD: 80 % (ref 39–75)
NEUTS SEG NFR BLD: 8.71 K/UL (ref 2.1–6.5)
PLATELET # BLD AUTO: 353 K/UL (ref 140–450)
PMV BLD AUTO: 9.8 FL (ref 6–12)
POTASSIUM SERPL-SCNC: 4.3 MMOL/L (ref 3.7–5.3)
RBC # BLD AUTO: 3.31 M/UL (ref 4.5–5.9)
SODIUM SERPL-SCNC: 137 MMOL/L (ref 135–144)
WBC OTHER # BLD: 10.9 K/UL (ref 3.5–11)

## 2023-12-01 PROCEDURE — 80048 BASIC METABOLIC PNL TOTAL CA: CPT

## 2023-12-01 PROCEDURE — 97535 SELF CARE MNGMENT TRAINING: CPT

## 2023-12-01 PROCEDURE — 1200000002 HC SEMI PRIVATE SWING BED

## 2023-12-01 PROCEDURE — 94640 AIRWAY INHALATION TREATMENT: CPT

## 2023-12-01 PROCEDURE — 6360000002 HC RX W HCPCS: Performed by: INTERNAL MEDICINE

## 2023-12-01 PROCEDURE — 94761 N-INVAS EAR/PLS OXIMETRY MLT: CPT

## 2023-12-01 PROCEDURE — 6370000000 HC RX 637 (ALT 250 FOR IP): Performed by: INTERNAL MEDICINE

## 2023-12-01 PROCEDURE — 85025 COMPLETE CBC W/AUTO DIFF WBC: CPT

## 2023-12-01 PROCEDURE — 97530 THERAPEUTIC ACTIVITIES: CPT

## 2023-12-01 PROCEDURE — 94669 MECHANICAL CHEST WALL OSCILL: CPT

## 2023-12-01 PROCEDURE — 36415 COLL VENOUS BLD VENIPUNCTURE: CPT

## 2023-12-01 PROCEDURE — 2580000003 HC RX 258: Performed by: INTERNAL MEDICINE

## 2023-12-01 PROCEDURE — 82947 ASSAY GLUCOSE BLOOD QUANT: CPT

## 2023-12-01 PROCEDURE — 97116 GAIT TRAINING THERAPY: CPT

## 2023-12-01 PROCEDURE — 2700000000 HC OXYGEN THERAPY PER DAY

## 2023-12-01 RX ADMIN — ALBUTEROL SULFATE 2.5 MG: 2.5 SOLUTION RESPIRATORY (INHALATION) at 05:19

## 2023-12-01 RX ADMIN — METFORMIN HYDROCHLORIDE 1000 MG: 1000 TABLET, FILM COATED ORAL at 08:25

## 2023-12-01 RX ADMIN — PIPERACILLIN AND TAZOBACTAM 3375 MG: 3; .375 INJECTION, POWDER, LYOPHILIZED, FOR SOLUTION INTRAVENOUS at 02:39

## 2023-12-01 RX ADMIN — FLUOXETINE 10 MG: 10 CAPSULE ORAL at 08:25

## 2023-12-01 RX ADMIN — LEVOTHYROXINE SODIUM 112 MCG: 0.11 TABLET ORAL at 05:26

## 2023-12-01 RX ADMIN — IPRATROPIUM BROMIDE AND ALBUTEROL SULFATE 1 DOSE: .5; 3 SOLUTION RESPIRATORY (INHALATION) at 13:29

## 2023-12-01 RX ADMIN — PREDNISONE 20 MG: 20 TABLET ORAL at 08:25

## 2023-12-01 RX ADMIN — PANTOPRAZOLE SODIUM 40 MG: 40 TABLET, DELAYED RELEASE ORAL at 05:26

## 2023-12-01 RX ADMIN — IPRATROPIUM BROMIDE AND ALBUTEROL SULFATE 1 DOSE: .5; 3 SOLUTION RESPIRATORY (INHALATION) at 16:24

## 2023-12-01 RX ADMIN — SODIUM CHLORIDE, PRESERVATIVE FREE 10 ML: 5 INJECTION INTRAVENOUS at 08:26

## 2023-12-01 RX ADMIN — CELECOXIB 200 MG: 100 CAPSULE ORAL at 08:24

## 2023-12-01 RX ADMIN — IPRATROPIUM BROMIDE AND ALBUTEROL SULFATE 1 DOSE: .5; 3 SOLUTION RESPIRATORY (INHALATION) at 22:05

## 2023-12-01 RX ADMIN — Medication 1 CAPSULE: at 08:25

## 2023-12-01 RX ADMIN — IPRATROPIUM BROMIDE AND ALBUTEROL SULFATE 1 DOSE: .5; 3 SOLUTION RESPIRATORY (INHALATION) at 09:24

## 2023-12-01 RX ADMIN — ENOXAPARIN SODIUM 40 MG: 100 INJECTION SUBCUTANEOUS at 08:24

## 2023-12-01 RX ADMIN — ROSUVASTATIN 10 MG: 10 TABLET, FILM COATED ORAL at 08:25

## 2023-12-01 RX ADMIN — PIPERACILLIN AND TAZOBACTAM 3375 MG: 3; .375 INJECTION, POWDER, LYOPHILIZED, FOR SOLUTION INTRAVENOUS at 10:12

## 2023-12-01 RX ADMIN — PIPERACILLIN AND TAZOBACTAM 3375 MG: 3; .375 INJECTION, POWDER, LYOPHILIZED, FOR SOLUTION INTRAVENOUS at 17:58

## 2023-12-01 ASSESSMENT — PAIN SCALES - GENERAL
PAINLEVEL_OUTOF10: 0

## 2023-12-01 ASSESSMENT — PAIN - FUNCTIONAL ASSESSMENT: PAIN_FUNCTIONAL_ASSESSMENT: NONE - DENIES PAIN

## 2023-12-01 NOTE — H&P
History & Physical    Patient:  Curtis Yates  YOB: 1945  Date of Service: 12/1/2023  MRN: [id number]   Acct:   [de-identified]   Primary Care Physician: Justin Leung MD    Chief Complaint: Pneumonia, generalized weakness    History of Present Illness: The patient is a 70 y.o. male with history of advanced COPD with chronic hypoxemic hypercapnic respiratory failure which he is on 5L NC oxygen at home,  , history of diabetes, sepsis due to RSV pneumonia, moderate malnutrition, pulmonary nodules, tobacco abuse was brought to the emergency room by family members for evaluation of elevated heart rate, decreased appetite and some confusion. Patient is somewhat confused this morning and not able to provide me with history. His granddaughter Rashid came to visit him and told me that the patient used to live by himself independently up until about 2 weeks ago when he started declining mentally and physically. Granddaughter had him move in with her. She noticed that the patient has been sleeping a lot, not eating, getting more and more confused. Patient did not complain of any pains, shortness of breath, he did not have increased from baseline cough. Patient is on continuous 5 L of oxygen for the past 2 years due to advanced COPD. Apparently was still smoking more than a pack a day. Yesterday he had an appointment with his primary care physician in [address] [place] [address] and apparently was tachycardic in office and for this reason was advised to be taken to the emergency room for evaluation. Work-up in the emergency room revealed temperature 98, respirations 26, pulse 95, blood pressure 107/69. Patient was 99% on 5 L nasal cannula. BMP revealed sodium 132, potassium 4.3, chloride 93, CO2 34, BUN 21, creatinine 1.6, lactic acid was 1.3. CBC revealed him WBC count of 10.7, hemoglobin 9.3, hematocrit 30.5, MCV 82, patient had positive fecal occult blood.   COVID-19 was negative, urinalysis was negative for

## 2023-12-02 LAB
GLUCOSE BLD-MCNC: 202 MG/DL (ref 65–99)
GLUCOSE BLD-MCNC: 209 MG/DL (ref 65–99)
GLUCOSE BLD-MCNC: 95 MG/DL (ref 65–99)

## 2023-12-02 PROCEDURE — 6360000002 HC RX W HCPCS: Performed by: INTERNAL MEDICINE

## 2023-12-02 PROCEDURE — 82947 ASSAY GLUCOSE BLOOD QUANT: CPT

## 2023-12-02 PROCEDURE — 94640 AIRWAY INHALATION TREATMENT: CPT

## 2023-12-02 PROCEDURE — 2700000000 HC OXYGEN THERAPY PER DAY

## 2023-12-02 PROCEDURE — 1200000002 HC SEMI PRIVATE SWING BED

## 2023-12-02 PROCEDURE — 94669 MECHANICAL CHEST WALL OSCILL: CPT

## 2023-12-02 PROCEDURE — 6370000000 HC RX 637 (ALT 250 FOR IP): Performed by: INTERNAL MEDICINE

## 2023-12-02 PROCEDURE — 97530 THERAPEUTIC ACTIVITIES: CPT

## 2023-12-02 PROCEDURE — 97116 GAIT TRAINING THERAPY: CPT

## 2023-12-02 PROCEDURE — 97110 THERAPEUTIC EXERCISES: CPT

## 2023-12-02 PROCEDURE — 2580000003 HC RX 258: Performed by: INTERNAL MEDICINE

## 2023-12-02 PROCEDURE — 94761 N-INVAS EAR/PLS OXIMETRY MLT: CPT

## 2023-12-02 RX ADMIN — PREDNISONE 20 MG: 20 TABLET ORAL at 08:10

## 2023-12-02 RX ADMIN — FLUOXETINE 10 MG: 10 CAPSULE ORAL at 08:10

## 2023-12-02 RX ADMIN — SODIUM CHLORIDE, PRESERVATIVE FREE 10 ML: 5 INJECTION INTRAVENOUS at 07:05

## 2023-12-02 RX ADMIN — IPRATROPIUM BROMIDE AND ALBUTEROL SULFATE 1 DOSE: .5; 3 SOLUTION RESPIRATORY (INHALATION) at 16:27

## 2023-12-02 RX ADMIN — ALBUTEROL SULFATE 2.5 MG: 2.5 SOLUTION RESPIRATORY (INHALATION) at 05:11

## 2023-12-02 RX ADMIN — PANTOPRAZOLE SODIUM 40 MG: 40 TABLET, DELAYED RELEASE ORAL at 06:17

## 2023-12-02 RX ADMIN — PIPERACILLIN AND TAZOBACTAM 3375 MG: 3; .375 INJECTION, POWDER, LYOPHILIZED, FOR SOLUTION INTRAVENOUS at 18:23

## 2023-12-02 RX ADMIN — IPRATROPIUM BROMIDE AND ALBUTEROL SULFATE 1 DOSE: .5; 3 SOLUTION RESPIRATORY (INHALATION) at 13:04

## 2023-12-02 RX ADMIN — CELECOXIB 200 MG: 100 CAPSULE ORAL at 08:10

## 2023-12-02 RX ADMIN — PIPERACILLIN AND TAZOBACTAM 3375 MG: 3; .375 INJECTION, POWDER, LYOPHILIZED, FOR SOLUTION INTRAVENOUS at 02:47

## 2023-12-02 RX ADMIN — IPRATROPIUM BROMIDE AND ALBUTEROL SULFATE 1 DOSE: .5; 3 SOLUTION RESPIRATORY (INHALATION) at 09:23

## 2023-12-02 RX ADMIN — ACETAMINOPHEN 650 MG: 325 TABLET, FILM COATED ORAL at 22:04

## 2023-12-02 RX ADMIN — SODIUM CHLORIDE, PRESERVATIVE FREE 10 ML: 5 INJECTION INTRAVENOUS at 10:30

## 2023-12-02 RX ADMIN — PIPERACILLIN AND TAZOBACTAM 3375 MG: 3; .375 INJECTION, POWDER, LYOPHILIZED, FOR SOLUTION INTRAVENOUS at 10:35

## 2023-12-02 RX ADMIN — METFORMIN HYDROCHLORIDE 1000 MG: 1000 TABLET, FILM COATED ORAL at 08:10

## 2023-12-02 RX ADMIN — ROSUVASTATIN 10 MG: 10 TABLET, FILM COATED ORAL at 08:10

## 2023-12-02 RX ADMIN — GUAIFENESIN AND DEXTROMETHORPHAN 5 ML: 100; 10 SYRUP ORAL at 22:04

## 2023-12-02 RX ADMIN — Medication 1 CAPSULE: at 08:10

## 2023-12-02 RX ADMIN — LEVOTHYROXINE SODIUM 112 MCG: 0.11 TABLET ORAL at 06:17

## 2023-12-02 RX ADMIN — ENOXAPARIN SODIUM 40 MG: 100 INJECTION SUBCUTANEOUS at 08:06

## 2023-12-02 RX ADMIN — SODIUM CHLORIDE 50 ML: 9 INJECTION, SOLUTION INTRAVENOUS at 10:37

## 2023-12-02 RX ADMIN — INSULIN LISPRO 1 UNITS: 100 INJECTION, SOLUTION INTRAVENOUS; SUBCUTANEOUS at 11:41

## 2023-12-02 RX ADMIN — IPRATROPIUM BROMIDE AND ALBUTEROL SULFATE 1 DOSE: .5; 3 SOLUTION RESPIRATORY (INHALATION) at 21:24

## 2023-12-02 RX ADMIN — INSULIN LISPRO 1 UNITS: 100 INJECTION, SOLUTION INTRAVENOUS; SUBCUTANEOUS at 16:43

## 2023-12-02 ASSESSMENT — PAIN SCALES - GENERAL
PAINLEVEL_OUTOF10: 0
PAINLEVEL_OUTOF10: 0
PAINLEVEL_OUTOF10: 3
PAINLEVEL_OUTOF10: 0

## 2023-12-03 LAB
GLUCOSE BLD-MCNC: 126 MG/DL (ref 65–99)
GLUCOSE BLD-MCNC: 132 MG/DL (ref 65–99)
GLUCOSE BLD-MCNC: 169 MG/DL (ref 65–99)
GLUCOSE BLD-MCNC: 177 MG/DL (ref 65–99)
GLUCOSE BLD-MCNC: 194 MG/DL (ref 65–99)
MICROORGANISM SPEC CULT: NORMAL
MICROORGANISM SPEC CULT: NORMAL
SERVICE CMNT-IMP: NORMAL
SERVICE CMNT-IMP: NORMAL
SPECIMEN DESCRIPTION: NORMAL
SPECIMEN DESCRIPTION: NORMAL

## 2023-12-03 PROCEDURE — 97110 THERAPEUTIC EXERCISES: CPT

## 2023-12-03 PROCEDURE — 6370000000 HC RX 637 (ALT 250 FOR IP): Performed by: INTERNAL MEDICINE

## 2023-12-03 PROCEDURE — 2700000000 HC OXYGEN THERAPY PER DAY

## 2023-12-03 PROCEDURE — 6360000002 HC RX W HCPCS: Performed by: INTERNAL MEDICINE

## 2023-12-03 PROCEDURE — 94669 MECHANICAL CHEST WALL OSCILL: CPT

## 2023-12-03 PROCEDURE — 94640 AIRWAY INHALATION TREATMENT: CPT

## 2023-12-03 PROCEDURE — 1200000002 HC SEMI PRIVATE SWING BED

## 2023-12-03 PROCEDURE — 2580000003 HC RX 258: Performed by: INTERNAL MEDICINE

## 2023-12-03 PROCEDURE — 97535 SELF CARE MNGMENT TRAINING: CPT

## 2023-12-03 PROCEDURE — 94761 N-INVAS EAR/PLS OXIMETRY MLT: CPT

## 2023-12-03 PROCEDURE — 97530 THERAPEUTIC ACTIVITIES: CPT

## 2023-12-03 RX ADMIN — IPRATROPIUM BROMIDE AND ALBUTEROL SULFATE 1 DOSE: .5; 3 SOLUTION RESPIRATORY (INHALATION) at 13:21

## 2023-12-03 RX ADMIN — METFORMIN HYDROCHLORIDE 1000 MG: 1000 TABLET, FILM COATED ORAL at 09:28

## 2023-12-03 RX ADMIN — IPRATROPIUM BROMIDE AND ALBUTEROL SULFATE 1 DOSE: .5; 3 SOLUTION RESPIRATORY (INHALATION) at 20:48

## 2023-12-03 RX ADMIN — LEVOTHYROXINE SODIUM 112 MCG: 0.11 TABLET ORAL at 05:45

## 2023-12-03 RX ADMIN — PIPERACILLIN AND TAZOBACTAM 3375 MG: 3; .375 INJECTION, POWDER, LYOPHILIZED, FOR SOLUTION INTRAVENOUS at 18:31

## 2023-12-03 RX ADMIN — ALBUTEROL SULFATE 2.5 MG: 2.5 SOLUTION RESPIRATORY (INHALATION) at 04:53

## 2023-12-03 RX ADMIN — PIPERACILLIN AND TAZOBACTAM 3375 MG: 3; .375 INJECTION, POWDER, LYOPHILIZED, FOR SOLUTION INTRAVENOUS at 02:45

## 2023-12-03 RX ADMIN — IPRATROPIUM BROMIDE AND ALBUTEROL SULFATE 1 DOSE: .5; 3 SOLUTION RESPIRATORY (INHALATION) at 09:30

## 2023-12-03 RX ADMIN — FLUOXETINE 10 MG: 10 CAPSULE ORAL at 09:28

## 2023-12-03 RX ADMIN — CELECOXIB 200 MG: 100 CAPSULE ORAL at 09:28

## 2023-12-03 RX ADMIN — PIPERACILLIN AND TAZOBACTAM 3375 MG: 3; .375 INJECTION, POWDER, LYOPHILIZED, FOR SOLUTION INTRAVENOUS at 10:31

## 2023-12-03 RX ADMIN — Medication 1 CAPSULE: at 09:28

## 2023-12-03 RX ADMIN — ENOXAPARIN SODIUM 40 MG: 100 INJECTION SUBCUTANEOUS at 09:28

## 2023-12-03 RX ADMIN — ROSUVASTATIN 10 MG: 10 TABLET, FILM COATED ORAL at 09:28

## 2023-12-03 RX ADMIN — PANTOPRAZOLE SODIUM 40 MG: 40 TABLET, DELAYED RELEASE ORAL at 05:45

## 2023-12-03 RX ADMIN — IPRATROPIUM BROMIDE AND ALBUTEROL SULFATE 1 DOSE: .5; 3 SOLUTION RESPIRATORY (INHALATION) at 16:31

## 2023-12-03 RX ADMIN — SODIUM CHLORIDE, PRESERVATIVE FREE 10 ML: 5 INJECTION INTRAVENOUS at 09:29

## 2023-12-03 ASSESSMENT — PAIN SCALES - GENERAL: PAINLEVEL_OUTOF10: 0

## 2023-12-03 NOTE — PLAN OF CARE
Problem: Discharge Planning  Goal: Discharge to home or other facility with appropriate resources  Outcome: Progressing     Problem: Safety - Adult  Goal: Free from fall injury  Outcome: Progressing     Problem: Skin/Tissue Integrity  Goal: Absence of new skin breakdown  Description: 1. Monitor for areas of redness and/or skin breakdown  2. Assess vascular access sites hourly  3. Every 4-6 hours minimum:  Change oxygen saturation probe site  4. Every 4-6 hours:  If on nasal continuous positive airway pressure, respiratory therapy assess nares and determine need for appliance change or resting period. Outcome: Progressing     Problem: ABCDS Injury Assessment  Goal: Absence of physical injury  Outcome: Progressing     Problem: Coping  Goal: Pt/Family able to verbalize concerns and demonstrate effective coping strategies  Description: INTERVENTIONS:  1. Assist patient/family to identify coping skills, available support systems and cultural and spiritual values  2. Provide emotional support, including active listening and acknowledgement of concerns of patient and caregivers  3. Reduce environmental stimuli, as able  4. Instruct patient/family in relaxation techniques, as appropriate  5. Assess for spiritual pain/suffering and initiate Spiritual Care, Psychosocial Clinical Specialist consults as needed  Outcome: Progressing     Problem: Decision Making  Goal: Pt/Family able to effectively weigh alternatives and participate in decision making related to treatment and care  Description: INTERVENTIONS:  1. Determine when there are differences between patient's view, family's view, and healthcare provider's view of condition  2. Facilitate patient and family articulation of goals for care  3. Help patient and family identify pros/cons of alternative solutions  4. Provide information as requested by patient/family  5. Respect patient/family right to receive or not to receive information  6.  Serve as a liaison between
Problem: Physical Therapy - Adult  Goal: By Discharge: Performs mobility at highest level of function for planned discharge setting. See evaluation for individualized goals.   Outcome: Progressing
down 2 steps with handrail with min assist to enter/exit home    CLOVER CHEW PT,    Date: 12/1/2023
patient will engage in 10 minutes of BUE ther ex while maintaining SPO2 level at/above 90% without a rest break. Short Term Goal 5: To increase independence and safety with ADLs, patient will tolerate static/dynamic standing x5 minutes while maintaining SPO2 level at/above 90% without LOB.     Long term goals:  STGs=LTGs         Shauna Duffy, OTR/L                      Date: 12/1/2023

## 2023-12-04 VITALS
TEMPERATURE: 97.5 F | RESPIRATION RATE: 18 BRPM | WEIGHT: 125.9 LBS | OXYGEN SATURATION: 98 % | HEART RATE: 91 BPM | SYSTOLIC BLOOD PRESSURE: 136 MMHG | HEIGHT: 71 IN | BODY MASS INDEX: 17.63 KG/M2 | DIASTOLIC BLOOD PRESSURE: 73 MMHG

## 2023-12-04 LAB
ALBUMIN SERPL-MCNC: 3 G/DL (ref 3.5–5.2)
ALP SERPL-CCNC: 61 U/L (ref 40–129)
ALT SERPL-CCNC: 18 U/L (ref 5–41)
ANION GAP SERPL CALCULATED.3IONS-SCNC: 3 MMOL/L (ref 9–17)
AST SERPL-CCNC: 11 U/L
BILIRUB SERPL-MCNC: 0.1 MG/DL (ref 0.3–1.2)
BUN SERPL-MCNC: 21 MG/DL (ref 8–23)
BUN/CREAT SERPL: 42 (ref 9–20)
CALCIUM SERPL-MCNC: 8.8 MG/DL (ref 8.6–10.4)
CHLORIDE SERPL-SCNC: 94 MMOL/L (ref 98–107)
CO2 SERPL-SCNC: 38 MMOL/L (ref 20–31)
CREAT SERPL-MCNC: 0.5 MG/DL (ref 0.7–1.2)
GFR SERPL CREATININE-BSD FRML MDRD: >60 ML/MIN/1.73M2
GLUCOSE BLD-MCNC: 128 MG/DL (ref 65–99)
GLUCOSE BLD-MCNC: 132 MG/DL (ref 65–99)
GLUCOSE SERPL-MCNC: 108 MG/DL (ref 70–99)
HCT VFR BLD AUTO: 26.9 % (ref 41–53)
HGB BLD-MCNC: 8.3 G/DL (ref 13.5–17.5)
MICROORGANISM SPEC CULT: NORMAL
MICROORGANISM SPEC CULT: NORMAL
POTASSIUM SERPL-SCNC: 4.6 MMOL/L (ref 3.7–5.3)
PROT SERPL-MCNC: 5.7 G/DL (ref 6.4–8.3)
SERVICE CMNT-IMP: NORMAL
SERVICE CMNT-IMP: NORMAL
SODIUM SERPL-SCNC: 135 MMOL/L (ref 135–144)
SPECIMEN DESCRIPTION: NORMAL
SPECIMEN DESCRIPTION: NORMAL

## 2023-12-04 PROCEDURE — 6370000000 HC RX 637 (ALT 250 FOR IP): Performed by: INTERNAL MEDICINE

## 2023-12-04 PROCEDURE — 82947 ASSAY GLUCOSE BLOOD QUANT: CPT

## 2023-12-04 PROCEDURE — 85018 HEMOGLOBIN: CPT

## 2023-12-04 PROCEDURE — 97530 THERAPEUTIC ACTIVITIES: CPT

## 2023-12-04 PROCEDURE — 94761 N-INVAS EAR/PLS OXIMETRY MLT: CPT

## 2023-12-04 PROCEDURE — 2580000003 HC RX 258: Performed by: INTERNAL MEDICINE

## 2023-12-04 PROCEDURE — 94669 MECHANICAL CHEST WALL OSCILL: CPT

## 2023-12-04 PROCEDURE — 94640 AIRWAY INHALATION TREATMENT: CPT

## 2023-12-04 PROCEDURE — 80053 COMPREHEN METABOLIC PANEL: CPT

## 2023-12-04 PROCEDURE — 2700000000 HC OXYGEN THERAPY PER DAY

## 2023-12-04 PROCEDURE — 6360000002 HC RX W HCPCS: Performed by: INTERNAL MEDICINE

## 2023-12-04 PROCEDURE — 36415 COLL VENOUS BLD VENIPUNCTURE: CPT

## 2023-12-04 PROCEDURE — 85014 HEMATOCRIT: CPT

## 2023-12-04 RX ADMIN — ALBUTEROL SULFATE 2.5 MG: 2.5 SOLUTION RESPIRATORY (INHALATION) at 04:54

## 2023-12-04 RX ADMIN — CELECOXIB 200 MG: 100 CAPSULE ORAL at 09:08

## 2023-12-04 RX ADMIN — SODIUM CHLORIDE, PRESERVATIVE FREE 10 ML: 5 INJECTION INTRAVENOUS at 09:08

## 2023-12-04 RX ADMIN — Medication 1 CAPSULE: at 09:08

## 2023-12-04 RX ADMIN — PANTOPRAZOLE SODIUM 40 MG: 40 TABLET, DELAYED RELEASE ORAL at 06:28

## 2023-12-04 RX ADMIN — PIPERACILLIN AND TAZOBACTAM 3375 MG: 3; .375 INJECTION, POWDER, LYOPHILIZED, FOR SOLUTION INTRAVENOUS at 02:36

## 2023-12-04 RX ADMIN — FLUOXETINE 10 MG: 10 CAPSULE ORAL at 09:08

## 2023-12-04 RX ADMIN — METFORMIN HYDROCHLORIDE 1000 MG: 1000 TABLET, FILM COATED ORAL at 09:11

## 2023-12-04 RX ADMIN — ROSUVASTATIN 10 MG: 10 TABLET, FILM COATED ORAL at 09:08

## 2023-12-04 RX ADMIN — PIPERACILLIN AND TAZOBACTAM 3375 MG: 3; .375 INJECTION, POWDER, LYOPHILIZED, FOR SOLUTION INTRAVENOUS at 10:24

## 2023-12-04 RX ADMIN — IPRATROPIUM BROMIDE AND ALBUTEROL SULFATE 1 DOSE: .5; 3 SOLUTION RESPIRATORY (INHALATION) at 13:11

## 2023-12-04 RX ADMIN — IPRATROPIUM BROMIDE AND ALBUTEROL SULFATE 1 DOSE: .5; 3 SOLUTION RESPIRATORY (INHALATION) at 08:51

## 2023-12-04 RX ADMIN — LEVOTHYROXINE SODIUM 112 MCG: 0.11 TABLET ORAL at 06:28

## 2023-12-04 RX ADMIN — SODIUM CHLORIDE, PRESERVATIVE FREE 10 ML: 5 INJECTION INTRAVENOUS at 10:22

## 2023-12-04 ASSESSMENT — PAIN SCALES - GENERAL: PAINLEVEL_OUTOF10: 0

## 2023-12-04 NOTE — DISCHARGE INSTR - COC
Date: skilled 11/30/2023    Readmission Risk Assessment Score:  Readmission Risk              Risk of Unplanned Readmission:  9           Discharging to Facility/ Agency   Name: [facility]  Address: [place] [address]  Phone: [phone]  Fax: [phone]    Dialysis Facility (if applicable)   Name:  Address:  Dialysis Schedule:  Phone:  Fax:    / signature: Electronically signed by FRANCISCO Giang on 12/4/23 at 11:43 AM EST    PHYSICIAN SECTION    Prognosis: Good    Condition at Discharge: Stable    Rehab Potential (if transferring to Rehab): Good    Recommended Labs or Other Treatments After Discharge: PT / OT    Physician Certification: I certify the above information and transfer of Snaam Arora  is necessary for the continuing treatment of the diagnosis listed and that he requires [address] [place] [address] for greater 30 days.      Update Admission H&P: No change in H&P    PHYSICIAN SIGNATURE:  Electronically signed by Juvencio Sutton MD on 12/4/23 at 11:48 AM EST

## 2023-12-04 NOTE — ACP (ADVANCE CARE PLANNING)
does not typically restart the heart for people who have serious health conditions or who are very sick. \"     \"In the event your heart stopped as a result of an underlying serious health condition, would you want attempts to be made to restart your heart (answer \"yes\" for attempt to resuscitate) or would you prefer a natural death (answer \"no\" for do not attempt to resuscitate)? \" yes         [] Yes   [x] No   Educated Patient / Aldo Clifton regarding differences between Advance Directives and portable DNR orders.      Length of ACP Conversation in minutes:  5     Conversation Outcomes:  ACP discussion completed     Follow-up plan:    [] Schedule follow-up conversation to continue planning  [] Referred individual to Provider for additional questions/concerns   [] Advised patient/agent/surrogate to review completed ACP document and update if needed with changes in condition, patient preferences or care setting     [] This note routed to one or more involved healthcare providers

## 2023-12-04 NOTE — DISCHARGE INSTRUCTIONS
Discharge Instructions    Admission Date:  11/30/2023  Discharge Date:  12/4/23    Disposition:  Home    Discharge Instructions:  Resume previous home medication. Schedule CT scan of the chest in 3 months at discharge. Referral to Dr. Julia Osei to evaluate for endoscopy with heme positive stool / anemia. Activity:  As tolerated. Diet:  Diabetic. Recommend Glucerna three times a day. Home care for nursing, PT, and OT. Follow up with Suzette Meneses MD in 1 week.

## 2023-12-04 NOTE — PROGRESS NOTES
Caregiver-granddaughter Tata-feels that pt is at baseline and wants to take pt home today.
[facility] PRINCESS  Phone: [phone]    Occupational Therapy Inpatient Daily Note  Date: 2023  Patient Name: Elidia Nelson        MRN: [id number]    : 1945  (70 y.o.)    Subjective:     Subjective: Patient was lying supine in bed with sister present upon OT arrival. Was agreeable to OT interventions. Pt is PROGRESSING toward goals and independence of Self Care this treatment session    Discharge recommendation: Continue to assess Pending Progress    Objective:     ADLs:  Feeding: None  Grooming: Contact guard assistance (washing hands and face in standing with verbal cues for initiation/continuation/termination)  UE Bathing: None  LE Bathing: None  UE Dressing: None  LE Dressing: Moderate assistance (donning elastic-waist brief from ankle level)  Toileting: Minimal assistance, Contact guard assistance (MIN A: clothing management in standing; CGA: lucian care in standing)    Transfers:  Sit to stand: Contact guard assistance (with verbal cues for proper hand placement)   Stand to sit: Contact guard assistance (with verbal cues for proper hand/walker placement and body positioning)  Toilet Transfer: Minimal assistance (with verbal cues for proper hand placement)    Assessment:     Assessment: Patient was lying supine in bed with sister present following PTA treatment session upon OT arrival. Was agreeable to OT interventions. Completed ADLs and functional transfers as documented above. Continues to demonstrate congitive decline during ADLs requiring cues for initiation, continuation and termination of tasks. Performed functional mobility to/from bathroom via FWW/gait belt with CGA and verbal cues for proper body positioning (e.g. to keep BLE inside walker frame) and for safe walker negotiation. Tolerated dynamic standing x~1 minute/30 seconds while completing toileting tasks and x~3 minutes/30 seconds while completing hand/face washing at sinkside.  Did not experience any LOB nor require any rest
[facility] GLENN MARINO  Occupational Therapy    Date: 2023  Patient Name: Mendy Lainez        : 1945       [] Pt Refusal           [x] Pt Unavailable due to: still eating breakfast. Will re-attempt OT treatment session at 0900.         ANIRUDH Guillen/NENITA Date: 2023
DAVID and RN case manager met with pt and pt's granddtr this morning to discuss swing bed and discharge planning. Pt sleeping through most of visit. SW provided swing bed packet and reviewed with granddtr and she signs for acknowledgement of receipt which is placed in paper chart. Pt's swing bed assessment remains as follows:   Pt is a 70year old male admitted to swing bed from [facility] on 11/30/2023 for weakness due to community acquired pneumonia. Pt is living with his granddtr in her home in [facility]. Pt has a walker, shower chair, and home oxygen to use at home. Pt attends the [address] [place] [address] in [place] for medical care. Pt relies on family to transport him. Pt is a full code and follows with Dr. Sindhu Saini as PCP and also Florentino Cho CNP at the [address] [place] [address] for PCP. Pt does not have advance directives on file and family uncertain if pt has these. Dtr feels that his other dtr Angel Clark might be his medical POA. ACP note completed with family as best as they could answer on his behalf. Pt receives his medications through the [address] [place] [address] and they are affordable. Family plans for pt to return home with cariner as before. Granddtr reports that she has rescheduled the appointment with the [address] [place] [address] for 12/12/2023 to discuss getting help at home for pt care as well. Drew Vera states that she would like to take pt home with her today. Granddtr reports that it would be easier for her to care for him at home. She understands that pt may stay longer for continued therapy. Drew Vera is interested in skilled [address] services for pt and she chooses [facility] [address] for pt as he has had them in the past. DAVID made referral to [facility] [address] and they will plan to see pt tomorrow for PT, OT, and RN services. SW will fax [address] order once it is received. No medicare notice of non coverage letter provided as pt is leaving prior to discharge of skilled services. Granddtr expresses no other needs or concerns at this time.  [facility]
Hospitalist Progress Note  12/4/2023 5:59 AM  Subjective:   Admit Date: 11/30/2023  PCP: Ca Villalba MD    Interval History: Otilio Lau has no complaints this am other than saying, \"I want to go home\". He denies chest pain and feels his breathing is back to baseline. Appetite is \"okay\", no nausea. Otilio Lau states his bowels are moving and he denies any trouble urinating. Otilio Lau says his grandson lives with him and helps to care for him. Diet: ADULT DIET; Regular  ADULT ORAL NUTRITION SUPPLEMENT; Breakfast, Lunch, Dinner; Other Oral Supplement; Two Vini HN  Medications:   Scheduled Meds:   sodium chloride flush  5-40 mL IntraVENous 2 times per day    enoxaparin  40 mg SubCUTAneous Daily    ipratropium 0.5 mg-albuterol 2.5 mg  1 Dose Inhalation Q4H WA RT    insulin lispro  0-4 Units SubCUTAneous TID WC    insulin lispro  0-4 Units SubCUTAneous Nightly    celecoxib  200 mg Oral Daily    FLUoxetine  10 mg Oral Daily    levothyroxine  112 mcg Oral Daily    pantoprazole  40 mg Oral QAM AC    rosuvastatin  10 mg Oral Daily    piperacillin-tazobactam  3,375 mg IntraVENous Q8H    lactobacillus  1 capsule Oral Daily with breakfast    metFORMIN  1,000 mg Oral Daily with breakfast     Continuous Infusions:   sodium chloride 12.5 mL/hr at 12/03/23 1651    dextrose         Patient's current medications documented, reviewed, and updated. CBC: No results for input(s): \"WBC\", \"HGB\", \"PLT\" in the last 72 hours. BMP:  No results for input(s): \"NA\", \"K\", \"CL\", \"CO2\", \"BUN\", \"CREATININE\", \"GLUCOSE\" in the last 72 hours. Hepatic: No results for input(s): \"AST\", \"ALT\", \"ALB\", \"BILITOT\", \"ALKPHOS\" in the last 72 hours. Troponin: No results for input(s): \"TROPONINI\" in the last 72 hours. BNP: No results for input(s): \"BNP\" in the last 72 hours. Lipids: No results for input(s): \"CHOL\", \"HDL\" in the last 72 hours. Invalid input(s): \"LDLCALCU\"  INR: No results for input(s): \"INR\" in the last 72 hours.       Objective:   Vitals: BP
[facility] JOSEFA SÁNCHEZ  Physical Therapy    Date: 2023  Patient Name: Trung Palafox        : 1945       [] Pt Refusal           [x] Pt Unavailable due to:  Patient, per CHRIS ,is back in bed and trying to sleep. He refused to work with therapy at this time. Will check back later and progress as able.         Darryl Abdul, PTA Date: 2023
[facility] GLENN MARINO  Occupational Therapy    Date: 2023  Patient Name: Wendy Friday        : 1945       [] Pt Refusal           [x] Pt Unavailable due to: still eating lunch. PTA to attempt interventions once done. Will attempt OT interventions following PTA treatment session as able.         Travis Flowers OTR/NENITA Date: 2023
[facility]  Phone: [phone]    Occupational Therapy Skilled Daily Note  Date: 2023  Patient Name: Kristen Damon        MRN: [id number]    : 1945  (70 y.o.)    Subjective:     Subjective: Patient was sitting in recliner upon OT arrival. Was agreeable to OT interventions. Pt is REGRESSING from goals and independence of Self Care this treatment session    Discharge recommendation: Continue to assess Pending Progress    Objective:     ADLs:  N/A    Transfers:  N/A    Assessment:     Assessment: Patient was sitting in recliner upon OT arrival. Was agreeable to OT interventions. Declined need for ADLs. Attempted seated BUE ther ex via a 1 pound weight to faciitate increased activity tolerance, however patient began falling asleep approximately 4 minutes into exercise (following 15 repetitions of right shoulder flexion/extension and 10 repetitions on LUE). Required tactile cueing to initiate LUE exercise this date and when asked if he would like to attempt additional exercises, he replied, \"no. \" SPO2 level measured 92% (while on 5 LPM of oxygen via NC) pre-treatment session and between 97-98% post-treatment session. Denied any additional needs at this time. Patient remains in recliner with call light/personal items within reach and chair alarm activated upon OT departure. Will continue to address goals and progress patient as able. Notified RN re: limited session/patient's poor tolerance to interventions. Exercises:     See Flowsheets    Goals:     Short term goals:  Time Frame for Short Term Goals: 6 days (23)  Short Term Goal 1: Patient will complete a commode transfer while using DME PRN with CGA. Short Term Goal 2: Patient will complete upper body dressing and/or bathing while using adaptive equipment/techniques PRN with SETUP. Short Term Goal 3: Patient will complete lower body dressing and/or bathing while using adaptive equipment/techniques PRN with SBA.   Short Term Goal 4:
Patient aware that he remains in Swingbed status. SBU information provided to him verbally but no SBU packets made up for Patient to receive. SBU coordinator made aware and she will provide Patient with one on Monday.     [address] [place] [address]  12/1/2023
Phone: [address]  Date: 2023  Fax: [phone]      Physical Therapy    Daily Note    Patient Name: Omari Duffy      : 1945  (70 y.o.)  MRN: [id number]     Pt is PROGRESSING toward goals and increased independence of mobility this treatment session        Assessment           Supine to Sit: Supervision          Sit to Stand: Minimal Assistance  Stand to Sit: Contact guard assistance               WB Status: w.walker and CGA 30 ft x2 with 1 sitting rest break in between                Assessment: Pateint in bed after eating lunch. Sister is also in room. Supine to sit is supervision. Sitting balance at edge of bed is good. Sit to stand from bed is min/CGA. Ambulates with wheeled walker to hallway and back to bed ~30 ft total.  Requires verbal and tactile cues to stay inside walker especially when turning around. He is able to ambulate a second time to hallway with wheeled walker in same manner. Returns to sit at edge of bed and work with OT. OT now in room to work with patient.   Safety Devices  Type of Devices: Call light within reach, Gait belt (sitting at edge of bed to work with OT)          Call light in reach, Use of Gait belt, Other Staff Present  , Family Present, and Left in bed (seated at edge of bed to work with OT)  Time In: 1339  Time Out: 1402  Timed Coded Minutes: 23  Total Treatment Time: 23           Plan  Cont Per [address]  Term Goals  Time Frame for Short Term Goals: -  Short Term Goal 1: STG= LTG                Long Term Goals  Time Frame for Long Term Goals : 8 days - expires 23  Long Term Goal 1: Patient to ambulate with w.walker 25 ftx2 with supervision to return home with granddaughter  Long Term Goal 2: Patient to transfer sit to stand with SBA to w.walker  Long Term Goal 3: Patient to have good dynamic standing balance to complete ADLS safely  Long Term Goal 4: Patient to walk up and down 2 steps with handrail with min
Phone: [address]  Date: 2023  Fax: [phone]      Physical Therapy    Daily Note    Patient Name: Viri Wan      : 1945  (70 y.o.)  MRN: [id number]     Pt shows NO CHANGE toward goals and increased independence of mobility this treatment session        Assessment          Sit to Supine: Stand by assistance, Supervision       Sit to Stand: Minimal Assistance  Stand to Sit: Contact guard assistance  Bed to Chair: Minimal assistance (with wheeled walker)             WB Status: w.walker from chair to bed ~4-5 steps                Assessment: Patient seated up in chair upon arrival.  Began session with B LE exercises. Patient needs much cueing both verbal and tactile to follow directions. He also has trouble staying awake. Session is ended per PTA. Patient in chair with call light in reach and chair alarm attached. PTA leaves room and patient yells out. PTA returns and patient requests to lie back down. Sit to stand from yudy is min assist.  Able to ambulate a few steps with wheeled walker and min assist to bedside. Verbal cues required to stay inside walker espeically when turning. Sit to supine is supervision. Call light in reach and bed alarm turned on.   Safety Devices  Type of Devices: Call light within reach, Gait belt, Bed alarm in place, Left in bed, Nurse notified          Call light in reach, Phone in reach, Use of Gait belt, Bed alarm, Nurse Notified, and Left in bed  Time In: 0956  Time Out: 1014  Timed Coded Minutes: 19  Total Treatment Time: 19       Exercises:  See Flowsheets    Plan  Cont Per Plan Of Care    Goals  Short Term Goals  Time Frame for Short Term Goals: -  Short Term Goal 1: STG= LTG                Long Term Goals  Time Frame for Long Term Goals : 8 days - expires 23  Long Term Goal 1: Patient to ambulate with w.walker 25 ftx2 with supervision to return home with granddaughter  Long Term Goal 2: Patient to transfer sit to stand with SBA
Phone: [address]  Date: 2023  Fax: [phone]      Physical Therapy    Daily Note    Patient Name: Chela Pacheco      : 1945  (70 y.o.)  MRN: [id number]     Pt is PROGRESSING toward goals and increased independence of mobility this treatment session        Assessment           Supine to Sit: Supervision          Sit to Stand: Minimal Assistance (height of bed elevated slightly)  Stand to Sit: Contact guard assistance  Bed to Chair: Minimal assistance (with wheeled walker)             WB Status: w.walker and CGA ~10ft x 2                Assessment: Patient in bed upon arrival to room. He agrees to get up with PTA. Supine to sit to L side of bed is supervision. Patient reports some dizziness upon sitting up so he sits at edge of bed for extended period of time. When asked if he is ready to stand up he states not yet. Patient is also slow to answer questions. Sit to stand from bed with height of bed slightly elevated is min assist.  Ambulates into bathroom with wheeled walker and CGA. Verbal cues for proper placement of walker as he tends to push it too far ahead of him. Once in bathroom, he requires verbal and tactile cues to back up to toliet as he walked up to sink. Sits on commode for extended period of time. Sit to stand requires tactile cues for proper hand placement and min assist to stand. Standing balance is good to have lucian care completed. Ambulates back to chair following. Completes a minimal LE exercises before breakfast arrives. Remains in chair with call light in reach and chair alarm attached. Nurse in room to assess patient following.   Safety Devices  Type of Devices: Call light within reach, Chair alarm in place, Gait belt, Left in chair, Nurse notified          Call light in reach, Phone in reach, Use of Gait belt, Chair alarm, Nurse Notified, Other Staff Present  , and Left in chair  Time In: 729  Time Out: 802  Timed Coded Minutes: 33  Total
Phone: [address]  Date: 2023  Fax: [phone]      Physical Therapy    Daily Note    Patient Name: Wendy Friday      : 1945  (70 y.o.)  MRN: [id number]     Pt is PROGRESSING toward goals and increased independence of mobility this treatment session        Assessment           Supine to Sit: Stand by assistance (patient utilizes railing to assist with transfer)          Sit to Stand: Minimal Assistance  Stand to Sit: Contact guard assistance  Bed to Chair: Minimal assistance (with wheeled walker)             WB Status: w.walker from bed to chair ~3-4 ft x1 with CGA on 5L of O2                Assessment: [facility] in room with patient. Patient agrees to sit up in chair and try to eat more breakfast.  Supine to sit to R side of bed is SBA. He utilizes railing to assist with transfer. Sitting balance is good at edge of bed. Does note some dizziness upon sitting up. Sit to stand from bed requires 2 attemps. First he attempts on his own as PTA turns her back to fix chux pad in chair. Second attempt is wih min assist.  Ambulates with wheeled walker from bed to chair ~3-4 steps to chair. In chair to eat breakfast with call light in reach and chair alarm attached. Josi Underwood remains in room following.   Safety Devices  Type of Devices: Call light within reach, Chair alarm in place, Gait belt, Left in chair, Nurse notified          Call light in reach, Phone in reach, Use of Gait belt, Chair alarm, Nurse Notified, Family Present, and Left in chair  Time In: 0900  Time Out: 0910  Timed Coded Minutes: 10  Total Treatment Time: 10           Plan  Cont Per Plan Of Care    Goals  Short Term Goals  Time Frame for Short Term Goals: NA- see LTG                   Long Term Goals  Time Frame for Long Term Goals : 5 days  (23)  Long Term Goal 1: Patient to ambulate with w.walker 25 ftx2 with supervision to return home with granddaughter  Long Term Goal 2:
Phone: [address]  Date: 12/3/2023  Fax: [phone]      Physical Therapy    Daily Note    Patient Name: Amarilys Click      : 1945  (70 y.o.)  MRN: [id number]     Pt shows NO CHANGE toward goals and increased independence of mobility this treatment session        Assessment           Supine to Sit: Supervision          Sit to Stand: Minimal Assistance  Stand to Sit: Contact guard assistance               WB Status: w.walker and CGA ~10ft x 2                Assessment: Patient in bed with nurse in room checking vitals upon arrival.  Supine to sit to R side of bed is supervision. Patient takes extra time to process requests. Sitting balance at edge of be is good. Sit to stand from bed with height of bed slightly elevated is min x 1. Requires verbal cues for proper hand placement for sit to stand. Ambulates with wheeled walker into bathroom with CGA. Verbal cues required to keep walker close to him. Sits on commode for extended period of time before agreeing to stand up. Sit to stand from commode requires cueing for proper hand placement. Standing balance is good for lucian care to be completed. Ambulates to chair and refuses to complete exercises. He would like to eat his banana. Up in chair following with call light in reach and chair alarm attched. Nurse notified.   Safety Devices  Type of Devices: Call light within reach, Chair alarm in place, Gait belt, Left in chair, Nurse notified          Call light in reach, Phone in reach, Use of Gait belt, Chair alarm, Nurse Notified, and Left in chair  Time In: 0730  Time Out: 0755  Timed Coded Minutes: 25  Total Treatment Time: 25           Plan  Cont Per [address] Term Goals  Time Frame for Short Term Goals: -  Short Term Goal 1: STG= LTG                Long Term Goals  Time Frame for Long Term Goals : 8 days - expires 23  Long Term Goal 1: Patient to ambulate with w.walker 25 ftx2 with supervision to return
[facility] GLENN MARINO  Physical Therapy    Date: 2023  Patient Name: Radha Nguyen        : 1945       [] Pt Refusal           [x] Pt Unavailable due to:  Patient in bed still eating lunch. He then asks for ice cream which PTA gets for him after checking with nurse. Will check back when he is finished eating and progress as able.         Florestine Gilford, PTA Date: 2023
[facility]  Occupational Therapy    Date: 2023  Patient Name: Juli Nj        : 1945       [x] Pt Refusal: CHRIS attempted to see patient for OT session at 1022 this AM. Patient is asleep in bed upon arrival, awakens easily. Reports he is cold, provided patient w/ warm blanket. Refuses OT interventions at this time. Reports \"come back later\". Bed alarm in place. Call light and other personal items within reach.  Will check back this PM.            [] Pt Unavailable due to:        Time in: 1022  Time out: [address], [place]  Date: 2023
[facility]  Phone: [phone]    Occupational Therapy Skilled Daily Note  Date: 12/3/2023  Patient Name: Leonor Matta        MRN: [id number]    : 1945  (70 y.o.)    Subjective:     Subjective: Patient was sitting in recliner upon OT arrival. Was agreeable to OT interventions. Pt is PROGRESSING toward goals and independence of Self Care this treatment session    Discharge recommendation: Continue to assess Pending Progress    Objective:     ADLs:  Feeding: None  Grooming: Setup (using hand  per patient request in sitting)  UE Bathing: None  LE Bathing: None  UE Dressing: None  LE Dressing: Contact guard assistance, Verbal cueing, Increased time to complete (donning elastic-waist brief from to/from ankle level)  Toileting: Contact guard assistance, Increased time to complete, Maximum assistance (CGA: clothing management in standing with verbal cues for thoroughness; MAX A: lucian care in standing)    Transfers:  Sit to stand: Contact guard assistance   Stand to sit: Contact guard assistance (with verbal cues for proper walker/hand placement and body positioning)  Toilet Transfer: Contact guard assistance (with verbal cues for proper hand/walker placement and body positioning)    Assessment:     Assessment: Patient was sitting in recliner upon OT arrival. Was agreeable to OT interventions. Completed ADLs and functional transfers as documented above. Requested to use the bathroom shortly after arrival.  Performed functional mobility to/from bathroom via FWW/gait belt with CGA. Upon entering the bathroom, patient required verbal cues to back up toward commode as opposed to walking forward toward sink. Patient required increased time on the commode this date. Terminated session upon returning to recliner due to increased fatigue noted. Patient remains in recliner with call light/personal items/breakfast items within reach and chair alarm activated upon OT departure.  Will continue to
BMI (kg/m2): 17.7  Usual Body Weight: 63.5 kg (140 lb) (a year ago)  % Weight Change (Calculated): -9.3  Weight Adjustment For: No Adjustment                 BMI Categories: Underweight (BMI less than 18.5)    Estimated Daily Nutrient Needs:  Energy Requirements Based On: Kcal/kg  Weight Used for Energy Requirements: Current  Energy (kcal/day): 7610-3744 (30-35)  Weight Used for Protein Requirements: Current  Protein (g/day):  (1.5-1.8)  Method Used for Fluid Requirements: 1 ml/kcal  Fluid (ml/day): 2000    Nutrition Diagnosis:   Severe malnutrition, In context of acute illness or injury related to inadequate protein-energy intake as evidenced by BMI, poor intake prior to admission, moderate muscle loss, moderate loss of subcutaneous fat, weight loss  Altered nutrition-related lab values related to endocrine dysfuntion as evidenced by lab values    Lab Results   Component Value Date     12/01/2023    K 4.3 12/01/2023    CL 97 (L) 12/01/2023    CO2 35 (H) 12/01/2023    BUN 25 (H) 12/01/2023    CREATININE 0.5 (L) 12/01/2023    GLUCOSE 123 (H) 12/01/2023    CALCIUM 8.9 12/01/2023    PROT 5.8 (L) 12/08/2022    LABALBU 2.7 (L) 12/08/2022    BILITOT <0.2 12/08/2022    ALKPHOS 48 12/08/2022    AST 9 12/08/2022    ALT 6 12/08/2022    LABGLOM >60 12/01/2023    GFRAA >60 01/14/2022    GLOB 3.0 12/07/2022     Hemoglobin A1C   Date Value Ref Range Status   11/28/2023 5.1 4.0 - 6.0 % Final     No results found for: \"VITD25\"    Nutrition Interventions:   Food and/or Nutrient Delivery: Continue Current Diet, Continue Oral Nutrition Supplement  Nutrition Education/Counseling: Education initiated  Coordination of Nutrition Care: Continue to monitor while inpatient  Plan of Care discussed with: patient and family members    Goals:     Goals: Meet at least 75% of estimated needs       Nutrition Monitoring and Evaluation:   Behavioral-Environmental Outcomes:  Other (Comment) (patient's disposition)  Food/Nutrient Intake

## 2023-12-04 NOTE — DISCHARGE SUMMARY
Hospitalist Discharge Summary    Diallo Gonzalez  :  1945  MRN:  [id number]    Admit date:  2023  Discharge date:  23    Admitting Physician:  Kristel Davis MD    Discharge Diagnoses:   Generalized weakness. COPD exacerbation. Pneumonia. 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 Acute on chronic anemia with  heme positive stool. Hyponatremia - resolved. Hypothyroidism. DM II      Severe protein malnutrition. Admission Condition:  poor      Discharged Condition:  good    Hospital Course: The patient is a 70 y.o. male with history of advanced COPD with chronic hypoxemic hypercapnic respiratory failure which he is on 5L NC oxygen at home,  , history of diabetes, sepsis due to RSV pneumonia, moderate malnutrition, pulmonary nodules, tobacco abuse was brought to the emergency room by family members for evaluation of elevated heart rate, decreased appetite and some confusion. Patient is somewhat confused this morning and not able to provide me with history. His granddaughter Sarah Chavez came to visit him and told me that the patient used to live by himself independently up until about 2 weeks ago when he started declining mentally and physically. Granddaughter had him move in with her. She noticed that the patient has been sleeping a lot, not eating, getting more and more confused. Patient did not complain of any pains, shortness of breath, he did not have increased from baseline cough. Patient is on continuous 5 L of oxygen for the past 2 years due to advanced COPD. Apparently was still smoking more than a pack a day. Yesterday he had an appointment with his primary care physician in [address] [place] [address] and apparently was tachycardic in office and for this reason was advised to be taken to the emergency room for evaluation. Work-up in the emergency room revealed temperature 98, respirations 26, pulse 95, blood pressure 107/69. Patient was 99% on 5 L nasal cannula.   BMP revealed sodium

## 2023-12-08 ENCOUNTER — HOSPITAL ENCOUNTER (EMERGENCY)
Age: 78
Discharge: HOSPICE | End: 2023-12-09
Attending: FAMILY MEDICINE
Payer: MEDICARE

## 2023-12-08 ENCOUNTER — APPOINTMENT (OUTPATIENT)
Dept: GENERAL RADIOLOGY | Age: 78
End: 2023-12-08
Payer: MEDICARE

## 2023-12-08 VITALS
DIASTOLIC BLOOD PRESSURE: 78 MMHG | HEART RATE: 112 BPM | SYSTOLIC BLOOD PRESSURE: 139 MMHG | RESPIRATION RATE: 25 BRPM | BODY MASS INDEX: 17.56 KG/M2 | WEIGHT: 125.9 LBS | TEMPERATURE: 97 F | OXYGEN SATURATION: 89 %

## 2023-12-08 DIAGNOSIS — J96.21 ACUTE ON CHRONIC RESPIRATORY FAILURE WITH HYPOXIA AND HYPERCAPNIA (HCC): Primary | ICD-10-CM

## 2023-12-08 DIAGNOSIS — J96.22 ACUTE ON CHRONIC RESPIRATORY FAILURE WITH HYPOXIA AND HYPERCAPNIA (HCC): Primary | ICD-10-CM

## 2023-12-08 DIAGNOSIS — Z87.09 HISTORY OF COPD: ICD-10-CM

## 2023-12-08 DIAGNOSIS — R64 CACHECTIC (HCC): ICD-10-CM

## 2023-12-08 DIAGNOSIS — Z51.5 ENCOUNTER FOR ADMISSION TO HOSPICE CARE: ICD-10-CM

## 2023-12-08 LAB
ANION GAP SERPL CALCULATED.3IONS-SCNC: 11 MMOL/L (ref 9–17)
BASOPHILS # BLD: 0 K/UL (ref 0–0.2)
BASOPHILS NFR BLD: 0 % (ref 0–2)
BUN SERPL-MCNC: 34 MG/DL (ref 8–23)
BUN/CREAT SERPL: 38 (ref 9–20)
CALCIUM SERPL-MCNC: 10.2 MG/DL (ref 8.6–10.4)
CHLORIDE SERPL-SCNC: 92 MMOL/L (ref 98–107)
CO2 SERPL-SCNC: 30 MMOL/L (ref 20–31)
CREAT SERPL-MCNC: 0.9 MG/DL (ref 0.7–1.2)
EOSINOPHIL # BLD: 0.32 K/UL (ref 0–0.4)
EOSINOPHILS RELATIVE PERCENT: 1 % (ref 0–5)
ERYTHROCYTE [DISTWIDTH] IN BLOOD BY AUTOMATED COUNT: 14.4 % (ref 12.1–15.2)
FIO2: 100
GFR SERPL CREATININE-BSD FRML MDRD: >60 ML/MIN/1.73M2
GLUCOSE SERPL-MCNC: 261 MG/DL (ref 70–99)
HCT VFR BLD AUTO: 32.8 % (ref 41–53)
HGB BLD-MCNC: 9.8 G/DL (ref 13.5–17.5)
IMM GRANULOCYTES # BLD AUTO: 0 K/UL (ref 0–0.3)
IMM GRANULOCYTES NFR BLD: 0 % (ref 0–5)
LACTATE BLDV-SCNC: 2.5 MMOL/L (ref 0.5–2.2)
LYMPHOCYTES NFR BLD: 3.51 K/UL (ref 1–4.8)
LYMPHOCYTES RELATIVE PERCENT: 11 % (ref 13–44)
MCH RBC QN AUTO: 25 PG (ref 26–34)
MCHC RBC AUTO-ENTMCNC: 29.9 G/DL (ref 31–37)
MCV RBC AUTO: 83.7 FL (ref 80–100)
MONOCYTES NFR BLD: 2.55 K/UL (ref 0–1)
MONOCYTES NFR BLD: 8 % (ref 5–9)
MORPHOLOGY: ABNORMAL
NEGATIVE BASE EXCESS, ART: 1.2 MMOL/L (ref 0–2)
NEUTROPHILS NFR BLD: 80 % (ref 39–75)
NEUTS SEG NFR BLD: 25.52 K/UL (ref 2.1–6.5)
O2 DELIVERY DEVICE: ABNORMAL
PLATELET # BLD AUTO: 560 K/UL (ref 140–450)
PMV BLD AUTO: 9.6 FL (ref 6–12)
POC HCO3: 33.1 MMOL/L (ref 21–28)
POC O2 SATURATION: 85.8 % (ref 94–98)
POC PCO2: 136.1 MM HG (ref 35–48)
POC PH: 6.99 (ref 7.35–7.45)
POC PO2: 81.9 MM HG (ref 83–108)
POTASSIUM SERPL-SCNC: 4.7 MMOL/L (ref 3.7–5.3)
RBC # BLD AUTO: 3.92 M/UL (ref 4.5–5.9)
SODIUM SERPL-SCNC: 133 MMOL/L (ref 135–144)
WBC OTHER # BLD: 31.9 K/UL (ref 3.5–11)

## 2023-12-08 PROCEDURE — 99285 EMERGENCY DEPT VISIT HI MDM: CPT

## 2023-12-08 PROCEDURE — 96375 TX/PRO/DX INJ NEW DRUG ADDON: CPT

## 2023-12-08 PROCEDURE — 80048 BASIC METABOLIC PNL TOTAL CA: CPT

## 2023-12-08 PROCEDURE — 85025 COMPLETE CBC W/AUTO DIFF WBC: CPT

## 2023-12-08 PROCEDURE — 36415 COLL VENOUS BLD VENIPUNCTURE: CPT

## 2023-12-08 PROCEDURE — 36600 WITHDRAWAL OF ARTERIAL BLOOD: CPT

## 2023-12-08 PROCEDURE — 96376 TX/PRO/DX INJ SAME DRUG ADON: CPT

## 2023-12-08 PROCEDURE — 6360000002 HC RX W HCPCS: Performed by: FAMILY MEDICINE

## 2023-12-08 PROCEDURE — 71045 X-RAY EXAM CHEST 1 VIEW: CPT

## 2023-12-08 PROCEDURE — 82803 BLOOD GASES ANY COMBINATION: CPT

## 2023-12-08 PROCEDURE — 87040 BLOOD CULTURE FOR BACTERIA: CPT

## 2023-12-08 PROCEDURE — 94660 CPAP INITIATION&MGMT: CPT

## 2023-12-08 PROCEDURE — 83605 ASSAY OF LACTIC ACID: CPT

## 2023-12-08 PROCEDURE — 96374 THER/PROPH/DIAG INJ IV PUSH: CPT

## 2023-12-08 PROCEDURE — 6370000000 HC RX 637 (ALT 250 FOR IP): Performed by: FAMILY MEDICINE

## 2023-12-08 PROCEDURE — 94664 DEMO&/EVAL PT USE INHALER: CPT

## 2023-12-08 RX ORDER — LANOLIN ALCOHOL/MO/W.PET/CERES
325 CREAM (GRAM) TOPICAL 2 TIMES DAILY
COMMUNITY
Start: 2023-12-06 | End: 2024-12-05

## 2023-12-08 RX ORDER — LORAZEPAM 2 MG/ML
0.5 INJECTION INTRAMUSCULAR ONCE
Status: COMPLETED | OUTPATIENT
Start: 2023-12-08 | End: 2023-12-09

## 2023-12-08 RX ORDER — MORPHINE SULFATE 4 MG/ML
1 INJECTION, SOLUTION INTRAMUSCULAR; INTRAVENOUS ONCE
Status: COMPLETED | OUTPATIENT
Start: 2023-12-08 | End: 2023-12-09

## 2023-12-08 RX ORDER — IPRATROPIUM BROMIDE AND ALBUTEROL SULFATE 2.5; .5 MG/3ML; MG/3ML
1 SOLUTION RESPIRATORY (INHALATION) ONCE
Status: COMPLETED | OUTPATIENT
Start: 2023-12-08 | End: 2023-12-08

## 2023-12-08 RX ORDER — LORAZEPAM 2 MG/ML
0.5 INJECTION INTRAMUSCULAR ONCE
Status: COMPLETED | OUTPATIENT
Start: 2023-12-08 | End: 2023-12-08

## 2023-12-08 RX ADMIN — METHYLPREDNISOLONE SODIUM SUCCINATE 125 MG: 125 INJECTION, POWDER, FOR SOLUTION INTRAMUSCULAR; INTRAVENOUS at 18:20

## 2023-12-08 RX ADMIN — LORAZEPAM 0.5 MG: 2 INJECTION, SOLUTION INTRAMUSCULAR; INTRAVENOUS at 19:38

## 2023-12-08 RX ADMIN — IPRATROPIUM BROMIDE AND ALBUTEROL SULFATE 1 DOSE: .5; 3 SOLUTION RESPIRATORY (INHALATION) at 18:37

## 2023-12-08 NOTE — ED PROVIDER NOTES
[address]      Pt Name: Chela Pacheco  MRN: [id number]  [facility] 1945  Date of evaluation: 12/8/2023  Provider: Yandel Garzon MD    CHIEF COMPLAINT       Chief Complaint   Patient presents with    Shortness of Breath     Patient arrived via [facility] from home, he is SOB when EMS got there his end tidal was 12 when they ambulated him, his O2 sat was below [address] [facility]      Chela Pacheco is a 70 y.o. male who presents to the emergency department via EMS from home, patient found by family to be severely short of breath, known history of COPD including recent hospitalization and discharged a few days prior. EMS reports on arrival patient was ventilated nonresponsive, pulse ox less than 50%, with capnography of 45 though quickly decreased to 11 once they moved patient, with nonrebreather would get patient to 65% pulse oximetry. They did have concerns for possible Rales at the bases and some gurgling in patient just prior to moving although after moving him it went away.     PCP: BENI Villatoro        REVIEW OF SYSTEMS       Review of Systems   Unable to perform ROS: Acuity of condition         PAST MEDICAL HISTORY     Past Medical History:   Diagnosis Date    Brain tumor (benign) ([address])     COPD (chronic obstructive pulmonary disease) (HCC)     Diabetes mellitus ([address])          SURGICAL HISTORY       Past Surgical History:   Procedure Laterality Date    BRAIN SURGERY           CURRENT MEDICATIONS       Previous Medications    CELECOXIB (CELEBREX) 200 MG CAPSULE    Take 1 capsule by mouth daily    FERROUS SULFATE (FE TABS 325) 325 (65 FE) MG EC TABLET    Take 1 tablet by mouth 2 times daily    FLUOXETINE (PROZAC) 10 MG CAPSULE    Take 1 capsule by mouth daily    LEVOTHYROXINE (SYNTHROID) 112 MCG TABLET    Take 1 tablet by mouth Daily    LOSARTAN (COZAAR) 25 MG TABLET    Take 1 tablet by mouth daily    METFORMIN (GLUCOPHAGE) 500 MG TABLET    Take 2 81.9 / 33.1    Chest x-ray reviewed on x-ray cart    Patient's daughter and granddaughter were present, brought to private room, discussed my concern for patient's COPD and his significant increased work of breathing, no in the marked the abnormal blood gas, given patient's overall health, concern for overall outcome, there was some discussion regarding DNR status, patient has 2 additional family members/siblings and route, and daughter would like to wait for them to arrive to discuss DNR status and possible hospice care. I advised in the meantime we will continue to do everything we can. Critical lab result, white blood cell count 31.9    Blood cultures x 2 and lactic acid ordered, reviewed allergies, Zosyn and vancomycin ordered    Additional family members had shown up, but not the 2 we are waiting for, again brought up to speed over my concern that we will need to make a decision as patient needs to be intubated and/or placed on DNR CC/hospice care, interim will continue to observe patient closely. Given patient's history of end-stage COPD, we are currently weaning the BiPAP FiO2 down, currently at 70% with patient maintaining 95% pulse ox. Patient continues to be markedly obtunded. Lactic 2.5, differential shows 80% PMNs, H&H 9.8/30.8, , SCR 0.9, BUN 34, , K4.7      Additional family arrived, we had further discussion regarding patient's current condition, given patient's presentation would have to make a decision of intubation versus hospice care, after further discussion with family including oldest son, daughter, and additional 3-4 family members present, decision was made for hospice care. Zosyn vancomycin orders canceled, DNR CC paperwork completed, inpatient consult to hospice care ordered.     Observing patient's work of breathing, will give lorazepam 0.5 mg IV x 1 the patient is not feeling any anxiety, patient family updated on this, acknowledged    Nursing working on getting

## 2023-12-09 ENCOUNTER — HOSPITAL ENCOUNTER (INPATIENT)
Age: 78
LOS: 1 days | End: 2023-12-09
Attending: INTERNAL MEDICINE | Admitting: INTERNAL MEDICINE
Payer: COMMERCIAL

## 2023-12-09 VITALS
SYSTOLIC BLOOD PRESSURE: 104 MMHG | RESPIRATION RATE: 26 BRPM | OXYGEN SATURATION: 85 % | DIASTOLIC BLOOD PRESSURE: 63 MMHG | HEART RATE: 103 BPM | TEMPERATURE: 93.6 F

## 2023-12-09 PROCEDURE — 2700000000 HC OXYGEN THERAPY PER DAY

## 2023-12-09 PROCEDURE — 1250000000 HC SEMI PRIVATE HOSPICE R&B

## 2023-12-09 PROCEDURE — 94761 N-INVAS EAR/PLS OXIMETRY MLT: CPT

## 2023-12-09 PROCEDURE — 94660 CPAP INITIATION&MGMT: CPT

## 2023-12-09 PROCEDURE — 6360000002 HC RX W HCPCS: Performed by: FAMILY MEDICINE

## 2023-12-09 RX ORDER — SODIUM CHLORIDE 0.9 % (FLUSH) 0.9 %
5-40 SYRINGE (ML) INJECTION EVERY 12 HOURS SCHEDULED
Status: DISCONTINUED | OUTPATIENT
Start: 2023-12-09 | End: 2023-12-09 | Stop reason: HOSPADM

## 2023-12-09 RX ORDER — MORPHINE SULFATE 2 MG/ML
2 INJECTION, SOLUTION INTRAMUSCULAR; INTRAVENOUS
Status: DISCONTINUED | OUTPATIENT
Start: 2023-12-09 | End: 2023-12-09 | Stop reason: HOSPADM

## 2023-12-09 RX ORDER — SODIUM CHLORIDE 9 MG/ML
INJECTION, SOLUTION INTRAVENOUS PRN
Status: DISCONTINUED | OUTPATIENT
Start: 2023-12-09 | End: 2023-12-09 | Stop reason: HOSPADM

## 2023-12-09 RX ORDER — SODIUM CHLORIDE 0.9 % (FLUSH) 0.9 %
5-40 SYRINGE (ML) INJECTION PRN
Status: DISCONTINUED | OUTPATIENT
Start: 2023-12-09 | End: 2023-12-09 | Stop reason: HOSPADM

## 2023-12-09 RX ORDER — ATROPINE SULFATE 10 MG/ML
2 SOLUTION/ DROPS OPHTHALMIC EVERY 4 HOURS PRN
Status: DISCONTINUED | OUTPATIENT
Start: 2023-12-09 | End: 2023-12-09 | Stop reason: HOSPADM

## 2023-12-09 RX ORDER — LEVOTHYROXINE SODIUM 112 UG/1
112 TABLET ORAL DAILY
Status: DISCONTINUED | OUTPATIENT
Start: 2023-12-09 | End: 2023-12-09 | Stop reason: HOSPADM

## 2023-12-09 RX ORDER — ACETAMINOPHEN 325 MG/1
650 TABLET ORAL EVERY 4 HOURS PRN
Status: DISCONTINUED | OUTPATIENT
Start: 2023-12-09 | End: 2023-12-09 | Stop reason: HOSPADM

## 2023-12-09 RX ORDER — LORAZEPAM 2 MG/ML
2 INJECTION INTRAMUSCULAR EVERY 4 HOURS PRN
Status: DISCONTINUED | OUTPATIENT
Start: 2023-12-09 | End: 2023-12-09 | Stop reason: HOSPADM

## 2023-12-09 RX ADMIN — LORAZEPAM 0.5 MG: 2 INJECTION, SOLUTION INTRAMUSCULAR; INTRAVENOUS at 00:02

## 2023-12-09 RX ADMIN — MORPHINE SULFATE 1 MG: 4 INJECTION, SOLUTION INTRAMUSCULAR; INTRAVENOUS at 00:00

## 2023-12-09 ASSESSMENT — PAIN SCALES - GENERAL: PAINLEVEL_OUTOF10: 0

## 2023-12-09 NOTE — DISCHARGE SUMMARY
Dannie Damon  :  1945  MRN:  [id number]    Admit date:  2023  Discharge date:  2023    Admitting Physician:  Abena Damico MD    Cause of Death:  Acute hypercapnic / hypoxic respiratory failure      Hospital Course:     Davidson Maria was brought to the ER via EMS with severe shortness of breath. ABG showed a PH of 6.994, PCO2 136, PaO2 81, and HCO3 of 33. WBC was 31.9 with a Lactic acid of 2.5. He was placed on BIPAP with the recommendation of intubation. The family requested comfort care and requested Hospice be called in to the ER. [facility],  [address] evaluated Davidson Maria in the ER and accepted him to inpatient Hospice. He was admitted to [address] [place] [address] under [facility]. IV Morphine was ordered for pain control and IV Ativan for anxiety. Atropine drops were ordered for secretions. He was taken off BIPAP and placed on a Non rebreather. At 2:56 am, Davidson Maria passed away with family at bedside.             Signed:  Abena Damico MD  2023, 5:47 AM

## 2023-12-09 NOTE — PROGRESS NOTES
[facility] called with an update. Hospice nurse will be arriving in about 15 minutes. Hospice nurse's name is Johnnie Campbell. Primary nurse Rubi lugo and Dr. Luciana Harrington.

## 2023-12-09 NOTE — PROGRESS NOTES
Hospice nurse assessing Pt HR. This nurse also assesses HR and radial pulse. Hospice nurse pronounces pt death. [id number]-Kay Cost into pronounce pt.     0330. SL removed and post mortem care initiated with Hospice nurse.

## 2023-12-09 NOTE — ED NOTES
Phoned [facility] and made referral for pt. Family also updated on call.       Tyrone Nichole RN  12/08/23 Nisa Caballero

## 2023-12-09 NOTE — PROGRESS NOTES
Patient's Name: Chela Pacheco   Patient's YOB: 1945  MRN Number: [id number]    Patient was examined and the following were absent:  Apical and Radial Pulses  Blood Pressure  Respiratory Effort    This information was relayed to ED physician on duty at time of death.

## 2023-12-09 NOTE — PROGRESS NOTES
Patient taken off Bipap and placed on 100% NRB Mask. Patient unresponsive and family present. Patient admitted to [address] [place] [address] and is a [facility] code status.

## 2023-12-09 NOTE — ED NOTES
Mihaela Whalen from [address], [address] getting report for pt from Physician.       Sapna Kramer RN  12/08/23 [facility], [address]  12/08/23 [id number]

## 2023-12-10 LAB
MICROORGANISM SPEC CULT: NORMAL
MICROORGANISM SPEC CULT: NORMAL
SERVICE CMNT-IMP: NORMAL
SERVICE CMNT-IMP: NORMAL
SPECIMEN DESCRIPTION: NORMAL
SPECIMEN DESCRIPTION: NORMAL

## 2023-12-11 NOTE — PROGRESS NOTES
SW accessed chart to answer [facility] question regarding pt passing with hospice services.  Ania Agustin MSW LSW 12/11/2023
